# Patient Record
Sex: FEMALE | Race: WHITE | Employment: OTHER | URBAN - METROPOLITAN AREA
[De-identification: names, ages, dates, MRNs, and addresses within clinical notes are randomized per-mention and may not be internally consistent; named-entity substitution may affect disease eponyms.]

---

## 2017-02-07 RX ORDER — LOSARTAN POTASSIUM AND HYDROCHLOROTHIAZIDE 25; 100 MG/1; MG/1
TABLET ORAL
Qty: 90 TAB | Refills: 0 | Status: SHIPPED | OUTPATIENT
Start: 2017-02-07 | End: 2017-05-08 | Stop reason: SDUPTHER

## 2017-02-10 RX ORDER — METOPROLOL SUCCINATE 25 MG/1
TABLET, EXTENDED RELEASE ORAL
Qty: 90 TAB | Refills: 0 | Status: SHIPPED | OUTPATIENT
Start: 2017-02-10 | End: 2017-05-08 | Stop reason: SDUPTHER

## 2017-03-27 ENCOUNTER — HOSPITAL ENCOUNTER (OUTPATIENT)
Dept: LAB | Age: 77
Discharge: HOME | End: 2017-03-27
Payer: MEDICARE

## 2017-03-27 ENCOUNTER — OFFICE VISIT (OUTPATIENT)
Dept: FAMILY MEDICINE CLINIC | Age: 77
End: 2017-03-27

## 2017-03-27 VITALS
DIASTOLIC BLOOD PRESSURE: 88 MMHG | HEIGHT: 62 IN | WEIGHT: 141 LBS | BODY MASS INDEX: 25.95 KG/M2 | OXYGEN SATURATION: 97 % | SYSTOLIC BLOOD PRESSURE: 138 MMHG | RESPIRATION RATE: 18 BRPM | HEART RATE: 82 BPM | TEMPERATURE: 97.8 F

## 2017-03-27 DIAGNOSIS — E03.9 ACQUIRED HYPOTHYROIDISM: ICD-10-CM

## 2017-03-27 DIAGNOSIS — M51.36 DDD (DEGENERATIVE DISC DISEASE), LUMBAR: ICD-10-CM

## 2017-03-27 DIAGNOSIS — I10 ESSENTIAL HYPERTENSION: ICD-10-CM

## 2017-03-27 DIAGNOSIS — Z00.00 ROUTINE GENERAL MEDICAL EXAMINATION AT A HEALTH CARE FACILITY: Primary | ICD-10-CM

## 2017-03-27 DIAGNOSIS — Z71.89 ADVANCED CARE PLANNING/COUNSELING DISCUSSION: ICD-10-CM

## 2017-03-27 PROCEDURE — 80061 LIPID PANEL: CPT

## 2017-03-27 PROCEDURE — 85025 COMPLETE CBC W/AUTO DIFF WBC: CPT

## 2017-03-27 PROCEDURE — 80053 COMPREHEN METABOLIC PANEL: CPT

## 2017-03-27 PROCEDURE — 84443 ASSAY THYROID STIM HORMONE: CPT

## 2017-03-27 RX ORDER — LEVOTHYROXINE SODIUM 125 UG/1
125 TABLET ORAL
Qty: 90 TAB | Refills: 1 | Status: SHIPPED | OUTPATIENT
Start: 2017-03-27 | End: 2017-04-03 | Stop reason: SDUPTHER

## 2017-03-27 RX ORDER — TRAMADOL HYDROCHLORIDE 50 MG/1
TABLET ORAL
Qty: 60 TAB | Refills: 5 | Status: SHIPPED | OUTPATIENT
Start: 2017-03-27 | End: 2018-07-03 | Stop reason: ALTCHOICE

## 2017-03-27 NOTE — PROGRESS NOTES
Patient here for 6 month f/u, fasting labs, med refill. 1. Have you been to the ER, urgent care clinic since your last visit? Hospitalized since your last visit? No    2. Have you seen or consulted any other health care providers outside of the [address] [place] [address] since your last visit? Include any pap smears or colon screening. No       Due for med cpx    Chief Complaint   Patient presents with    Labs     fasting      she is a 77y.o. year old female who presents for evaluation for their Medicare Wellness Visit. Loretha Ramal is completed and assessed=yes  Depression Screen is completed and assessed=yes  Medication list reviewed and adjusted for accuracy=yes  Immunizations reviewed and updated=yes  Health/Preventative Screenings reviewed and updated=yes  ADL Functions reviewed=yes    Patient Active Problem List    Diagnosis    Advanced care planning/counseling discussion     Has a LW,asked to bring in     [address] care planning     Pt has LW      Asthma    Bronchitis    Bronchospasm with bronchitis, acute    DDD (degenerative disc disease), lumbar    Tachycardia    HTN (hypertension)    Hypothyroid    OA (osteoarthritis)    GERD (gastroesophageal reflux disease)    Osteoporosis    Left knee DJD       Reviewed PmHx, RxHx, FmHx, SocHx, AllgHx and updated and dated in the chart.     Review of Systems - negative except as listed above in the HPI    Objective:     Vitals:    03/27/17 0849   BP: 138/88   Pulse: 82   Resp: 18   Temp: 97.8 °F (36.6 °C)   SpO2: 97%   Weight: 141 lb (64 kg)   Height: 5' 2\" (1.575 m)     Physical Examination: General appearance - alert, well appearing, and in no distress  Eyes - pupils equal and reactive, extraocular eye movements intact  Ears - bilateral TM's and external ear canals normal  Nose - normal and patent, no erythema, discharge or polyps  Mouth - mucous membranes moist, pharynx normal without lesions  Neck - supple, no significant adenopathy  Chest - clear to auscultation, no wheezes, rales or rhonchi, symmetric air entry  Heart - normal rate, regular rhythm, normal S1, S2, no murmurs, rubs, clicks or gallops  Abdomen - soft, nontender, nondistended, no masses or organomegaly  Extremities - peripheral pulses normal, no pedal edema, no clubbing or cyanosis    Assessment/ Plan:   Rose Montoya was seen today for labs. Diagnoses and all orders for this visit:    Routine general medical examination at a health care facility  -see below    Essential hypertension  -     LIPID PANEL  -     METABOLIC PANEL, COMPREHENSIVE  -     CBC WITH AUTOMATED DIFF  -     TSH 3RD GENERATION  -at goal    Acquired hypothyroidism  -     levothyroxine (SYNTHROID) 125 mcg tablet; Take 1 Tab by mouth Daily (before breakfast).  TAKE ONE TABLET (137MCG) BY MOUTH ONCE DAILY BEFORE BREAKFAST  -     TSH 3RD GENERATION  -billy dec dose last OV    Advanced care planning/counseling discussion  -has LW--asked pt to bring it in    DDD (degenerative disc disease), lumbar  -     traMADol (ULTRAM) 50 mg tablet; TAKE ONE TABLET BY MOUTH EVERY 8 HOURS AS NEEDED FOR PAIN  -using off and on         -Pain evaluation performed in office  -Cognitive Screen performed in office  -Depression Screen, Fall risks (by up and go test)  and ADL functionality were addressed  -Medication list updated and reviewed for any changes   -A comprehensive review of medical issues and a plan was formulated  -End of life planning was addressed with pt   -Health Screenings for preventions were addressed and a plan was formulated  -Shingles Vaccine was recommended  -Discussed with patient cancer risk factors and appropriate screenings for age  -Patient evaluated for colonoscopy and referred if needed per screeing criteria  -Labs from previous visits were discussed with patient   -Discussed with patient diet and exercise and formulated a plan as needed  -An Advanced care plan was developed with the patient.  -Alcohol screening performed and was negative    -Follow-up Disposition:  Return in about 6 months (around 9/27/2017). I have discussed the diagnosis with the patient and the intended plan as seen in the above orders. The patient understands and agrees with the plan. The patient has received an after-visit summary and questions were answered concerning future plans. Medication Side Effects and Warnings were discussed with patien  Patient Labs were reviewed and or requested  Patient Past Records were reviewed and or requested    There are no Patient Instructions on file for this visit.       Maria D Coronado M.D.

## 2017-03-27 NOTE — MR AVS SNAPSHOT
Visit Information Date & Time Provider Department Dept. Phone Encounter #  
 3/27/2017  8:30 AM Lucas Schmitz MD [address] [facility] [phone] [id number] Follow-up Instructions Return in about 6 months (around 9/27/2017). Upcoming Health Maintenance Date Due DTaP/Tdap/Td series (1 - Tdap) 10/31/1962 MEDICARE YEARLY EXAM 3/16/2017 GLAUCOMA SCREENING Q2Y 3/27/2019 COLONOSCOPY 3/15/2026 Allergies as of 3/27/2017  Review Complete On: 3/27/2017 By: Lucas Schmitz MD  
  
 Severity Noted Reaction Type Reactions Ace Inhibitors  05/10/2010    Hives, Swelling Ambien [Zolpidem]  05/10/2010    Other (comments) HALLUCINATIONS Keflex [Cephalexin]  05/10/2010    Other (comments) Pt reports having hives from head to toe so her PCP included several drugs that she was taking at the time as the cause (Keflex, Levaquin and ACE inhibitors) Levaquin [Levofloxacin]  05/10/2010    Not Reported This Time Current Immunizations  Reviewed on 5/9/2016 Name Date Influenza Vaccine 9/20/2014 Influenza Vaccine Whole 10/10/2011 Pneumococcal Conjugate (PCV-13) 8/24/2015 Pneumococcal Vaccine (Unspecified Type) 8/21/2010 Zoster Vaccine, Live 12/21/2013 Not reviewed this visit You Were Diagnosed With   
  
 Codes Comments Routine general medical examination at a health care facility    -  Primary ICD-10-CM: Z00.00 ICD-9-CM: V70.0 Essential hypertension     ICD-10-CM: I10 
ICD-9-CM: 401.9 Acquired hypothyroidism     ICD-10-CM: E03.9 ICD-9-CM: 244.9 Advanced care planning/counseling discussion     ICD-10-CM: Z71.89 ICD-9-CM: V65.49   
 DDD (degenerative disc disease), lumbar     ICD-10-CM: M51.36 
ICD-9-CM: 722.52 Vitals BP Pulse Temp Resp Height(growth percentile) Weight(growth percentile) 138/88 82 97.8 °F (36.6 °C) 18 5' 2\" (1.575 m) 141 lb (64 kg) SpO2 BMI OB Status Smoking Status 97% 25.79 kg/m2 Hysterectomy Never Smoker Vitals History BMI and BSA Data Body Mass Index Body Surface Area 25.79 kg/m 2 1.67 m 2 Preferred Pharmacy Pharmacy Name [facility] [address] Your Updated Medication List  
  
   
This list is accurate as of: 3/27/17  9:17 AM.  Always use your most recent med list.  
  
  
  
  
 Cholecalciferol (Vitamin D3) 2,000 unit Cap capsule Commonly known as:  VITAMIN D3 Take 4,000 Units by mouth.  
  
 levothyroxine 125 mcg tablet Commonly known as:  SYNTHROID Take 1 Tab by mouth Daily (before breakfast). TAKE ONE TABLET (137MCG) BY MOUTH ONCE DAILY BEFORE BREAKFAST  
  
 losartan-hydroCHLOROthiazide 100-25 mg per tablet Commonly known as:  HYZAAR  
TAKE ONE TABLET BY MOUTH ONCE DAILY  
  
 metoprolol succinate 25 mg XL tablet Commonly known as:  TOPROL-XL  
TAKE ONE TABLET BY MOUTH ONCE DAILY MULTIVITAMIN PO Take 1 Tab by mouth daily. OMEGA 3 FISH OIL PO Take 1 Cap by mouth daily. traMADol 50 mg tablet Commonly known as:  ULTRAM  
TAKE ONE TABLET BY MOUTH EVERY 8 HOURS AS NEEDED FOR PAIN Prescriptions Printed Refills  
 traMADol (ULTRAM) 50 mg tablet 5 Sig: TAKE ONE TABLET BY MOUTH EVERY 8 HOURS AS NEEDED FOR PAIN Class: Print Prescriptions Sent to Pharmacy Refills  
 levothyroxine (SYNTHROID) 125 mcg tablet 1 Sig: Take 1 Tab by mouth Daily (before breakfast). TAKE ONE TABLET (137MCG) BY MOUTH ONCE DAILY BEFORE BREAKFAST Class: Normal  
 Pharmacy: [address] [place] [address] Ph #: [phone] Route: Oral  
  
We Performed the Following CBC WITH AUTOMATED DIFF [57048 CPT(R)] LIPID PANEL [19905 CPT(R)] METABOLIC PANEL, COMPREHENSIVE [37879 CPT(R)] TSH 3RD GENERATION [10925 CPT(R)] Follow-up Instructions Return in about 6 months (around 9/27/2017). Introducing [facility]! Dear Suni Turner: Thank you for requesting a Cohuman account. Our records indicate that you already have an active Cohuman account. You can access your account anytime at [email or web address] Did you know that you can access your hospital and ER discharge instructions at any time in Cohuman? You can also review all of your test results from your hospital stay or ER visit. Additional Information If you have questions, please visit the Frequently Asked Questions section of the Cohuman website at [email or web address] Remember, Cohuman is NOT to be used for urgent needs. For medical emergencies, dial 911. Now available from your iPhone and Android! Please provide this summary of care documentation to your next provider. Your primary care clinician is listed as IGTA ISLAS. If you have any questions after today's visit, please call [phone].

## 2017-03-28 LAB
ALBUMIN SERPL-MCNC: 4.1 G/DL (ref 3.5–4.8)
ALBUMIN/GLOB SERPL: 1.6 {RATIO} (ref 1.2–2.2)
ALP SERPL-CCNC: 84 IU/L (ref 39–117)
ALT SERPL-CCNC: 14 IU/L (ref 0–32)
AST SERPL-CCNC: 25 IU/L (ref 0–40)
BASOPHILS # BLD AUTO: 0.1 X10E3/UL (ref 0–0.2)
BASOPHILS NFR BLD AUTO: 1 %
BILIRUB SERPL-MCNC: 0.9 MG/DL (ref 0–1.2)
BUN SERPL-MCNC: 15 MG/DL (ref 8–27)
BUN/CREAT SERPL: 21 (ref 11–26)
CALCIUM SERPL-MCNC: 9.4 MG/DL (ref 8.7–10.3)
CHLORIDE SERPL-SCNC: 101 MMOL/L (ref 96–106)
CHOLEST SERPL-MCNC: 169 MG/DL (ref 100–199)
CO2 SERPL-SCNC: 27 MMOL/L (ref 18–29)
CREAT SERPL-MCNC: 0.72 MG/DL (ref 0.57–1)
EOSINOPHIL # BLD AUTO: 0.1 X10E3/UL (ref 0–0.4)
EOSINOPHIL NFR BLD AUTO: 2 %
ERYTHROCYTE [DISTWIDTH] IN BLOOD BY AUTOMATED COUNT: 13.4 % (ref 12.3–15.4)
GLOBULIN SER CALC-MCNC: 2.5 G/DL (ref 1.5–4.5)
GLUCOSE SERPL-MCNC: 91 MG/DL (ref 65–99)
HCT VFR BLD AUTO: 43.6 % (ref 34–46.6)
HDLC SERPL-MCNC: 75 MG/DL
HGB BLD-MCNC: 14.5 G/DL (ref 11.1–15.9)
IMM GRANULOCYTES # BLD: 0 X10E3/UL (ref 0–0.1)
IMM GRANULOCYTES NFR BLD: 0 %
INTERPRETATION, 910389: NORMAL
LDLC SERPL CALC-MCNC: 78 MG/DL (ref 0–99)
LYMPHOCYTES # BLD AUTO: 1.3 X10E3/UL (ref 0.7–3.1)
LYMPHOCYTES NFR BLD AUTO: 28 %
MCH RBC QN AUTO: 30.5 PG (ref 26.6–33)
MCHC RBC AUTO-ENTMCNC: 33.3 G/DL (ref 31.5–35.7)
MCV RBC AUTO: 92 FL (ref 79–97)
MONOCYTES # BLD AUTO: 0.3 X10E3/UL (ref 0.1–0.9)
MONOCYTES NFR BLD AUTO: 6 %
NEUTROPHILS # BLD AUTO: 2.9 X10E3/UL (ref 1.4–7)
NEUTROPHILS NFR BLD AUTO: 63 %
PLATELET # BLD AUTO: 242 X10E3/UL (ref 150–379)
POTASSIUM SERPL-SCNC: 4.2 MMOL/L (ref 3.5–5.2)
PROT SERPL-MCNC: 6.6 G/DL (ref 6–8.5)
RBC # BLD AUTO: 4.76 X10E6/UL (ref 3.77–5.28)
SODIUM SERPL-SCNC: 141 MMOL/L (ref 134–144)
TRIGL SERPL-MCNC: 81 MG/DL (ref 0–149)
TSH SERPL DL<=0.005 MIU/L-ACNC: 3.42 UIU/ML (ref 0.45–4.5)
VLDLC SERPL CALC-MCNC: 16 MG/DL (ref 5–40)
WBC # BLD AUTO: 4.6 X10E3/UL (ref 3.4–10.8)

## 2017-04-03 ENCOUNTER — TELEPHONE (OUTPATIENT)
Dept: FAMILY MEDICINE CLINIC | Age: 77
End: 2017-04-03

## 2017-04-03 DIAGNOSIS — E03.9 ACQUIRED HYPOTHYROIDISM: ICD-10-CM

## 2017-04-03 RX ORDER — LEVOTHYROXINE SODIUM 137 UG/1
137 TABLET ORAL
Qty: 90 TAB | Refills: 3 | Status: SHIPPED | OUTPATIENT
Start: 2017-04-03 | End: 2018-06-29 | Stop reason: DRUGHIGH

## 2017-04-03 NOTE — TELEPHONE ENCOUNTER
Incoming fax from [address] Would like to clarify current dosage of Levothyroxine. Dosage states 125mcg, however directions state take 137mcg daily. Phone [phone].

## 2017-05-08 RX ORDER — METOPROLOL SUCCINATE 25 MG/1
TABLET, EXTENDED RELEASE ORAL
Qty: 90 TAB | Refills: 0 | Status: SHIPPED | OUTPATIENT
Start: 2017-05-08 | End: 2017-08-06 | Stop reason: SDUPTHER

## 2017-05-08 RX ORDER — LOSARTAN POTASSIUM AND HYDROCHLOROTHIAZIDE 25; 100 MG/1; MG/1
TABLET ORAL
Qty: 90 TAB | Refills: 0 | Status: SHIPPED | OUTPATIENT
Start: 2017-05-08 | End: 2017-08-06 | Stop reason: SDUPTHER

## 2017-05-22 ENCOUNTER — OFFICE VISIT (OUTPATIENT)
Dept: FAMILY MEDICINE CLINIC | Age: 77
End: 2017-05-22

## 2017-05-22 VITALS
HEART RATE: 110 BPM | TEMPERATURE: 99 F | HEIGHT: 62 IN | SYSTOLIC BLOOD PRESSURE: 156 MMHG | DIASTOLIC BLOOD PRESSURE: 96 MMHG | WEIGHT: 139 LBS | OXYGEN SATURATION: 96 % | BODY MASS INDEX: 25.58 KG/M2 | RESPIRATION RATE: 18 BRPM

## 2017-05-22 DIAGNOSIS — M25.511 ACUTE PAIN OF RIGHT SHOULDER: ICD-10-CM

## 2017-05-22 DIAGNOSIS — J06.9 UPPER RESPIRATORY TRACT INFECTION, UNSPECIFIED TYPE: Primary | ICD-10-CM

## 2017-05-22 DIAGNOSIS — I10 ESSENTIAL HYPERTENSION: ICD-10-CM

## 2017-05-22 RX ORDER — DICLOFENAC SODIUM 75 MG/1
75 TABLET, DELAYED RELEASE ORAL 2 TIMES DAILY
Qty: 60 TAB | Refills: 2 | Status: SHIPPED | OUTPATIENT
Start: 2017-05-22 | End: 2017-06-05

## 2017-05-22 RX ORDER — AZITHROMYCIN 250 MG/1
TABLET, FILM COATED ORAL
Qty: 6 TAB | Refills: 0 | Status: SHIPPED | OUTPATIENT
Start: 2017-05-22 | End: 2017-10-31

## 2017-05-22 RX ORDER — LORATADINE 10 MG/1
10 TABLET ORAL DAILY
Qty: 30 TAB | Refills: 11 | Status: SHIPPED | OUTPATIENT
Start: 2017-05-22 | End: 2018-05-17

## 2017-05-22 NOTE — MR AVS SNAPSHOT
Visit Information Date & Time Provider Department Dept. Phone Encounter #  
 5/22/2017  3:15 PM Toña Reyes MD [address] [phone] [id number] Follow-up Instructions Return if symptoms worsen or fail to improve. Your Appointments 9/27/2017 10:50 AM  
ESTABLISHED PATIENT with Toña Reyes MD  
[facility] Lori Nogueira) Appt Note: 6mo fuv  
 [address] [address]  
[phone]  
  
   
 [address] [address] Upcoming Health Maintenance Date Due DTaP/Tdap/Td series (1 - Tdap) 10[birth year]31/1961 INFLUENZA AGE 9 TO ADULT 8/1/2017 MEDICARE YEARLY EXAM 3/28/2018 GLAUCOMA SCREENING Q2Y 3/27/2019 COLONOSCOPY 3/15/2026 Allergies as of 5/22/2017  Review Complete On: 5/22/2017 By: Toña Reyes MD  
  
 Severity Noted Reaction Type Reactions Ace Inhibitors  05/10/2010    Hives, Swelling Ambien [Zolpidem]  05/10/2010    Other (comments) HALLUCINATIONS Keflex [Cephalexin]  05/10/2010    Other (comments) Pt reports having hives from head to toe so her PCP included several drugs that she was taking at the time as the cause (Keflex, Levaquin and ACE inhibitors) Levaquin [Levofloxacin]  05/10/2010    Not Reported This Time Current Immunizations  Reviewed on 5/9/2016 Name Date Influenza Vaccine 9/20/2014 Influenza Vaccine Whole 10/10/2011 Pneumococcal Conjugate (PCV-13) 8/24/2015 Pneumococcal Vaccine (Unspecified Type) 8/21/2010 Zoster Vaccine, Live 12/21/2013 Not reviewed this visit You Were Diagnosed With   
  
 Codes Comments Upper respiratory tract infection, unspecified type    -  Primary ICD-10-CM: J06.9 ICD-9-CM: 465.9 Acute pain of right shoulder     ICD-10-CM: M25.511 ICD-9-CM: 719.41 Essential hypertension     ICD-10-CM: I10 
ICD-9-CM: 401.9 Vitals BP Pulse Temp Resp Height(growth percentile) Weight(growth percentile) (!) 156/96 (!) 110 99 °F (37.2 °C) 18 5' 2\" (1.575 m) 139 lb (63 kg) SpO2 BMI OB Status Smoking Status 96% 25.42 kg/m2 Hysterectomy Never Smoker Vitals History BMI and BSA Data Body Mass Index Body Surface Area  
 25.42 kg/m 2 1.66 m 2 Preferred Pharmacy Pharmacy Name [facility] [address], [address] Your Updated Medication List  
  
   
This list is accurate as of: 5/22/17  4:26 PM.  Always use your most recent med list.  
  
  
  
  
 azithromycin 250 mg tablet Commonly known as:  Ratna Canes Take two tablets today then one tablet daily Cholecalciferol (Vitamin D3) 2,000 unit Cap capsule Commonly known as:  VITAMIN D3 Take 4,000 Units by mouth. diclofenac EC 75 mg EC tablet Commonly known as:  VOLTAREN Take 1 Tab by mouth two (2) times a day for 14 days. levothyroxine 137 mcg tablet Commonly known as:  SYNTHROID Take 137 mcg by mouth Daily (before breakfast). TAKE ONE TABLET (137MCG) BY MOUTH ONCE DAILY BEFORE BREAKFAST  
  
 loratadine 10 mg tablet Commonly known as:  Otilio Emir Take 1 Tab by mouth daily for 360 days. losartan-hydroCHLOROthiazide 100-25 mg per tablet Commonly known as:  HYZAAR  
TAKE ONE TABLET BY MOUTH ONCE DAILY  
  
 metoprolol succinate 25 mg XL tablet Commonly known as:  TOPROL-XL  
TAKE ONE TABLET BY MOUTH ONCE DAILY MULTIVITAMIN PO Take 1 Tab by mouth daily. OMEGA 3 FISH OIL PO Take 1 Cap by mouth daily. traMADol 50 mg tablet Commonly known as:  ULTRAM  
TAKE ONE TABLET BY MOUTH EVERY 8 HOURS AS NEEDED FOR PAIN Prescriptions Sent to Pharmacy Refills  
 diclofenac EC (VOLTAREN) 75 mg EC tablet 2 Sig: Take 1 Tab by mouth two (2) times a day for 14 days.   
 Class: Normal  
 Pharmacy: [address] [place] [address] Ph #: [phone] Route: Oral  
 azithromycin (ZITHROMAX) 250 mg tablet 0 Sig: Take two tablets today then one tablet daily Class: Normal  
 Pharmacy: [address] [place] [address] Ph #: [phone]  
 loratadine (CLARITIN) 10 mg tablet 11 Sig: Take 1 Tab by mouth daily for 360 days. Class: Normal  
 Pharmacy: [address] [place] [address] Ph #: [phone] Route: Oral  
  
Follow-up Instructions Return if symptoms worsen or fail to improve. Introducing [facility]! Dear Rosario Dugan: Thank you for requesting a VerticalResponse account. Our records indicate that you already have an active VerticalResponse account. You can access your account anytime at [email or web address] Did you know that you can access your hospital and ER discharge instructions at any time in VerticalResponse? You can also review all of your test results from your hospital stay or ER visit. Additional Information If you have questions, please visit the Frequently Asked Questions section of the VerticalResponse website at [email or web address] Remember, VerticalResponse is NOT to be used for urgent needs. For medical emergencies, dial 911. Now available from your iPhone and Android! Please provide this summary of care documentation to your next provider. Your primary care clinician is listed as GITA ISLAS. If you have any questions after today's visit, please call [phone].

## 2017-05-22 NOTE — PROGRESS NOTES
Patient here for cold sx, cough, sinus pressure x 3 days. Right shoulder pain x 2 weeks. 1. Have you been to the ER, urgent care clinic since your last visit? Hospitalized since your last visit? No    2. Have you seen or consulted any other health care providers outside of the [address] [place] [address] since your last visit? Include any pap smears or colon screening. No         Chief Complaint   Patient presents with    Cough     cough, sinus pressure, cold sx x  3days    Shoulder Pain     right shoulder pain x 2 week     she is a 77y.o. year old female who presents for evalution. Reviewed PmHx, RxHx, FmHx, SocHx, AllgHx and updated and dated in the chart. Patient Active Problem List    Diagnosis    Advanced care planning/counseling discussion     Has a LW,asked to bring in     [address] care planning     Pt has LW      Asthma    Bronchitis    Bronchospasm with bronchitis, acute    DDD (degenerative disc disease), lumbar    Tachycardia    HTN (hypertension)    Hypothyroid    OA (osteoarthritis)    GERD (gastroesophageal reflux disease)    Osteoporosis    Left knee DJD       Review of Systems - negative except as listed above in the HPI    Objective:     Vitals:    05/22/17 1614   BP: (!) 156/96   Pulse: (!) 110   Resp: 18   Temp: 99 °F (37.2 °C)   SpO2: 96%   Weight: 139 lb (63 kg)   Height: 5' 2\" (1.575 m)     Physical Examination: General appearance - alert, well appearing, and in no distress  Nose - normal and patent, no erythema, discharge or polyps  Neck - supple, no significant adenopathy  Chest - clear to auscultation, no wheezes, rales or rhonchi, symmetric air entry  Heart - normal rate, regular rhythm, normal S1, S2, no murmurs, rubs, clicks or gallops      Assessment/ Plan:   Kati You was seen today for cough and shoulder pain. Diagnoses and all orders for this visit:    Upper respiratory tract infection, unspecified type  -     azithromycin (ZITHROMAX) 250 mg tablet;  Take two tablets today then one tablet daily  -     loratadine (CLARITIN) 10 mg tablet; Take 1 Tab by mouth daily for 360 days.  -add rx    Acute pain of right shoulder  -     diclofenac EC (VOLTAREN) 75 mg EC tablet; Take 1 Tab by mouth two (2) times a day for 14 days. -neg shoulder exam  -add rx    Essential hypertension  -inc with pain       Follow-up Disposition:  Return if symptoms worsen or fail to improve. I have discussed the diagnosis with the patient and the intended plan as seen in the above orders. The patient understands and agrees with the plan. The patient has received an after-visit summary and questions were answered concerning future plans. Medication Side Effects and Warnings were discussed with patient  Patient Labs were reviewed and or requested:  Patient Past Records were reviewed and or requested    Radha Cerda M.D. There are no Patient Instructions on file for this visit.

## 2017-08-07 RX ORDER — METOPROLOL SUCCINATE 25 MG/1
TABLET, EXTENDED RELEASE ORAL
Qty: 90 TAB | Refills: 0 | Status: SHIPPED | OUTPATIENT
Start: 2017-08-07 | End: 2018-05-08 | Stop reason: SDUPTHER

## 2017-08-07 RX ORDER — LOSARTAN POTASSIUM AND HYDROCHLOROTHIAZIDE 25; 100 MG/1; MG/1
TABLET ORAL
Qty: 90 TAB | Refills: 0 | Status: SHIPPED | OUTPATIENT
Start: 2017-08-07 | End: 2017-10-31

## 2017-08-09 RX ORDER — METOPROLOL SUCCINATE 25 MG/1
TABLET, EXTENDED RELEASE ORAL
Qty: 90 TAB | Refills: 0 | Status: SHIPPED | OUTPATIENT
Start: 2017-08-09 | End: 2018-01-31 | Stop reason: SDUPTHER

## 2017-08-09 RX ORDER — LOSARTAN POTASSIUM AND HYDROCHLOROTHIAZIDE 25; 100 MG/1; MG/1
TABLET ORAL
Qty: 90 TAB | Refills: 0 | Status: SHIPPED | OUTPATIENT
Start: 2017-08-09 | End: 2018-01-31 | Stop reason: SDUPTHER

## 2017-09-19 ENCOUNTER — DOCUMENTATION ONLY (OUTPATIENT)
Dept: FAMILY MEDICINE CLINIC | Age: 77
End: 2017-09-19

## 2017-09-19 NOTE — PROGRESS NOTES
Spoke with pt- states having rotator cuff surgery w Dr. Danny Galloway @ [phone] and needs for us to just verify her meds, LM on Dr. Jim Rosas nurse line to call back to clarify what's needed.

## 2017-09-19 NOTE — PROGRESS NOTES
Pt having rotator cuff surgery on 9/28 @ [facility]- needs a note of medical clearance - spoke with Dr. Malachi Love office they just need to know ok from you for her to have surgery, sh'e having a pre-op done at [facility] 1 week prior to her surgery. # [phone] FAX # for letter [phone].

## 2017-10-31 ENCOUNTER — HOSPITAL ENCOUNTER (OUTPATIENT)
Dept: LAB | Age: 77
Discharge: HOME | End: 2017-10-31
Payer: MEDICARE

## 2017-10-31 ENCOUNTER — OFFICE VISIT (OUTPATIENT)
Dept: FAMILY MEDICINE CLINIC | Age: 77
End: 2017-10-31

## 2017-10-31 VITALS
WEIGHT: 139 LBS | OXYGEN SATURATION: 98 % | HEIGHT: 63 IN | RESPIRATION RATE: 18 BRPM | DIASTOLIC BLOOD PRESSURE: 72 MMHG | HEART RATE: 83 BPM | TEMPERATURE: 97.5 F | BODY MASS INDEX: 24.63 KG/M2 | SYSTOLIC BLOOD PRESSURE: 179 MMHG

## 2017-10-31 DIAGNOSIS — R41.3 MEMORY LOSS: Primary | ICD-10-CM

## 2017-10-31 DIAGNOSIS — Z96.611 HISTORY OF RIGHT SHOULDER REPLACEMENT: ICD-10-CM

## 2017-10-31 DIAGNOSIS — E03.9 ACQUIRED HYPOTHYROIDISM: ICD-10-CM

## 2017-10-31 DIAGNOSIS — I10 ESSENTIAL HYPERTENSION: ICD-10-CM

## 2017-10-31 PROCEDURE — 80061 LIPID PANEL: CPT

## 2017-10-31 PROCEDURE — 85025 COMPLETE CBC W/AUTO DIFF WBC: CPT

## 2017-10-31 PROCEDURE — 84443 ASSAY THYROID STIM HORMONE: CPT

## 2017-10-31 PROCEDURE — 80053 COMPREHEN METABOLIC PANEL: CPT

## 2017-10-31 NOTE — PROGRESS NOTES
Chief Complaint   Patient presents with   [address]     Pt presents to the office f/u, lab    1. Have you been to the ER, urgent care clinic since your last visit? Hospitalized since your last visit? No    2. Have you seen or consulted any other health care providers outside of the [address] [place] [address] since your last visit? Include any pap smears or colon screening. No        Chief Complaint   Patient presents with   [address] Only     She is a 69 y.o. female who presents for evalution. Reviewed PmHx, RxHx, FmHx, SocHx, AllgHx and updated and dated in the chart.     Patient Active Problem List    Diagnosis    History of right shoulder replacement    Advanced care planning/counseling discussion     Has a LW,asked to bring in     [address] care planning     Pt has LW      Asthma    Bronchitis    Bronchospasm with bronchitis, acute    DDD (degenerative disc disease), lumbar    Tachycardia    HTN (hypertension)    Hypothyroid    OA (osteoarthritis)    GERD (gastroesophageal reflux disease)    Osteoporosis    Left knee DJD       Review of Systems - negative except as listed above in the HPI    Objective:     Vitals:    10/31/17 1125   BP: 179/72   Pulse: 83   Resp: 18   Temp: 97.5 °F (36.4 °C)   TempSrc: Oral   SpO2: 98%   Weight: 139 lb (63 kg)   Height: 5' 3\" (1.6 m)     Physical Examination: General appearance - alert, well appearing, and in no distress  Eyes - pupils equal and reactive, extraocular eye movements intact  Ears - bilateral TM's and external ear canals normal  Nose - normal and patent, no erythema, discharge or polyps  Mouth - mucous membranes moist, pharynx normal without lesions  Neck - supple, no significant adenopathy  Chest - clear to auscultation, no wheezes, rales or rhonchi, symmetric air entry  Heart - normal rate, regular rhythm, normal S1, S2, no murmurs, rubs, clicks or gallops  Neurological - alert, oriented, normal speech, no focal findings or movement disorder noted    Assessment/ Plan:   Diagnoses and all orders for this visit:    1. Memory loss  -     REFERRAL TO NEUROLOGY  -     CBC WITH AUTOMATED DIFF  -mild    2. Essential hypertension  -     LIPID PANEL  -     METABOLIC PANEL, COMPREHENSIVE  -take rx    3. Acquired hypothyroidism  -     TSH 3RD GENERATION    4. History of right shoulder replacement  -undergoing PT       Follow-up Disposition:  Return in about 6 months (around 4/30/2018), or if symptoms worsen or fail to improve. I have discussed the diagnosis with the patient and the intended plan as seen in the above orders. The patient understands and agrees with the plan. The patient has received an after-visit summary and questions were answered concerning future plans. Medication Side Effects and Warnings were discussed with patient  Patient Labs were reviewed and or requested:  Patient Past Records were reviewed and or requested    Fern Chi M.D. There are no Patient Instructions on file for this visit.

## 2017-10-31 NOTE — MR AVS SNAPSHOT
Visit Information Date & Time Provider Department Dept. Phone Encounter #  
 10/31/2017 10:50 AM Joo Truong MD [address] [facility] [phone] [id number] Follow-up Instructions Return in about 6 months (around 4/30/2018), or if symptoms worsen or fail to improve. Upcoming Health Maintenance Date Due DTaP/Tdap/Td series (1 - Tdap) 10[birth year]31/1961 INFLUENZA AGE 9 TO ADULT 8/1/2017 MEDICARE YEARLY EXAM 3/28/2018 GLAUCOMA SCREENING Q2Y 3/27/2019 COLONOSCOPY 3/15/2026 Allergies as of 10/31/2017  Review Complete On: 10/31/2017 By: Joo Truong MD  
  
 Severity Noted Reaction Type Reactions Ace Inhibitors  05/10/2010    Hives, Swelling Ambien [Zolpidem]  05/10/2010    Other (comments) HALLUCINATIONS Keflex [Cephalexin]  05/10/2010    Other (comments) Pt reports having hives from head to toe so her PCP included several drugs that she was taking at the time as the cause (Keflex, Levaquin and ACE inhibitors) Levaquin [Levofloxacin]  05/10/2010    Not Reported This Time Current Immunizations  Reviewed on 10/31/2017 Name Date Influenza High Dose Vaccine PF 10/31/2017 Influenza Vaccine 9/20/2014 Influenza Vaccine Whole 10/10/2011 Pneumococcal Conjugate (PCV-13) 8/24/2015 Pneumococcal Vaccine (Unspecified Type) 8/21/2010 Zoster Vaccine, Live 12/21/2013 Reviewed by Joo Truong MD on 10/31/2017 at 11:41 AM  
You Were Diagnosed With   
  
 Codes Comments Memory loss    -  Primary ICD-10-CM: R41.3 ICD-9-CM: 780.93 Essential hypertension     ICD-10-CM: I10 
ICD-9-CM: 401.9 Acquired hypothyroidism     ICD-10-CM: E03.9 ICD-9-CM: 244.9 History of right shoulder replacement     ICD-10-CM: R42.874 ICD-9-CM: V43.61 Vitals BP Pulse Temp Resp Height(growth percentile) Weight(growth percentile) 179/72 83 97.5 °F (36.4 °C) (Oral) 18 5' 3\" (1.6 m) 139 lb (63 kg) SpO2 BMI OB Status Smoking Status 98% 24.62 kg/m2 Hysterectomy Never Smoker BMI and BSA Data Body Mass Index Body Surface Area  
 24.62 kg/m 2 1.67 m 2 Preferred Pharmacy Pharmacy Name [facility] [address], [address] [facility] Your Updated Medication List  
  
   
This list is accurate as of: 10/31/17 11:43 AM.  Always use your most recent med list.  
  
  
  
  
 Cholecalciferol (Vitamin D3) 2,000 unit Cap capsule Commonly known as:  VITAMIN D3 Take 4,000 Units by mouth.  
  
 levothyroxine 137 mcg tablet Commonly known as:  SYNTHROID Take 137 mcg by mouth Daily (before breakfast). TAKE ONE TABLET (137MCG) BY MOUTH ONCE DAILY BEFORE BREAKFAST  
  
 loratadine 10 mg tablet Commonly known as:  Shellye Drape Take 1 Tab by mouth daily for 360 days. losartan-hydroCHLOROthiazide 100-25 mg per tablet Commonly known as:  HYZAAR  
TAKE ONE TABLET BY MOUTH ONCE DAILY * metoprolol succinate 25 mg XL tablet Commonly known as:  TOPROL-XL  
TAKE ONE TABLET BY MOUTH ONCE DAILY * metoprolol succinate 25 mg XL tablet Commonly known as:  TOPROL-XL  
TAKE ONE TABLET BY MOUTH ONCE DAILY MULTIVITAMIN PO Take 1 Tab by mouth daily. OMEGA 3 FISH OIL PO Take 1 Cap by mouth daily. traMADol 50 mg tablet Commonly known as:  ULTRAM  
TAKE ONE TABLET BY MOUTH EVERY 8 HOURS AS NEEDED FOR PAIN  
  
 * Notice: This list has 2 medication(s) that are the same as other medications prescribed for you. Read the directions carefully, and ask your doctor or other care provider to review them with you. We Performed the Following CBC WITH AUTOMATED DIFF [91658 CPT(R)] LIPID PANEL [01169 CPT(R)] METABOLIC PANEL, COMPREHENSIVE [26537 CPT(R)] REFERRAL TO NEUROLOGY [YOO48 Custom] TSH 3RD GENERATION [84998 CPT(R)] Follow-up Instructions Return in about 6 months (around 4/30/2018), or if symptoms worsen or fail to improve. Referral Information Referral ID Referred By Referred To  
  
 [id number] JANEL, [address] Nabila Gan [place] [address] [facility] [address] [facility], [address] [facility] Phone: [phone] Fax: [phone] Visits Status Start Date End Date 1 New Request 10/31/17 10/31/18 If your referral has a status of pending review or denied, additional information will be sent to support the outcome of this decision. Introducing [facility]! Dear Rodney Goel: Thank you for requesting a [facility] account. Our records indicate that you already have an active [facility] account. You can access your account anytime at [email or web address] Did you know that you can access your hospital and ER discharge instructions at any time in [facility]? You can also review all of your test results from your hospital stay or ER visit. Additional Information If you have questions, please visit the Frequently Asked Questions section of the [facility] website at [email or web address] Remember, [facility] is NOT to be used for urgent needs. For medical emergencies, dial 911. Now available from your iPhone and Android! Please provide this summary of care documentation to your next provider. Your primary care clinician is listed as GITA ISLAS. If you have any questions after today's visit, please call [phone].

## 2017-11-01 LAB
ALBUMIN SERPL-MCNC: 4.5 G/DL (ref 3.5–4.8)
ALBUMIN/GLOB SERPL: 1.7 {RATIO} (ref 1.2–2.2)
ALP SERPL-CCNC: 91 IU/L (ref 39–117)
ALT SERPL-CCNC: 17 IU/L (ref 0–32)
AST SERPL-CCNC: 22 IU/L (ref 0–40)
BASOPHILS # BLD AUTO: 0.1 X10E3/UL (ref 0–0.2)
BASOPHILS NFR BLD AUTO: 1 %
BILIRUB SERPL-MCNC: 0.8 MG/DL (ref 0–1.2)
BUN SERPL-MCNC: 12 MG/DL (ref 8–27)
BUN/CREAT SERPL: 16 (ref 12–28)
CALCIUM SERPL-MCNC: 9.8 MG/DL (ref 8.7–10.3)
CHLORIDE SERPL-SCNC: 101 MMOL/L (ref 96–106)
CHOLEST SERPL-MCNC: 215 MG/DL (ref 100–199)
CO2 SERPL-SCNC: 26 MMOL/L (ref 18–29)
CREAT SERPL-MCNC: 0.76 MG/DL (ref 0.57–1)
EOSINOPHIL # BLD AUTO: 0.1 X10E3/UL (ref 0–0.4)
EOSINOPHIL NFR BLD AUTO: 2 %
ERYTHROCYTE [DISTWIDTH] IN BLOOD BY AUTOMATED COUNT: 13.4 % (ref 12.3–15.4)
GFR SERPLBLD CREATININE-BSD FMLA CKD-EPI: 76 ML/MIN/1.73
GFR SERPLBLD CREATININE-BSD FMLA CKD-EPI: 88 ML/MIN/1.73
GLOBULIN SER CALC-MCNC: 2.6 G/DL (ref 1.5–4.5)
GLUCOSE SERPL-MCNC: 97 MG/DL (ref 65–99)
HCT VFR BLD AUTO: 43.4 % (ref 34–46.6)
HDLC SERPL-MCNC: 75 MG/DL
HGB BLD-MCNC: 15.2 G/DL (ref 11.1–15.9)
IMM GRANULOCYTES # BLD: 0 X10E3/UL (ref 0–0.1)
IMM GRANULOCYTES NFR BLD: 0 %
INTERPRETATION, 910389: NORMAL
LDLC SERPL CALC-MCNC: 111 MG/DL (ref 0–99)
LYMPHOCYTES # BLD AUTO: 1.6 X10E3/UL (ref 0.7–3.1)
LYMPHOCYTES NFR BLD AUTO: 27 %
MCH RBC QN AUTO: 30.8 PG (ref 26.6–33)
MCHC RBC AUTO-ENTMCNC: 35 G/DL (ref 31.5–35.7)
MCV RBC AUTO: 88 FL (ref 79–97)
MONOCYTES # BLD AUTO: 0.3 X10E3/UL (ref 0.1–0.9)
MONOCYTES NFR BLD AUTO: 6 %
NEUTROPHILS # BLD AUTO: 3.9 X10E3/UL (ref 1.4–7)
NEUTROPHILS NFR BLD AUTO: 64 %
PLATELET # BLD AUTO: 248 X10E3/UL (ref 150–379)
POTASSIUM SERPL-SCNC: 3.8 MMOL/L (ref 3.5–5.2)
PROT SERPL-MCNC: 7.1 G/DL (ref 6–8.5)
RBC # BLD AUTO: 4.93 X10E6/UL (ref 3.77–5.28)
SODIUM SERPL-SCNC: 142 MMOL/L (ref 134–144)
TRIGL SERPL-MCNC: 146 MG/DL (ref 0–149)
TSH SERPL DL<=0.005 MIU/L-ACNC: 6.17 UIU/ML (ref 0.45–4.5)
VLDLC SERPL CALC-MCNC: 29 MG/DL (ref 5–40)
WBC # BLD AUTO: 6 X10E3/UL (ref 3.4–10.8)

## 2018-01-15 ENCOUNTER — OFFICE VISIT (OUTPATIENT)
Dept: NEUROLOGY | Age: 78
End: 2018-01-15

## 2018-01-15 DIAGNOSIS — R41.3 SHORT-TERM MEMORY LOSS: ICD-10-CM

## 2018-01-15 DIAGNOSIS — F43.22 ADJUSTMENT DISORDER WITH ANXIETY: ICD-10-CM

## 2018-01-15 DIAGNOSIS — G31.84 MILD COGNITIVE IMPAIRMENT: Primary | ICD-10-CM

## 2018-01-15 NOTE — PROGRESS NOTES
[address] [facility],[address]  [address]   [place] [address] [facility] [address] [facility] [address]   [phone] Office   [phone] Fax      Neuropsychology    Initial Diagnostic Interview Note      Referral:  Demarcus Ho MD    Fang Quesada is a 69 y.o. right handed   female who was unaccompanied to the initial clinical interview on 1/15/18. Please refer to her medical records for details pertaining to her history. Briefly, the patient reported that she completed high school without history of repeating a grade or major learning problems and did well in school. She lives with her partner (they've been together 17 years). She is retired from [employer]. Over the past year, she has noticed a progressive decline in short term memory along with word finding difficulties and difficulties recalling names. She has a gradual decline over the past year. She forgets the content of conversations. Has to look at calendar to figure out what day it is and what she has to do that day. This has been getting worse over the past year, and it is gradual, and doesn't happen every day, and she is more and more concerned about it. No known family history of dementia. She has no background stroke, meningitis/encephalitis, SHITAL Fever, Lupus, Lyme, TBI, sz.  SHe takes her own medications and lays them out so she can see them. She does her finances without issue. She has no problems with driving. Partner - he had a stroke 1 1/2 years ago and has some memory issues. Appetite is too good. She does not sleep very well and is awake many times at night. The patient' has no prior history of anxiety, depression or other major psychiatric issues requiring formal treatment. She is, however, anxious about her memory. No counseling or psychiatrist.  No balance issues recently, but had a bad fall about a year ago and broke ribs.   No changes in sense of taste or smell. No other issues to report at this time. No previous neuropsych.        Neuropsychological Mental Status Exam (NMSE):  Historian: Good  Praxis: No UE apraxia  R/L Orientation: Intact to self and to other  Dress: within normal limits   Weight: within normal limits   Appearance/Hygiene: within normal limits   Gait: within normal limits   Assistive Devices: Glasses  Mood: within normal limits   Affect: within normal limits   Comprehension: within normal limits   Thought Process: within normal limits   Expressive Language: within normal limits   Receptive Language: within normal limits   Motor:  No cognitive or motor perseveration  ETOH: Rarely  Tobacco: Denied  Illicit: Denied  SI/HI: Denied  Psychosis: Denied  Insight: Within normal limits  Judgment: Within normal limits  Other Psych:      Past Medical History:   Diagnosis Date    Acute meniscal tear of knee     Bulging disc     DJD (degenerative joint disease)     l knee    GERD (gastroesophageal reflux disease)     HTN     Hypothyroid     OA (osteoarthritis)     Osteoporosis     Spinal stenosis        Past Surgical History:   Procedure Laterality Date    BREAST SURGERY PROCEDURE UNLISTED      1968 implants    COLONOSCOPY       HX HYSTERECTOMY  1979    HX ORTHOPAEDIC      left knee 2008    IR DILATE ESOPH STRICT      2004    NEUROLOGICAL PROCEDURE UNLISTED  2013    STRESS TEST - GXT  2005    neg       Allergies   Allergen Reactions    Ace Inhibitors Hives and Swelling    Ambien [Zolpidem] Other (comments)     HALLUCINATIONS    Keflex [Cephalexin] Other (comments)     Pt reports having hives from head to toe so her PCP included several drugs that she was taking at the time as the cause (Keflex, Levaquin and ACE inhibitors)    Levaquin [Levofloxacin] Not Reported This Time       Family History   Problem Relation Age of Onset    Cancer Father     Heart Attack Brother     Stroke Brother     Coronary Artery Disease Brother        Social History   Substance Use Topics    Smoking status: Never Smoker    Smokeless tobacco: Never Used    Alcohol use Yes      Comment: occas       Current Outpatient Prescriptions   Medication Sig Dispense Refill    metoprolol succinate (TOPROL-XL) 25 mg XL tablet TAKE ONE TABLET BY MOUTH ONCE DAILY 90 Tab 0    losartan-hydroCHLOROthiazide (HYZAAR) 100-25 mg per tablet TAKE ONE TABLET BY MOUTH ONCE DAILY 90 Tab 0    metoprolol succinate (TOPROL-XL) 25 mg XL tablet TAKE ONE TABLET BY MOUTH ONCE DAILY 90 Tab 0    loratadine (CLARITIN) 10 mg tablet Take 1 Tab by mouth daily for 360 days. 30 Tab 11    levothyroxine (SYNTHROID) 137 mcg tablet Take 137 mcg by mouth Daily (before breakfast). TAKE ONE TABLET (137MCG) BY MOUTH ONCE DAILY BEFORE BREAKFAST 90 Tab 3    traMADol (ULTRAM) 50 mg tablet TAKE ONE TABLET BY MOUTH EVERY 8 HOURS AS NEEDED FOR PAIN 60 Tab 5    Cholecalciferol, Vitamin D3, 2,000 unit cap Take 4,000 Units by mouth.  OMEGA-3 FATTY ACIDS/FISH OIL (OMEGA 3 FISH OIL PO) Take 1 Cap by mouth daily.  MULTIVITAMINS (MULTIVITAMIN PO) Take 1 Tab by mouth daily. Plan:  Obtain authorization for testing from insurance company. Report to follow once testing, scoring, and interpretation completed. ? Organic based neurocognitive issues versus mood disorder or combination of same. ? Problems organic, functional, or both? This note will not be viewable in [address] 69year old with mild but ongoing and gradual decline in cognition along with adjustment related anxiety related to noticing her memory loss and is concerned about early signs of MCI versus dementia versus normal aging, attentiona, anxiety about aging, or combination of same?

## 2018-01-16 ENCOUNTER — OFFICE VISIT (OUTPATIENT)
Dept: NEUROLOGY | Age: 78
End: 2018-01-16

## 2018-01-16 DIAGNOSIS — F33.0 DEPRESSION, MAJOR, RECURRENT, MILD (HCC): ICD-10-CM

## 2018-01-16 DIAGNOSIS — F02.80 EARLY ONSET ALZHEIMER'S DEMENTIA WITHOUT BEHAVIORAL DISTURBANCE (HCC): Primary | ICD-10-CM

## 2018-01-16 DIAGNOSIS — G30.0 EARLY ONSET ALZHEIMER'S DEMENTIA WITHOUT BEHAVIORAL DISTURBANCE (HCC): Primary | ICD-10-CM

## 2018-01-16 DIAGNOSIS — F43.22 ADJUSTMENT DISORDER WITH ANXIETY: ICD-10-CM

## 2018-01-17 NOTE — PROGRESS NOTES
[address] [facility],[address]  [address]   [place] [address] [facility] [address]   [phone] Office   [phone] Fax      Neuropsychological Evaluation Report      Referral:  Néstor Stringer MD    Mary Delgado is a 69 y.o. right handed   female who was unaccompanied to the initial clinical interview on 1/15/18. Please refer to her medical records for details pertaining to her history. Briefly, the patient reported that she completed high school without history of repeating a grade or major learning problems and did well in school. She lives with her partner (they've been together 17 years). She is retired from [employer]. Over the past year, she has noticed a progressive decline in short term memory along with word finding difficulties and difficulties recalling names. She has a gradual decline over the past year. She forgets the content of conversations. Has to look at calendar to figure out what day it is and what she has to do that day. This has been getting worse over the past year, and it is gradual, and doesn't happen every day, and she is more and more concerned about it. No known family history of dementia. She has no background stroke, meningitis/encephalitis, SHITAL Fever, Lupus, Lyme, TBI, sz.  SHe takes her own medications and lays them out so she can see them. She does her finances without issue. She has no problems with driving. Partner - he had a stroke 1 1/2 years ago and has some memory issues. Appetite is too good. She does not sleep very well and is awake many times at night. The patient' has no prior history of anxiety, depression or other major psychiatric issues requiring formal treatment. She is, however, anxious about her memory. No counseling or psychiatrist.  No balance issues recently, but had a bad fall about a year ago and broke ribs.   No changes in sense of taste or smell. No other issues to report at this time. No previous neuropsych.        Neuropsychological Mental Status Exam (NMSE):  Historian: Good  Praxis: No UE apraxia  R/L Orientation: Intact to self and to other  Dress: within normal limits   Weight: within normal limits   Appearance/Hygiene: within normal limits   Gait: within normal limits   Assistive Devices: Glasses  Mood: within normal limits   Affect: within normal limits   Comprehension: within normal limits   Thought Process: within normal limits   Expressive Language: within normal limits   Receptive Language: within normal limits   Motor:  No cognitive or motor perseveration  ETOH: Rarely  Tobacco: Denied  Illicit: Denied  SI/HI: Denied  Psychosis: Denied  Insight: Within normal limits  Judgment: Within normal limits  Other Psych:      Past Medical History:   Diagnosis Date    Acute meniscal tear of knee     Bulging disc     DJD (degenerative joint disease)     l knee    GERD (gastroesophageal reflux disease)     HTN     Hypothyroid     OA (osteoarthritis)     Osteoporosis     Spinal stenosis        Past Surgical History:   Procedure Laterality Date    BREAST SURGERY PROCEDURE UNLISTED      1968 implants    COLONOSCOPY       HX HYSTERECTOMY  1979    HX ORTHOPAEDIC      left knee 2008    IR DILATE ESOPH STRICT      2004    NEUROLOGICAL PROCEDURE UNLISTED  2013    STRESS TEST - GXT  2005    neg       Allergies   Allergen Reactions    Ace Inhibitors Hives and Swelling    Ambien [Zolpidem] Other (comments)     HALLUCINATIONS    Keflex [Cephalexin] Other (comments)     Pt reports having hives from head to toe so her PCP included several drugs that she was taking at the time as the cause (Keflex, Levaquin and ACE inhibitors)    Levaquin [Levofloxacin] Not Reported This Time       Family History   Problem Relation Age of Onset    Cancer Father     Heart Attack Brother     Stroke Brother     Coronary Artery Disease Brother Social History   Substance Use Topics    Smoking status: Never Smoker    Smokeless tobacco: Never Used    Alcohol use Yes      Comment: occas       Current Outpatient Prescriptions   Medication Sig Dispense Refill    metoprolol succinate (TOPROL-XL) 25 mg XL tablet TAKE ONE TABLET BY MOUTH ONCE DAILY 90 Tab 0    losartan-hydroCHLOROthiazide (HYZAAR) 100-25 mg per tablet TAKE ONE TABLET BY MOUTH ONCE DAILY 90 Tab 0    metoprolol succinate (TOPROL-XL) 25 mg XL tablet TAKE ONE TABLET BY MOUTH ONCE DAILY 90 Tab 0    loratadine (CLARITIN) 10 mg tablet Take 1 Tab by mouth daily for 360 days. 30 Tab 11    levothyroxine (SYNTHROID) 137 mcg tablet Take 137 mcg by mouth Daily (before breakfast). TAKE ONE TABLET (137MCG) BY MOUTH ONCE DAILY BEFORE BREAKFAST 90 Tab 3    traMADol (ULTRAM) 50 mg tablet TAKE ONE TABLET BY MOUTH EVERY 8 HOURS AS NEEDED FOR PAIN 60 Tab 5    Cholecalciferol, Vitamin D3, 2,000 unit cap Take 4,000 Units by mouth.  OMEGA-3 FATTY ACIDS/FISH OIL (OMEGA 3 FISH OIL PO) Take 1 Cap by mouth daily.  MULTIVITAMINS (MULTIVITAMIN PO) Take 1 Tab by mouth daily. Plan:  Obtain authorization for testing from insurance company. Report to follow once testing, scoring, and interpretation completed. ? Organic based neurocognitive issues versus mood disorder or combination of same. ? Problems organic, functional, or both? This note will not be viewable in [address] 69year old with mild but ongoing and gradual decline in cognition along with adjustment related anxiety related to noticing her memory loss and is concerned about early signs of MCI versus dementia versus normal aging, attentiona, anxiety about aging, or combination of same?      Neuropsychological Test Results  Patient Testing 1/16/18 Report Completed 1/17/18  A Psychometrist Assisted w/ portions of this evaluation while under my direct  supervision    The following evaluation procedures/tests were administered: Neuropsychologist Performed, Interpreted, & Reported:  Neuropsychological Mental Status Exam, Revised Memory & Behavior Checklist,  Mini Mental Status Exam, Clock Drawing Test, Lorraine-Melzack Pain Questionnaire, Test Of Premorbid Functioning, History Taking  & Clinical Interview With The Patient, Review Of Available Records. Psychometrist Administered under Neuropsychologist Supervision & Neuropsychologist Interpreted & Neuropsychologist Reported:  Verbal Fluency Tests, Gregor & Gregor - Revised, Trailmaking Test Parts A & B, Wechsler Adult Intelligence Scale - IV, Heraclio Continuous Performance Test - III, [place] [employer] - 3, Grooved Pegboard, Macias Depression Inventory - II, Macias Anxiety Inventory, Personality Assessment Inventory. Test Findings:  Test Findings:  Note:  The patients raw data have been compared with currently available norms which include demographic corrections for age, gender, and/or education. Sometimes, the patients scores are compared to demographically similar individuals as close to the patients age, education level, etc., as possible. \"Average\" is viewed as being +/- 1 standard deviation (SD) from the stated mean for a particular test score. \"Low average\" is viewed as being between 1 and 2 SD below the mean, and above average is viewed as being 1 and 2 SD above the mean. Scores falling in the borderline range (between 1-1/2 and 2 SD below the mean) are viewed with particular attention as to whether they are normal or abnormal neurocognitive test scores. Other methods of inference in analyzing the test data are also utilized, including the pattern and range of scores in the profile, bilateral motor functions, and the presence, if any, of pathognomonic signs. Behaviorally, the patient was friendly and cooperative and appeared motivated to perform well during this examination.    Within this context, the results of this evaluation are viewed as a valid reflection of the patients actual neurocognitive and emotional status. The patient's score of 26/30 on the Mini-Mental Status Exam was borderline. In this regard, she was not oriented to date. Recall for three words after a brief delay was 0/3 correct. Clock drawing was within the impaired range as the hands were set incorrectly. Her structured word list fluency, as assessed by the FAS Test, was within the moderately impaired range with a T score of 29. Category fluency was within the moderately impaired range with a T score of 29. Confrontation naming ability, as assessed by the Gregor & Gregor - Revised, was within the moderately impaired range at 25/60 correct (T = 26). This pattern of performance is indicative of a patient who is at increased risk for day-to-day problems with verbal fluency or confrontation naming. The patient was administered the Heraclio Continuous Performance Test - III and review of the subscales within this instrument revealed moderate concerns for inattentiveness without impulsivity. This pattern of performance is indicative of a patient who is at increased risk for day-to-day problems with sustained visual attention/concentration. The patient is not showing problems with working memory capacity (23rd %ile) or processing speed (55th %ile) on the WAIS-IV. Her Verbal Comprehension Index score of 80 was within the low average range. Her Perceptual Reasoning Index score of 96 was average . These scores do not reflect a major decline in functioning based on an assessment of premorbid functioning. The patient was administered the [place] Verbal Learning Test  - 3 and generated an impaired range (and positive) learning curve over five repeated auditory word list learning trials. An interference trial was impaired. Free and cued, short and long delayed recall were all impaired. Recognition recall was impaired.   Forced choice recall was normal, suggesting that she put forth good effort on this test.  This pattern of performance is not indicative of a patient who is at increased risk for day-to-day problems with auditory learning and/or memory. Simple timed visual motor sequencing (Trailmaking Test Part A) was within the above average range with a T score of 60. Her performance on a similar, but more complex task of timed visual motor sequencing (Trailmaking Test Part B) was within the mildly to moderately impaired range with a T score of 34. She made only three sequencing errors on this latter completed test.  This pattern of performance is not indicative of a patient who is at increased risk for day-to-day problems with executive functioning. Fine motor dexterity was within the normal range bilaterally. This does not raise concern for a particularly lateralized brain dysfunction. The patient rated her current level of pain as \"0/5 -  No Pain\" on the Lorraine-Melzack Pain Questionnaire. Her Macias Depression Inventory -II score of 0 was within the minimally depressed range. Her Macias Anxiety Inventory score of 1 reflected minimal anxiety. The patient's responses on the Personality Assessment Inventory were deemed valid for interpretation, though a high levels of defensiveness was present. Despite this, there are some physical signs of depression as well as mild adjustment related anxiety/stress concerns. The personality profile is otherwise normal.      Impressions & Recommendations: This is an abnormal range Neuropsychological Evaluation with respect to neurocognitive functioning. In this regard, she is showing impairments with mental status, confrontation naming, verbal fluency, visual attention, auditory learning, and auditory memory.   At the same time, her performance across all other neurocognitive domains assessed, including processing speed, working memory, perceptual reasoning, verbal comprehension, bilateral fine motor dexterity, and executive functioning remain normal.  These are important strengths which will serve to mask underlying neurocognitive deficits at times. Emotionally, there is concern for mild depression and situational stress/anxiety. In my opinion, this appears to be a case of mild dementia exacerbated by mild depression and stress. I suggest consideration for medication for memory and attention and counseling to assist with mild mood issues. This issue with respect to dementia has been caught early on, and I hope she recognizes this as positive. She should be encouraged to remain as mentally, socially, and physically active as possible. I find her competent at this time, but she should assign a POA if this has not been done so already. She likely requires supervision for those domains pertaining to memory. This includes medication management supervision and supervision of financial dealings. Monitor driving safety issues over time. Baseline now established. Follow up prn. Clinical correlation is, of course, indicated. I will discuss these findings with the patient when she follows up with me in the near future. A follow up Neuropsychological Evaluation is indicated on a prn basis, especially if there are any cognitive and/or emotional changes. DIAGNOSES:  Dementia - Mild     Major Depression - Mild     Adjustment Disorder w/ Anxiety/Stress - Mild     The above information is based upon information currently available to me. If there is any additional information of which I am currently unaware, I would be more than happy to review it upon having it made available to me. Thank you for the opportunity to see this interesting individual.     Sincerely,       Matilde Colbert. Marlen Hernandez, Jose Manuel    CC:  Rosalind Myles MD    2 units -[id number]-  1.75 hours Record review. Review of history provided by patient. Review of collaborative information. Testing by Clinician.   Review of raw data. Scoring. Report writing of individual tests administered by Clinician. Integration of individual tests administered by psychometrist (that were previously reported and billed under psychometry code below) with testing by clinician and review of records/history/collaborative information. Case Conceptualization, Report writing. Coordination Of Care. 4 units  -52828 - 3.75 hours Psychometrist test prep, administration, and scoring under clinician's direct supervision. Clinical interpretation of individual tests administered by psychometrist .  Clinician report of individual tests administered by psychometrist.    \"Unit\" is defined by CPT/National Guidelines (31 - 60 minutes). Integral services including scoring of raw data, data interpretation, case conceptualization, report writing etcetera were initiated after the patient finished testing/raw data collected and was completed on the date the report was signed.

## 2018-01-31 RX ORDER — LOSARTAN POTASSIUM AND HYDROCHLOROTHIAZIDE 25; 100 MG/1; MG/1
TABLET ORAL
Qty: 90 TAB | Refills: 0 | Status: SHIPPED | OUTPATIENT
Start: 2018-01-31 | End: 2018-05-03 | Stop reason: SDUPTHER

## 2018-01-31 RX ORDER — METOPROLOL SUCCINATE 25 MG/1
TABLET, EXTENDED RELEASE ORAL
Qty: 90 TAB | Refills: 0 | Status: SHIPPED | OUTPATIENT
Start: 2018-01-31 | End: 2018-05-08 | Stop reason: SDUPTHER

## 2018-03-13 ENCOUNTER — OFFICE VISIT (OUTPATIENT)
Dept: NEUROLOGY | Age: 78
End: 2018-03-13

## 2018-03-13 DIAGNOSIS — G30.0 EARLY ONSET ALZHEIMER'S DEMENTIA WITHOUT BEHAVIORAL DISTURBANCE (HCC): Primary | ICD-10-CM

## 2018-03-13 DIAGNOSIS — F02.80 EARLY ONSET ALZHEIMER'S DEMENTIA WITHOUT BEHAVIORAL DISTURBANCE (HCC): Primary | ICD-10-CM

## 2018-03-13 DIAGNOSIS — F33.0 DEPRESSION, MAJOR, RECURRENT, MILD (HCC): ICD-10-CM

## 2018-03-13 NOTE — PROGRESS NOTES
Office feedback session with the patient today. I reviewed the results of the recent Neuropsychological Evaluation, including discussing individual tests as well as patient's areas of neurocognitive strength versus weakness. Education was provided regarding my diagnostic impressions, and we discussed treatment plan/options. I also answered numerous questions related to the clinical findings, including discussing various methods to improve cognition and mood. Counseling provided regarding mood and cognition. Talked about mild dementia and enhanced by mood. Needs to see neurology, psychiatry, pcp for treatment. Discussed baseline levesl and day-to-day supervision needs. CBT and supportive psychotherapy techniques were utilized. The patient will follow up with the referring provider, and reported being very pleased with the services provided. [facility] with [facility] prn. 20 minutes with patient, record review, coordination of care. Records provided. We discussed: This is an abnormal range Neuropsychological Evaluation with respect to neurocognitive functioning. In this regard, she is showing impairments with mental status, confrontation naming, verbal fluency, visual attention, auditory learning, and auditory memory. At the same time, her performance across all other neurocognitive domains assessed, including processing speed, working memory, perceptual reasoning, verbal comprehension, bilateral fine motor dexterity, and executive functioning remain normal.  These are important strengths which will serve to mask underlying neurocognitive deficits at times. Emotionally, there is concern for mild depression and situational stress/anxiety.                             In my opinion, this appears to be a case of mild dementia exacerbated by mild depression and stress. I suggest consideration for medication for memory and attention and counseling to assist with mild mood issues.   This issue with respect to dementia has been caught early on, and I hope she recognizes this as positive. She should be encouraged to remain as mentally, socially, and physically active as possible. I find her competent at this time, but she should assign a POA if this has not been done so already. She likely requires supervision for those domains pertaining to memory. This includes medication management supervision and supervision of financial dealings. Monitor driving safety issues over time. Baseline now established. Follow up prn. Clinical correlation is, of course, indicated.                              I will discuss these findings with the patient when she follows up with me in the near future.   A follow up Neuropsychological Evaluation is indicated on a prn basis, especially if there are any cognitive and/or emotional changes.       DIAGNOSES:                                 Dementia - Mild                                                                    Major Depression - Mild                                                                    Adjustment Disorder w/ Anxiety/Stress - Mild

## 2018-03-20 ENCOUNTER — OFFICE VISIT (OUTPATIENT)
Dept: NEUROLOGY | Age: 78
End: 2018-03-20

## 2018-03-20 ENCOUNTER — TELEPHONE (OUTPATIENT)
Dept: NEUROLOGY | Age: 78
End: 2018-03-20

## 2018-03-20 VITALS
HEIGHT: 63 IN | DIASTOLIC BLOOD PRESSURE: 82 MMHG | TEMPERATURE: 99.1 F | BODY MASS INDEX: 27.14 KG/M2 | OXYGEN SATURATION: 98 % | HEART RATE: 86 BPM | SYSTOLIC BLOOD PRESSURE: 144 MMHG | WEIGHT: 153.2 LBS | RESPIRATION RATE: 16 BRPM

## 2018-03-20 DIAGNOSIS — F03.90 DEMENTIA WITHOUT BEHAVIORAL DISTURBANCE, UNSPECIFIED DEMENTIA TYPE: Primary | ICD-10-CM

## 2018-03-20 RX ORDER — DONEPEZIL HYDROCHLORIDE 5 MG/1
5 TABLET, FILM COATED ORAL DAILY
Qty: 30 TAB | Refills: 0 | Status: SHIPPED | OUTPATIENT
Start: 2018-03-20 | End: 2018-06-29 | Stop reason: ALTCHOICE

## 2018-03-20 RX ORDER — LEVOTHYROXINE SODIUM 125 UG/1
125 TABLET ORAL
COMMUNITY
Start: 2018-03-09 | End: 2018-05-09

## 2018-03-20 RX ORDER — DONEPEZIL HYDROCHLORIDE 10 MG/1
10 TABLET, FILM COATED ORAL DAILY
Qty: 90 TAB | Refills: 3 | Status: SHIPPED | OUTPATIENT
Start: 2018-03-20 | End: 2018-10-17 | Stop reason: SDUPTHER

## 2018-03-20 NOTE — PATIENT INSTRUCTIONS
Patient history reviewed and patient examined. Will supplement the memory workup with imaging of the head EEG some lab work and start on the memory supportive drug Aricept. Would like to see her back in 2 months to see how she is doing at that point and hopefully all testing will be completed.   Stay is reasonably and safely busy as possible about with regard to mental activities and physical.

## 2018-03-20 NOTE — PROGRESS NOTES
Mild to moderate dementia. Neurology Consult      Subjective:      Elder Gloria is a 69 y.o. female who comes in with her daughter and was referred to neuropsych by primary care for memory concerns. The history as I understand it included about 1 years worth of short-term memory difficulties word finding etc.  Apparently driving was okay and had been self-sufficient. Lives with her partner of about 17 years. 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  Neuropsych testing already obtained include an assessment of mild dementiad enhance by mild degrees of depression and stress. Suggested a memory medicine possibly to attention as well counseling and stay busy. Was found to be competent and needed a power of . Suggested some supervision along the finances and medication and. Monitor driving. Has already talked with the neuropsychologist between patient and daughter and they have this understanding baseline. Went over the concept of dementia and the fact that the medicine we use is supportive but does not cure her restrain disease. Aricept will be started and then transition to a maintenance dose after 30 days and then I will see her back in 2 months. Understands it is very important for her to assume some self-help measures such as staying physically and mentally engaged as much as possible. Socialization is always good and music helps and arts and crafts to help etc. physical activity such as walking and simple yard work are an investment in her future as well. Will add to the workup beyond lab work already obtained that included a CMP CBC and thyroid with a B12 and folate EEG and brain MRI. Current Outpatient Prescriptions   Medication Sig Dispense Refill    levothyroxine (SYNTHROID) 125 mcg tablet Take 125 mcg by mouth Daily (before breakfast).  donepezil (ARICEPT) 5 mg tablet Take 1 Tab by mouth daily.  Indications: MILD TO MODERATE ALZHEIMER'S TYPE DEMENTIA 30 Tab 0    donepezil (ARICEPT) 10 mg tablet Take 1 Tab by mouth daily. Indications: MILD TO MODERATE ALZHEIMER'S TYPE DEMENTIA 90 Tab 3    metoprolol succinate (TOPROL-XL) 25 mg XL tablet TAKE ONE TABLET BY MOUTH ONCE DAILY 90 Tab 0    losartan-hydroCHLOROthiazide (HYZAAR) 100-25 mg per tablet TAKE ONE TABLET BY MOUTH ONCE DAILY 90 Tab 0    metoprolol succinate (TOPROL-XL) 25 mg XL tablet TAKE ONE TABLET BY MOUTH ONCE DAILY 90 Tab 0    loratadine (CLARITIN) 10 mg tablet Take 1 Tab by mouth daily for 360 days. 30 Tab 11    traMADol (ULTRAM) 50 mg tablet TAKE ONE TABLET BY MOUTH EVERY 8 HOURS AS NEEDED FOR PAIN 60 Tab 5    Cholecalciferol, Vitamin D3, 2,000 unit cap Take 4,000 Units by mouth.  OMEGA-3 FATTY ACIDS/FISH OIL (OMEGA 3 FISH OIL PO) Take 1 Cap by mouth daily.  MULTIVITAMINS (MULTIVITAMIN PO) Take 1 Tab by mouth daily.  levothyroxine (SYNTHROID) 137 mcg tablet Take 137 mcg by mouth Daily (before breakfast).  TAKE ONE TABLET (137MCG) BY MOUTH ONCE DAILY BEFORE BREAKFAST (Patient not taking: Reported on 3/20/2018) 90 Tab 3      Allergies   Allergen Reactions    Ace Inhibitors Hives and Swelling    Ambien [Zolpidem] Other (comments)     HALLUCINATIONS    Keflex [Cephalexin] Other (comments)     Pt reports having hives from head to toe so her PCP included several drugs that she was taking at the time as the cause (Keflex, Levaquin and ACE inhibitors)    Levaquin [Levofloxacin] Not Reported This Time     Past Medical History:   Diagnosis Date    Acute meniscal tear of knee     Bulging disc     DJD (degenerative joint disease)     l knee    GERD (gastroesophageal reflux disease)     HTN     Hypothyroid     OA (osteoarthritis)     Osteoporosis     Spinal stenosis       Past Surgical History:   Procedure Laterality Date    BREAST SURGERY PROCEDURE UNLISTED      1968 implants    COLONOSCOPY       HX HYSTERECTOMY  1979    HX ORTHOPAEDIC      left knee 2008    IR DILATE ESOPH STRICT      2004    NEUROLOGICAL PROCEDURE UNLISTED  2013    STRESS TEST - GXT  2005    neg      Social History     Social History    Marital status:      Spouse name: N/A    Number of children: N/A    Years of education: N/A     Occupational History    Not on file. Social History Main Topics    Smoking status: Never Smoker    Smokeless tobacco: Never Used    Alcohol use Yes      Comment: occas    Drug use: No    Sexual activity: Not on file     Other Topics Concern    Not on file     Social History Narrative      Family History   Problem Relation Age of Onset    Cancer Father     Heart Attack Brother     Stroke Brother     Coronary Artery Disease Brother       Visit Vitals    /82 (BP 1 Location: Left arm, BP Patient Position: Sitting)    Pulse 86    Temp 99.1 °F (37.3 °C) (Oral)    Resp 16    Ht 5' 3\" (1.6 m)    Wt 69.5 kg (153 lb 3.2 oz)    SpO2 98%    BMI 27.14 kg/m2        Review of Systems:   A comprehensive review of systems was negative except for that written in the HPI. Neuro Exam:     Appearance: The patient is well developed, well nourished, provides a partial coherent history and is in no acute distress. Mental Status: Oriented to day date and month and called the year 2008. Mood and affect appropriate. Cranial Nerves:   Intact visual fields. Fundi are benign. JUAN J, EOM's full, no nystagmus, no ptosis. Facial sensation is normal. Corneal reflexes are intact. Facial movement is symmetric. Hearing is normal bilaterally. Palate is midline with normal sternocleidomastoid and trapezius muscles are normal. Tongue is midline. Motor:  5/5 strength in upper and lower proximal and distal muscles. Normal bulk and tone. No fasciculations. Reflexes:   Deep tendon reflexes 2+/4 and symmetrical.   Sensory:   Normal to touch, pinprick and vibration. Gait:  Normal gait. Tremor: Partial No tremor noted. Cerebellar:  No cerebellar signs present. Neurovascular:  Normal heart sounds and regular rhythm, peripheral pulses intact, and no carotid bruits. Assessment:   Mild to moderate dementia. Will start on the support drug Aricept and went over the concept of support. Will transition to the maintenance drug after 30 days and went over side effects. Very importantly understands that she needs to be motivated to stay as physically and mentally engaged and safely so as possible to complement the medication support. Will sure of the difference between testing done and testing to be done with an MRI of the brain EEG lab work etc.      Plan:   Revisit 2 months.   Signed by :  Lupillo Braxton MD

## 2018-03-20 NOTE — MR AVS SNAPSHOT
[facility] 
 
 
 [address] [place] [address] [address] [phone] Patient: Ainsley Tapia MRN:  [id number] Visit Information Date & Time Provider Department Dept. Phone Encounter #  
 3/20/2018 10:00 AM Jeff Ceballos MD [facility] Neurology [facility] [phone] [id number] Follow-up Instructions Return in about 2 months (around 2018). Your Appointments 3/28/2018 10:00 AM  
PROCEDURE with EEG SCHEDULE  [facility] Appt Note: dementia cristiano  
 [address] [address] [phone]  
  
   
 Brannon Raqueliaside  
  
    
 2018 11:10 AM  
ESTABLISHED PATIENT with Everette Schirmer, MD  
[address] [place] [address] [facility]: 6mo [facility]uv  
 [address] [address]  
[phone]  
  
   
 [address] [address]  
  
    
 2018 10:20 AM  
Follow Up with Jeff Ceballos MD  
[address] Appt Note: follow up [address] [place] [address] [address] [phone]  
  
   
 [address] [address] [address] Upcoming Health Maintenance Date Due DTaP/Tdap/Td series (1 - Tdap) 10/31/1961 MEDICARE YEARLY EXAM 3/28/2018 GLAUCOMA SCREENING Q2Y 3/27/2019 COLONOSCOPY 3/15/2026 Allergies as of 3/20/2018  Review Complete On: 3/20/2018 By: Jeff Ceballos MD  
  
 Severity Noted Reaction Type Reactions Ace Inhibitors  05/10/2010    Hives, Swelling Ambien [Zolpidem]  05/10/2010    Other (comments) HALLUCINATIONS Keflex [Cephalexin]  05/10/2010    Other (comments)  Pt reports having hives from head to toe so her PCP included several drugs that she was taking at the time as the cause (Keflex, Levaquin and ACE inhibitors) Levaquin [Levofloxacin]  05/10/2010    Not Reported This Time Current Immunizations  Reviewed on 10/31/2017 Name Date Influenza High Dose Vaccine PF 10/31/2017 Influenza Vaccine 9/20/2014 Influenza Vaccine Whole 10/10/2011 Pneumococcal Conjugate (PCV-13) 8/24/2015 Pneumococcal Vaccine (Unspecified Type) 8/21/2010 Zoster Vaccine, Live 12/21/2013 Not reviewed this visit You Were Diagnosed With   
  
 Codes Comments Dementia without behavioral disturbance, unspecified dementia type    -  Primary ICD-10-CM: F03.90 ICD-9-CM: 294.20 Vitals BP Pulse Temp Resp Height(growth percentile) Weight(growth percentile) 144/82 (BP 1 Location: Left arm, BP Patient Position: Sitting) 86 99.1 °F (37.3 °C) (Oral) 16 5' 3\" (1.6 m) 153 lb 3.2 oz (69.5 kg) SpO2 BMI OB Status Smoking Status 98% 27.14 kg/m2 Hysterectomy Never Smoker Vitals History BMI and BSA Data Body Mass Index Body Surface Area  
 27.14 kg/m 2 1.76 m 2 Preferred Pharmacy Pharmacy Name Phone [address], [address] [place] [address] [phone] Your Updated Medication List  
  
   
This list is accurate as of 3/20/18 10:51 AM.  Always use your most recent med list.  
  
  
  
  
 Cholecalciferol (Vitamin D3) 2,000 unit Cap capsule Commonly known as:  VITAMIN D3 Take 4,000 Units by mouth. * donepezil 5 mg tablet Commonly known as:  ARICEPT Take 1 Tab by mouth daily. Indications: MILD TO MODERATE ALZHEIMER'S TYPE DEMENTIA * donepezil 10 mg tablet Commonly known as:  ARICEPT Take 1 Tab by mouth daily. Indications: MILD TO MODERATE ALZHEIMER'S TYPE DEMENTIA * levothyroxine 137 mcg tablet Commonly known as:  SYNTHROID Take 137 mcg by mouth Daily (before breakfast). TAKE ONE TABLET (137MCG) BY MOUTH ONCE DAILY BEFORE BREAKFAST * levothyroxine 125 mcg tablet Commonly known as:  SYNTHROID Take 125 mcg by mouth Daily (before breakfast). loratadine 10 mg tablet Commonly known as:  Harvis Cardinal Take 1 Tab by mouth daily for 360 days. losartan-hydroCHLOROthiazide 100-25 mg per tablet Commonly known as:  HYZAAR  
TAKE ONE TABLET BY MOUTH ONCE DAILY * metoprolol succinate 25 mg XL tablet Commonly known as:  TOPROL-XL  
TAKE ONE TABLET BY MOUTH ONCE DAILY * metoprolol succinate 25 mg XL tablet Commonly known as:  TOPROL-XL  
TAKE ONE TABLET BY MOUTH ONCE DAILY MULTIVITAMIN PO Take 1 Tab by mouth daily. OMEGA 3 FISH OIL PO Take 1 Cap by mouth daily. traMADol 50 mg tablet Commonly known as:  ULTRAM  
TAKE ONE TABLET BY MOUTH EVERY 8 HOURS AS NEEDED FOR PAIN  
  
 * Notice: This list has 6 medication(s) that are the same as other medications prescribed for you. Read the directions carefully, and ask your doctor or other care provider to review them with you. Prescriptions Printed Refills  
 donepezil (ARICEPT) 10 mg tablet 3 Sig: Take 1 Tab by mouth daily. Indications: MILD TO MODERATE ALZHEIMER'S TYPE DEMENTIA Class: Print Route: Oral  
  
Prescriptions Sent to Pharmacy Refills  
 donepezil (ARICEPT) 5 mg tablet 0 Sig: Take 1 Tab by mouth daily. Indications: MILD TO MODERATE ALZHEIMER'S TYPE DEMENTIA Class: Normal  
 Pharmacy: [address] [place] [address]  Ph #: [phone] Route: Oral  
  
We Performed the Following VITAMIN B12 & FOLATE [21997 CPT(R)] Follow-up Instructions Return in about 2 months (around 5/20/2018). To-Do List   
 03/21/2018 Neurology:  EEG AMB NEURO   
  
 03/27/2018 Imaging:  MRI BRAIN WO CONT Patient Instructions Patient history reviewed and patient examined.   Will supplement the memory workup with imaging of the head EEG some lab work and start on the memory supportive drug Aricept. Would like to see her back in 2 months to see how she is doing at that point and hopefully all testing will be completed. Stay is reasonably and safely busy as possible about with regard to mental activities and physical. 
 
 
  
Introducing [facility]! Dear Devon Sigala: Thank you for requesting a [employer] account. Our records indicate that you already have an active [employer] account. You can access your account anytime at [email or web address] Did you know that you can access your hospital and ER discharge instructions at any time in [employer]? You can also review all of your test results from your hospital stay or ER visit. Additional Information If you have questions, please visit the Frequently Asked Questions section of the [employer] website at [email or web address] Remember, [employer] is NOT to be used for urgent needs. For medical emergencies, dial 911. Now available from your iPhone and Android! Please provide this summary of care documentation to your next provider. Your primary care clinician is listed as GITA ISLAS. If you have any questions after today's visit, please call [phone].

## 2018-03-21 LAB
FOLATE SERPL-MCNC: >20 NG/ML
VIT B12 SERPL-MCNC: 667 PG/ML (ref 232–1245)

## 2018-03-28 ENCOUNTER — OFFICE VISIT (OUTPATIENT)
Dept: NEUROLOGY | Age: 78
End: 2018-03-28

## 2018-03-28 DIAGNOSIS — F03.90 DEMENTIA WITHOUT BEHAVIORAL DISTURBANCE, UNSPECIFIED DEMENTIA TYPE: ICD-10-CM

## 2018-03-28 NOTE — PROGRESS NOTES
This was an elective routine EEG for dementia concerns. Routine scalp leads were applied according to the 10-20 International system. EKG monitor as well. The consistent background rhythm was 8 plus hertz which appropriately modulated with eye opening and closure. No disclosures of focal slowing or spontaneous electrocerebral discharges seen. EKG grossly sinus rhythm. Hyperventilation not performed. Photic stimulation produced a uniform photic drive at different harmonics. No technician note as to any untoward patient movement behavior mannerisms or vocalizations. Impression: This is a normal awake routine EEG as stipulated. Clinical correlation is advised.   MIREYA RESENDEZ.

## 2018-03-30 ENCOUNTER — HOSPITAL ENCOUNTER (OUTPATIENT)
Dept: MRI IMAGING | Age: 78
Discharge: HOME | End: 2018-03-30
Attending: SPECIALIST
Payer: MEDICARE

## 2018-03-30 DIAGNOSIS — F03.90 DEMENTIA WITHOUT BEHAVIORAL DISTURBANCE, UNSPECIFIED DEMENTIA TYPE: ICD-10-CM

## 2018-03-30 PROCEDURE — 70551 MRI BRAIN STEM W/O DYE: CPT

## 2018-04-13 ENCOUNTER — TELEPHONE (OUTPATIENT)
Dept: FAMILY MEDICINE CLINIC | Age: 78
End: 2018-04-13

## 2018-04-16 RX ORDER — DONEPEZIL HYDROCHLORIDE 10 MG/1
10 TABLET, FILM COATED ORAL DAILY
Qty: 90 TAB | Refills: 1 | Status: SHIPPED | OUTPATIENT
Start: 2018-04-16 | End: 2018-06-29 | Stop reason: SDUPTHER

## 2018-05-03 RX ORDER — LOSARTAN POTASSIUM AND HYDROCHLOROTHIAZIDE 25; 100 MG/1; MG/1
TABLET ORAL
Qty: 90 TAB | Refills: 0 | Status: SHIPPED | OUTPATIENT
Start: 2018-05-03 | End: 2018-05-08 | Stop reason: SDUPTHER

## 2018-05-08 ENCOUNTER — HOSPITAL ENCOUNTER (OUTPATIENT)
Dept: LAB | Age: 78
Discharge: HOME | End: 2018-05-08
Payer: MEDICARE

## 2018-05-08 ENCOUNTER — OFFICE VISIT (OUTPATIENT)
Dept: FAMILY MEDICINE CLINIC | Age: 78
End: 2018-05-08

## 2018-05-08 VITALS
DIASTOLIC BLOOD PRESSURE: 77 MMHG | HEART RATE: 70 BPM | HEIGHT: 63 IN | TEMPERATURE: 98.3 F | RESPIRATION RATE: 16 BRPM | BODY MASS INDEX: 25.89 KG/M2 | SYSTOLIC BLOOD PRESSURE: 127 MMHG | OXYGEN SATURATION: 96 % | WEIGHT: 146.1 LBS

## 2018-05-08 DIAGNOSIS — Z00.00 ROUTINE GENERAL MEDICAL EXAMINATION AT A HEALTH CARE FACILITY: Primary | ICD-10-CM

## 2018-05-08 DIAGNOSIS — Z71.89 ADVANCE CARE PLANNING: ICD-10-CM

## 2018-05-08 DIAGNOSIS — I10 ESSENTIAL HYPERTENSION: ICD-10-CM

## 2018-05-08 DIAGNOSIS — E03.9 ACQUIRED HYPOTHYROIDISM: ICD-10-CM

## 2018-05-08 PROCEDURE — 85025 COMPLETE CBC W/AUTO DIFF WBC: CPT

## 2018-05-08 PROCEDURE — 84443 ASSAY THYROID STIM HORMONE: CPT

## 2018-05-08 PROCEDURE — 80053 COMPREHEN METABOLIC PANEL: CPT

## 2018-05-08 RX ORDER — METOPROLOL SUCCINATE 25 MG/1
TABLET, EXTENDED RELEASE ORAL
Qty: 90 TAB | Refills: 3 | Status: SHIPPED | OUTPATIENT
Start: 2018-05-08 | End: 2018-11-12 | Stop reason: SDUPTHER

## 2018-05-08 RX ORDER — LOSARTAN POTASSIUM AND HYDROCHLOROTHIAZIDE 25; 100 MG/1; MG/1
TABLET ORAL
Qty: 90 TAB | Refills: 3 | Status: SHIPPED | OUTPATIENT
Start: 2018-05-08 | End: 2018-07-03

## 2018-05-08 NOTE — PROGRESS NOTES
Chief Complaint   Patient presents with    Hypertension    Labs     fasting today    Medication Refill     1. Have you been to the ER, urgent care clinic since your last visit? Hospitalized since your last visit? No    2. Have you seen or consulted any other health care providers outside of the [address] [place] [address] since your last visit? Include any pap smears or colon screening. Yes Where: Neurology:Dr Porter        Medicare Wellness Exam:    Chief Complaint   Patient presents with    Hypertension    Labs     fasting today    Medication Refill     she is a 69y.o. year old female who presents for evaluation for their Medicare Wellness Visit. Wenda Malathi is completed and assessed=yes  Depression Screen is completed and assessed=yes  Medication list reviewed and adjusted for accuracy=yes  Immunizations reviewed and updated=yes  Health/Preventative Screenings reviewed and updated=yes  ADL Functions reviewed=yes    Patient Active Problem List    Diagnosis    History of right shoulder replacement    Advanced care planning/counseling discussion     Has a LW,asked to bring in     [address] care planning     Pt has LW      Asthma    Bronchitis    Bronchospasm with bronchitis, acute    DDD (degenerative disc disease), lumbar    Tachycardia    HTN (hypertension)    Hypothyroid    OA (osteoarthritis)    GERD (gastroesophageal reflux disease)    Osteoporosis    Left knee DJD       Reviewed PmHx, RxHx, FmHx, SocHx, AllgHx and updated and dated in the chart.     Review of Systems - negative except as listed above in the HPI    Objective:     Vitals:    05/08/18 1101   BP: 127/77   Pulse: 70   Resp: 16   Temp: 98.3 °F (36.8 °C)   TempSrc: Oral   SpO2: 96%   Weight: 146 lb 1.6 oz (66.3 kg)   Height: 5' 3\" (1.6 m)     Physical Examination: General appearance - alert, well appearing, and in no distress  Nose - normal and patent, no erythema, discharge or polyps  Neck - supple, no significant adenopathy  Chest - clear to auscultation, no wheezes, rales or rhonchi, symmetric air entry  Heart - normal rate, regular rhythm, normal S1, S2, no murmurs, rubs, clicks or gallops  Abdomen - soft, nontender, nondistended, no masses or organomegaly  Extremities - peripheral pulses normal, no pedal edema, no clubbing or cyanosis    Assessment/ Plan:   Diagnoses and all orders for this visit:    1. Routine general medical examination at a health care facility  -see below    2. Essential hypertension  -     losartan-hydroCHLOROthiazide (HYZAAR) 100-25 mg per tablet; TAKE ONE TABLET BY MOUTH ONCE DAILY  -     metoprolol succinate (TOPROL-XL) 25 mg XL tablet; TAKE ONE TABLET BY MOUTH ONCE DAILY  -at goal    3. Acquired hypothyroidism  -check labs  -pt does not have [facility] and did not change thyroid rx since it was off  -is taking rx    4. Advance care planning  I discussed with patient living will and gave a legal form for patient to fill out and bring back to the office.         -Pain evaluation performed in office  -Cognitive Screen performed in office-stable mild dementia  -Depression Screen, Fall risks (by up and go test)  and ADL functionality were addressed  -Medication list updated and reviewed for any changes   -A comprehensive review of medical issues and a plan was formulated  -End of life planning was addressed with pt   -Health Screenings for preventions were addressed and a plan was formulated  -Shingles Vaccine was recommended  -Discussed with patient cancer risk factors and appropriate screenings for age  -Patient evaluated for colonoscopy and referred if needed per screeing criteria  -Labs from previous visits were discussed with patient   -Discussed with patient diet and exercise and formulated a plan as needed  -An Advanced care plan was developed with the patient.  -Alcohol screening performed and was negative    -Follow-up Disposition:  Return if symptoms worsen or fail to improve.     I have discussed the diagnosis with the patient and the intended plan as seen in the above orders. The patient understands and agrees with the plan. The patient has received an after-visit summary and questions were answered concerning future plans. Medication Side Effects and Warnings were discussed with patien  Patient Labs were reviewed and or requested  Patient Past Records were reviewed and or requested    There are no Patient Instructions on file for this visit.       Mariaon Donohue M.D.

## 2018-05-08 NOTE — LETTER
5/10/2018 8:58 AM 
 
Ms. Arrington Monday [address] [place] [address] [phone] Dear Nury Monday: 
 
Please find your most recent results below. Resulted Orders TSH 3RD GENERATION Result Value Ref Range TSH 9.520 (H) 0.450 - 4.500 uIU/mL Narrative Performed at:  [address]  [id number] : Jaya Starr MD, Phone:  [phone] CBC WITH AUTOMATED DIFF Result Value Ref Range WBC 3.8 3.4 - 10.8 x10E3/uL  
 RBC 4.58 3.77 - 5.28 x10E6/uL HGB 13.9 11.1 - 15.9 g/dL HCT 40.9 34.0 - 46.6 % MCV 89 79 - 97 fL  
 MCH 30.3 26.6 - 33.0 pg  
 MCHC 34.0 31.5 - 35.7 g/dL  
 RDW 13.1 12.3 - 15.4 % PLATELET 715 682 - 752 x10E3/uL NEUTROPHILS 55 Not Estab. % Lymphocytes 30 Not Estab. % MONOCYTES 9 Not Estab. % EOSINOPHILS 4 Not Estab. % BASOPHILS 2 Not Estab. %  
 ABS. NEUTROPHILS 2.1 1.4 - 7.0 x10E3/uL Abs Lymphocytes 1.2 0.7 - 3.1 x10E3/uL  
 ABS. MONOCYTES 0.4 0.1 - 0.9 x10E3/uL  
 ABS. EOSINOPHILS 0.1 0.0 - 0.4 x10E3/uL  
 ABS. BASOPHILS 0.1 0.0 - 0.2 x10E3/uL IMMATURE GRANULOCYTES 0 Not Estab. %  
 ABS. IMM. GRANS. 0.0 0.0 - 0.1 x10E3/uL Narrative Performed at:  [address]  [id number] : Jaya Starr MD, Phone:  [phone] METABOLIC PANEL, COMPREHENSIVE Result Value Ref Range Glucose 86 65 - 99 mg/dL BUN 13 8 - 27 mg/dL Creatinine 0.75 0.57 - 1.00 mg/dL GFR est non-AA 78 >59 mL/min/1.73 GFR est AA 90 >59 mL/min/1.73  
 BUN/Creatinine ratio 17 12 - 28 Sodium 144 134 - 144 mmol/L Potassium 3.8 3.5 - 5.2 mmol/L Chloride 101 96 - 106 mmol/L  
 CO2 25 18 - 29 mmol/L Calcium 10.0 8.7 - 10.3 mg/dL Protein, total 6.6 6.0 - 8.5 g/dL Albumin 4.1 3.5 - 4.8 g/dL GLOBULIN, TOTAL 2.5 1.5 - 4.5 g/dL A-G Ratio 1.6 1.2 - 2.2 Bilirubin, total 0.8 0.0 - 1.2 mg/dL Alk.  phosphatase 82 39 - 117 IU/L  
 AST (SGOT) 30 0 - 40 IU/L  
 ALT (SGPT) 20 0 - 32 IU/L Narrative Performed at:  [address]  [id number] : Silvana Jefferson MD, Phone:  [phone] RECOMMENDATIONS: 
  Notes Recorded by Carol Toro LPN on 0/86/2625 at 5:30 AM 
Patient called and verified. Reviewed lab results and new medication sent to [facility]. Reviewed dosage change and new appointment for follow up scheduled for 8/13/2018 9:00 am. Patient verbalizes understanding. 
------ Notes Recorded by Carol Toro LPN on 3/1/0007 at 9:93 PM 
lvm to return call to review lab results 
------ Notes Recorded by Carlos Hardin MD on 5/9/2018 at 9:48 AM 
Mail labs to pt and call her--thyroid rx is too low--new rx called in and check in 3months Opal Gomes Please call me if you have any questions: [phone] Sincerely, Carlos Hardin MD

## 2018-05-08 NOTE — MR AVS SNAPSHOT
[address] [address] 
[phone] Patient: Vonnie Dunbar MRN:  [id number] Visit Information Date & Time Provider Department Dept. Phone Encounter #  
 2018 10:50 AM Pipe Arboleda MD [address] [facility] [phone] [id number] Follow-up Instructions Return if symptoms worsen or fail to improve. Your Appointments 2018 10:20 AM  
Follow Up with Mabel Chambers MD  
[facility]) Appt Note: follow up [address] [place] [address] [facility] [address] [phone]  
  
   
 [address] [address] [address] Upcoming Health Maintenance Date Due DTaP/Tdap/Td series (1 - Tdap) 10[birth year]31/1961 MEDICARE YEARLY EXAM 3/28/2018 Influenza Age 5 to Adult 2018 GLAUCOMA SCREENING Q2Y 3/27/2019 COLONOSCOPY 3/15/2026 Allergies as of 2018  Review Complete On: 2018 By: Pipe Arboleda MD  
  
 Severity Noted Reaction Type Reactions Ace Inhibitors  05/10/2010    Hives, Swelling Ambien [Zolpidem]  05/10/2010    Other (comments) HALLUCINATIONS Keflex [Cephalexin]  05/10/2010    Other (comments) Pt reports having hives from head to toe so her PCP included several drugs that she was taking at the time as the cause (Keflex, Levaquin and ACE inhibitors) Levaquin [Levofloxacin]  05/10/2010    Not Reported This Time Current Immunizations  Reviewed on 10/31/2017 Name Date Influenza High Dose Vaccine PF 10/31/2017 Influenza Vaccine 2014 Influenza Vaccine Whole 10/10/2011 Pneumococcal Conjugate (PCV-13) 2015 Pneumococcal Vaccine (Unspecified Type) 2010 Zoster Vaccine, Live 2013 Not reviewed this visit You Were Diagnosed With   
  
 Codes Comments Routine general medical examination at a health care facility    -  Primary ICD-10-CM: Z00.00 ICD-9-CM: V70.0 Essential hypertension     ICD-10-CM: I10 
ICD-9-CM: 401.9 Acquired hypothyroidism     ICD-10-CM: E03.9 ICD-9-CM: 244.9 Advance care planning     ICD-10-CM: Z71.89 ICD-9-CM: V65.49 Vitals BP Pulse Temp Resp Height(growth percentile) Weight(growth percentile) 127/77 70 98.3 °F (36.8 °C) (Oral) 16 5' 3\" (1.6 m) 146 lb 1.6 oz (66.3 kg) SpO2 BMI OB Status Smoking Status 96% 25.88 kg/m2 Hysterectomy Never Smoker BMI and BSA Data Body Mass Index Body Surface Area  
 25.88 kg/m 2 1.72 m 2 Preferred Pharmacy Pharmacy Name Phone [address], [address] [place] [address] [phone] Your Updated Medication List  
  
   
This list is accurate as of 5/8/18 11:26 AM.  Always use your most recent med list.  
  
  
  
  
 Cholecalciferol (Vitamin D3) 2,000 unit Cap capsule Commonly known as:  VITAMIN D3 Take 4,000 Units by mouth. * donepezil 5 mg tablet Commonly known as:  ARICEPT Take 1 Tab by mouth daily. Indications: MILD TO MODERATE ALZHEIMER'S TYPE DEMENTIA * donepezil 10 mg tablet Commonly known as:  ARICEPT Take 1 Tab by mouth daily. Indications: MILD TO MODERATE ALZHEIMER'S TYPE DEMENTIA * donepezil 10 mg tablet Commonly known as:  ARICEPT Take 1 Tab by mouth daily. * levothyroxine 137 mcg tablet Commonly known as:  SYNTHROID Take 137 mcg by mouth Daily (before breakfast). TAKE ONE TABLET (137MCG) BY MOUTH ONCE DAILY BEFORE BREAKFAST * levothyroxine 125 mcg tablet Commonly known as:  SYNTHROID Take 125 mcg by mouth Daily (before breakfast). loratadine 10 mg tablet Commonly known as:  Marybeth Fleischer Take 1 Tab by mouth daily for 360 days. losartan-hydroCHLOROthiazide 100-25 mg per tablet Commonly known as:  HYZAAR  
TAKE ONE TABLET BY MOUTH ONCE DAILY metoprolol succinate 25 mg XL tablet Commonly known as:  TOPROL-XL  
TAKE ONE TABLET BY MOUTH ONCE DAILY MULTIVITAMIN PO Take 1 Tab by mouth daily. OMEGA 3 FISH OIL PO Take 1 Cap by mouth daily. traMADol 50 mg tablet Commonly known as:  ULTRAM  
TAKE ONE TABLET BY MOUTH EVERY 8 HOURS AS NEEDED FOR PAIN  
  
 * Notice: This list has 5 medication(s) that are the same as other medications prescribed for you. Read the directions carefully, and ask your doctor or other care provider to review them with you. Prescriptions Sent to Pharmacy Refills  
 losartan-hydroCHLOROthiazide (HYZAAR) 100-25 mg per tablet 3 Sig: TAKE ONE TABLET BY MOUTH ONCE DAILY Class: Normal  
 Pharmacy: [id number] , [address] [facility] Ph #: [phone]  
 metoprolol succinate (TOPROL-XL) 25 mg XL tablet 3 Sig: TAKE ONE TABLET BY MOUTH ONCE DAILY Class: Normal  
 Pharmacy: [address] [place] [address] Ph #: [phone] Follow-up Instructions Return if symptoms worsen or fail to improve. Introducing [facility]! Dear Linda Cross: Thank you for requesting a [employer] account. Our records indicate that you already have an active [employer] account. You can access your account anytime at [email or web address] Did you know that you can access your hospital and ER discharge instructions at any time in [employer] You can also review all of your test results from your hospital stay or ER visit. Additional Information If you have questions, please visit the Frequently Asked Questions section of the [employer] website at [email or web address] Remember, [employer] is NOT to be used for urgent needs. For medical emergencies, dial 911. Now available from your iPhone and Android! Please provide this summary of care documentation to your next provider. Your primary care clinician is listed as GITA ISLAS. If you have any questions after today's visit, please call [phone].

## 2018-05-09 LAB
ALBUMIN SERPL-MCNC: 4.1 G/DL (ref 3.5–4.8)
ALBUMIN/GLOB SERPL: 1.6 {RATIO} (ref 1.2–2.2)
ALP SERPL-CCNC: 82 IU/L (ref 39–117)
ALT SERPL-CCNC: 20 IU/L (ref 0–32)
AST SERPL-CCNC: 30 IU/L (ref 0–40)
BASOPHILS # BLD AUTO: 0.1 X10E3/UL (ref 0–0.2)
BASOPHILS NFR BLD AUTO: 2 %
BILIRUB SERPL-MCNC: 0.8 MG/DL (ref 0–1.2)
BUN SERPL-MCNC: 13 MG/DL (ref 8–27)
BUN/CREAT SERPL: 17 (ref 12–28)
CALCIUM SERPL-MCNC: 10 MG/DL (ref 8.7–10.3)
CHLORIDE SERPL-SCNC: 101 MMOL/L (ref 96–106)
CO2 SERPL-SCNC: 25 MMOL/L (ref 18–29)
CREAT SERPL-MCNC: 0.75 MG/DL (ref 0.57–1)
EOSINOPHIL # BLD AUTO: 0.1 X10E3/UL (ref 0–0.4)
EOSINOPHIL NFR BLD AUTO: 4 %
ERYTHROCYTE [DISTWIDTH] IN BLOOD BY AUTOMATED COUNT: 13.1 % (ref 12.3–15.4)
GFR SERPLBLD CREATININE-BSD FMLA CKD-EPI: 78 ML/MIN/1.73
GFR SERPLBLD CREATININE-BSD FMLA CKD-EPI: 90 ML/MIN/1.73
GLOBULIN SER CALC-MCNC: 2.5 G/DL (ref 1.5–4.5)
GLUCOSE SERPL-MCNC: 86 MG/DL (ref 65–99)
HCT VFR BLD AUTO: 40.9 % (ref 34–46.6)
HGB BLD-MCNC: 13.9 G/DL (ref 11.1–15.9)
IMM GRANULOCYTES # BLD: 0 X10E3/UL (ref 0–0.1)
IMM GRANULOCYTES NFR BLD: 0 %
LYMPHOCYTES # BLD AUTO: 1.2 X10E3/UL (ref 0.7–3.1)
LYMPHOCYTES NFR BLD AUTO: 30 %
MCH RBC QN AUTO: 30.3 PG (ref 26.6–33)
MCHC RBC AUTO-ENTMCNC: 34 G/DL (ref 31.5–35.7)
MCV RBC AUTO: 89 FL (ref 79–97)
MONOCYTES # BLD AUTO: 0.4 X10E3/UL (ref 0.1–0.9)
MONOCYTES NFR BLD AUTO: 9 %
NEUTROPHILS # BLD AUTO: 2.1 X10E3/UL (ref 1.4–7)
NEUTROPHILS NFR BLD AUTO: 55 %
PLATELET # BLD AUTO: 242 X10E3/UL (ref 150–379)
POTASSIUM SERPL-SCNC: 3.8 MMOL/L (ref 3.5–5.2)
PROT SERPL-MCNC: 6.6 G/DL (ref 6–8.5)
RBC # BLD AUTO: 4.58 X10E6/UL (ref 3.77–5.28)
SODIUM SERPL-SCNC: 144 MMOL/L (ref 134–144)
TSH SERPL DL<=0.005 MIU/L-ACNC: 9.52 UIU/ML (ref 0.45–4.5)
WBC # BLD AUTO: 3.8 X10E3/UL (ref 3.4–10.8)

## 2018-05-09 RX ORDER — LEVOTHYROXINE SODIUM 150 UG/1
150 TABLET ORAL
Qty: 90 TAB | Refills: 1 | Status: SHIPPED | OUTPATIENT
Start: 2018-05-09 | End: 2018-06-29 | Stop reason: DRUGHIGH

## 2018-05-09 NOTE — PROGRESS NOTES
Mail labs to pt and call her--thyroid rx is too low--new rx called in and check in 3months    [facility]

## 2018-05-10 NOTE — PROGRESS NOTES
Patient called and verified. Reviewed lab results and new medication sent to [facility]. Reviewed dosage change and new appointment for follow up scheduled for 8/13/2018 9:00 am. Patient verbalizes understanding.

## 2018-06-11 RX ORDER — LEVOTHYROXINE SODIUM 125 UG/1
TABLET ORAL
Qty: 90 TAB | Refills: 4 | Status: SHIPPED | OUTPATIENT
Start: 2018-06-11 | End: 2018-08-13 | Stop reason: SDUPTHER

## 2018-06-29 ENCOUNTER — OFFICE VISIT (OUTPATIENT)
Dept: NEUROLOGY | Age: 78
End: 2018-06-29

## 2018-06-29 VITALS
RESPIRATION RATE: 18 BRPM | HEART RATE: 106 BPM | OXYGEN SATURATION: 98 % | HEIGHT: 63 IN | SYSTOLIC BLOOD PRESSURE: 112 MMHG | WEIGHT: 136 LBS | BODY MASS INDEX: 24.1 KG/M2 | DIASTOLIC BLOOD PRESSURE: 66 MMHG

## 2018-06-29 DIAGNOSIS — F03.90 DEMENTIA WITHOUT BEHAVIORAL DISTURBANCE, UNSPECIFIED DEMENTIA TYPE: Primary | ICD-10-CM

## 2018-06-29 NOTE — PATIENT INSTRUCTIONS
[facility]   Statement to Patients  April 1, 2014      In an effort to ensure the large volume of patient prescription refills is processed in the most efficient and expeditious manner, we are asking our patients to assist us by calling your Pharmacy for all prescription refills, this will include also your  Mail Order Pharmacy. The pharmacy will contact our office electronically to continue the refill process. Please do not wait until the last minute to call your pharmacy. We need at least 48 hours (2days) to fill prescriptions. We also encourage you to call your pharmacy before going to  your prescription to make sure it is ready. With regard to controlled substance prescription refill requests (narcotic refills) that need to be picked up at our office, we ask your cooperation by providing us with at least 72 hours (3days) notice that you will need a refill. We will not refill narcotic prescription refill requests after 4:00pm on any weekday, Monday through Thursday, or after 2:00pm on Fridays, or on the weekends. We encourage everyone to explore another way of getting your prescription refill request processed using [employer], our patient web portal through our electronic medical record system. [employer] is an efficient and effective way to communicate your medication request directly to the office and  downloadable as an dann on your smart phone . [employer] also features a review functionality that allows you to view your medication list as well as leave messages for your physician. Are you ready to get connected? If so please review the attatched instructions or speak to any of our staff to get you set up right away! Thank you so much for your cooperation. Should you have any questions please contact our Practice Administrator.     The Physicians and Staff,  [facility]                Alzheimer's Disease: Care Instructions  Your Care Instructions    Alzheimer's disease is a type of dementia. It causes memory loss and affects judgment, language, and behavior. You may have trouble making decisions or may get lost in places that you used to know well. Alzheimer's disease is different than mild memory loss that occurs with aging. It is not clear what causes Alzheimer's disease, but it is the most common form of dementia in older adults. Finding out that you have this disease is a shock. You may be afraid and worried about how the condition will change your life. Although there is no cure at this time, medicine in some cases may slow memory loss for a while. Other medicines may be able to help you sleep or cope with depression and behavior changes. Alzheimer's disease is different for everyone. It may take many years to develop. In some cases, people can function well for a long time. In the early stage of the disease, you can do things at home to make life easier and safer. You also can keep doing your hobbies and other activities. Many people find comfort in planning now for their future needs. Follow-up care is a key part of your treatment and safety. Be sure to make and go to all appointments, and call your doctor if you are having problems. It's also a good idea to know your test results and keep a list of the medicines you take. How can you care for yourself at home? Taking care of yourself  · If your doctor gives you medicines, take them exactly as prescribed. Call your doctor if you think you are having a problem with your medicine. You will get more details on the medicines your doctor prescribes. · Eat a balanced diet. Get plenty of whole grains, fruits, and vegetables every day. If you are not hungry at mealtimes, eat snacks at midmorning and in the afternoon. Try drinks such as Boost, Ensure, or Sustacal if you are having trouble keeping your weight up. · Stay active.  Exercise such as walking may slow the decline of your mental abilities. Try to stay active mentally too. Read and work crossword puzzles if you enjoy these activities. · If you have trouble sleeping, do not nap during the day. Get regular exercise (but not within several hours of bedtime). Drink a glass of warm milk or caffeine-free herbal tea before going to bed. · Ask your doctor about support groups and other resources in your area. They can help people who have Alzheimer's disease and their families. · Be patient. You may find that a task takes you longer than it used to. · If you have not already done so, make a list of advance directives. Advance directives are instructions to your doctor and family members about what kind of care you want if you become unable to speak or express yourself. Talk to a  about making a will, if you do not already have one. Keeping schedules  · Develop a routine. You will feel less frustrated or confused if you have a clear, simple plan of what to do every day. ¨ Make lists of your medicines and when to take them. ¨ Write down appointments and other tasks in a calendar. ¨ Put sticky notes around the house to help you remember events and other things you have to do. ¨ Schedule activities and tasks for times of the day when you are best able to handle them. Staying safe  · Tell someone when you are going out and where you are going. Let the person know when you will be back. Before you go out alone, write down where you are going, how to get there, and how to get back home. Do this even if you have gone there many times before. Take someone along with you when possible. · Make your home safe. Tack down rugs, put no-slip tape in the tub, use handrails, and put safety switches on stoves and appliances. · Have a family member or other caregiver tell you whether you are driving badly. Deciding to stop driving is very hard for many people. Driving helps you feel independent.  Your state 's license bureau can do a driving test if there is any question. Plan for other means of getting around when you are no longer able to drive. · Use strong lighting, especially at night. Put night-lights in bedrooms, hallways, and bathrooms. · Lower the hot water temperature setting to 120°F or lower to avoid burns. When should you call for help? Call 911 anytime you think you may need emergency care. For example, call if:  ? · You are lost and do not know whom to call. ? · You are injured and do not know whom to call. ?Call your doctor now or seek immediate medical care if:  ? · Your symptoms suddenly get much worse. ? Watch closely for changes in your health, and be sure to contact your doctor if:  ? · You want more information about how you can take care of yourself. Where can you learn more? Go to http://[employer]GroupMe[employer].info/. Enter Y179 in the search box to learn more about \"Alzheimer's Disease: Care Instructions. \"  Current as of: May 12, 2017  Content Version: 11.4  © 6360-3349 [email or web address]. Care instructions adapted under license by [employer] (which disclaims liability or warranty for this information). If you have questions about a medical condition or this instruction, always ask your healthcare professional. [address] any warranty or liability for your use of this information. Patient history reviewed and patient examined. Will recommend continuation of Aricept and it may be worth while to get updated blood pressure and heart rate assessment with primary care in lieu of recent urgent care encounter. Stay as reasonably safely busy as possible. Suggest a revisit in 4 months.

## 2018-06-29 NOTE — PROGRESS NOTES
Neurology Consult      Subjective:      Ras Weiner is a 69 y.o. female who comes in on this visit for test results since previous encounter. EEG was normal B12 and folate normal and a brain MRI with mild white matter changes only. Had an Aricept start up over a 2 month titration. And apparently tolerated it well. Went over again the potential side effects and there was a reference to an encounter at the urgent care center. Apparently deemed to be dehydrated and some reference to her white blood count perhaps suggesting a virus? Also mention to me that she is previously been assessed as having a fast heart rate but I see no other pathologic significance in the past medical history section. If there are continued concerns about weakness or positional dizziness would recommend follow-up with primary care. Currently do not associate this with the drug Aricept. Will make a suggestion to 4 month follow-up. Says she stays busy and would recommend continued socialization so she avoids isolation phenomena. Currently lives alone but says her neighborhood is very safe etc. revisit 4 months. Current Outpatient Prescriptions   Medication Sig Dispense Refill    levothyroxine (SYNTHROID) 125 mcg tablet TAKE ONE TABLET BY MOUTH ONCE DAILY BEFORE BREAKFAST. 90 Tab 4    losartan-hydroCHLOROthiazide (HYZAAR) 100-25 mg per tablet TAKE ONE TABLET BY MOUTH ONCE DAILY 90 Tab 3    metoprolol succinate (TOPROL-XL) 25 mg XL tablet TAKE ONE TABLET BY MOUTH ONCE DAILY 90 Tab 3    donepezil (ARICEPT) 10 mg tablet Take 1 Tab by mouth daily. Indications: MILD TO MODERATE ALZHEIMER'S TYPE DEMENTIA 90 Tab 3    Cholecalciferol, Vitamin D3, 2,000 unit cap Take 4,000 Units by mouth.  OMEGA-3 FATTY ACIDS/FISH OIL (OMEGA 3 FISH OIL PO) Take 1 Cap by mouth daily.  MULTIVITAMINS (MULTIVITAMIN PO) Take 1 Tab by mouth daily.       traMADol (ULTRAM) 50 mg tablet TAKE ONE TABLET BY MOUTH EVERY 8 HOURS AS NEEDED FOR PAIN 60 Tab 5      Allergies   Allergen Reactions    Ace Inhibitors Hives and Swelling    Ambien [Zolpidem] Other (comments)     HALLUCINATIONS    Keflex [Cephalexin] Other (comments)     Pt reports having hives from head to toe so her PCP included several drugs that she was taking at the time as the cause (Keflex, Levaquin and ACE inhibitors)    Levaquin [Levofloxacin] Not Reported This Time     Past Medical History:   Diagnosis Date    Acute meniscal tear of knee     Bulging disc     DJD (degenerative joint disease)     l knee    GERD (gastroesophageal reflux disease)     HTN     Hypothyroid     OA (osteoarthritis)     Osteoporosis     Spinal stenosis       Past Surgical History:   Procedure Laterality Date    BREAST SURGERY PROCEDURE UNLISTED      1968 implants    COLONOSCOPY       HX HYSTERECTOMY  1979    HX ORTHOPAEDIC      left knee 2008    IR DILATE ESOPH STRICT      2004    NEUROLOGICAL PROCEDURE UNLISTED  2013    STRESS TEST - GXT  2005    neg      Social History     Social History    Marital status:      Spouse name: N/A    Number of children: N/A    Years of education: N/A     Occupational History    Not on file. Social History Main Topics    Smoking status: Never Smoker    Smokeless tobacco: Never Used    Alcohol use Yes      Comment: occas    Drug use: No    Sexual activity: Not on file     Other Topics Concern    Not on file     Social History Narrative      Family History   Problem Relation Age of Onset    Cancer Father     Heart Attack Brother     Stroke Brother     Coronary Artery Disease Brother       Visit Vitals    /66    Pulse (!) 106    Resp 18    Ht 5' 3\" (1.6 m)    Wt 61.7 kg (136 lb)    SpO2 98%    BMI 24.09 kg/m2        Review of Systems:   A comprehensive review of systems was negative except for that written in the HPI. Neuro Exam:     Appearance:   The patient is well developed, well nourished, provides a coherent history and is in no acute distress. Mental Status: Oriented to time, place and person. Mood and affect appropriate. Cranial Nerves:   Intact visual fields. Fundi are benign. JUAN J, EOM's full, no nystagmus, no ptosis. Facial sensation is normal. Corneal reflexes are intact. Facial movement is symmetric. Hearing is normal bilaterally. Palate is midline with normal sternocleidomastoid and trapezius muscles are normal. Tongue is midline. Motor:  5/5 strength in upper and lower proximal and distal muscles. Normal bulk and tone. No fasciculations. Reflexes:   Deep tendon reflexes 2+/4 and symmetrical.   Sensory:   Normal to touch, pinprick and vibration. Gait:  Normal gait. Tremor:   No tremor noted. Cerebellar:  No cerebellar signs present. Neurovascular:  Normal heart sounds and regular rhythm, peripheral pulses intact, and no carotid bruits. Assessment:   Dementia mild to moderate. Will suggest she continue the Aricept and it sounds like she tolerates it well. Stays reasonably and safely busy as possible both mentally and physically. Socialization is always good. Was at recent urgent care and was told blood pressure was low and may want to catch up with primary care in regards to blood pressure check as she is on blood pressure medicine. Plan:   Revisit 4 months.   Signed by :  Gracy Roy MD

## 2018-06-29 NOTE — MR AVS SNAPSHOT
[address] [facility] [address] [facility] [id number] [phone] Patient: Kumar Virk MRN:  :[id number] Visit Information Date & Time Provider Department Dept. Phone Encounter #  
 2018  1:20 PM Jose Armando Ruffin MD [facility] [phone] [id number] Follow-up Instructions Return in about 4 months (around 10/29/2018). Your Appointments 2018  9:00 AM  
Any with Bethel Birmingham MD  
5900 [facility] 365[address] [facility]) Appt Note: recheck thyroid levels. dose changed [address] [address] [address] [address] [phone]  
  
   
 [address] [address] [address] Upcoming Health Maintenance Date Due DTaP/Tdap/Td series (1 - Tdap) 10/31/1962 Influenza Age 5 to Adult 2018 GLAUCOMA SCREENING Q2Y 3/27/2019 MEDICARE YEARLY EXAM 2019 COLONOSCOPY 3/15/2026 Allergies as of 2018  Review Complete On: 2018 By: Jose Armando Ruffin MD  
  
 Severity Noted Reaction Type Reactions Ace Inhibitors  05/10/2010    Hives, Swelling Ambien [Zolpidem]  05/10/2010    Other (comments) HALLUCINATIONS Keflex [Cephalexin]  05/10/2010    Other (comments) Pt reports having hives from head to toe so her PCP included several drugs that she was taking at the time as the cause (Keflex, Levaquin and ACE inhibitors) Levaquin [Levofloxacin]  05/10/2010    Not Reported This Time Current Immunizations  Reviewed on 10/31/2017 Name Date Influenza High Dose Vaccine PF 10/31/2017 Influenza Vaccine 2014 Influenza Vaccine Whole 10/10/2011 Pneumococcal Conjugate (PCV-13) 2015 Pneumococcal Vaccine (Unspecified Type) 2010 Zoster Vaccine, Live 2013 Not reviewed this visit You Were Diagnosed With   
  
 Codes Comments Dementia without behavioral disturbance, unspecified dementia type    -  Primary ICD-10-CM: F03.90 ICD-9-CM: 294.20 Vitals BP Pulse Resp Height(growth percentile) Weight(growth percentile) SpO2  
 112/66 (!) 106 18 5' 3\" (1.6 m) 136 lb (61.7 kg) 98% BMI OB Status Smoking Status 24.09 kg/m2 Hysterectomy Never Smoker Vitals History BMI and BSA Data Body Mass Index Body Surface Area 24.09 kg/m 2 1.66 m 2 Preferred Pharmacy Pharmacy Name Phone 500 [address], [address] [place] [address] [phone] Your Updated Medication List  
  
   
This list is accurate as of 6/29/18  1:45 PM.  Always use your most recent med list.  
  
  
  
  
 Cholecalciferol (Vitamin D3) 2,000 unit Cap capsule Commonly known as:  VITAMIN D3 Take 4,000 Units by mouth. donepezil 10 mg tablet Commonly known as:  ARICEPT Take 1 Tab by mouth daily. Indications: MILD TO MODERATE ALZHEIMER'S TYPE DEMENTIA  
  
 levothyroxine 125 mcg tablet Commonly known as:  SYNTHROID  
TAKE ONE TABLET BY MOUTH ONCE DAILY BEFORE BREAKFAST. losartan-hydroCHLOROthiazide 100-25 mg per tablet Commonly known as:  HYZAAR  
TAKE ONE TABLET BY MOUTH ONCE DAILY  
  
 metoprolol succinate 25 mg XL tablet Commonly known as:  TOPROL-XL  
TAKE ONE TABLET BY MOUTH ONCE DAILY MULTIVITAMIN PO Take 1 Tab by mouth daily. OMEGA 3 FISH OIL PO Take 1 Cap by mouth daily. traMADol 50 mg tablet Commonly known as:  ULTRAM  
TAKE ONE TABLET BY MOUTH EVERY 8 HOURS AS NEEDED FOR PAIN Follow-up Instructions Return in about 4 months (around 10/29/2018). Patient Instructions PRESCRIPTION REFILL POLICY [facility] Statement to Patients April 1, 2014 In an effort to ensure the large volume of patient prescription refills is processed in the most efficient and expeditious manner, we are asking our patients to assist us by calling your Pharmacy for all prescription refills, this will include also your  Mail Order Pharmacy. The pharmacy will contact our office electronically to continue the refill process. Please do not wait until the last minute to call your pharmacy. We need at least 48 hours (2days) to fill prescriptions. We also encourage you to call your pharmacy before going to  your prescription to make sure it is ready. With regard to controlled substance prescription refill requests (narcotic refills) that need to be picked up at our office, we ask your cooperation by providing us with at least 72 hours (3days) notice that you will need a refill. We will not refill narcotic prescription refill requests after 4:00pm on any weekday, Monday through Thursday, or after 2:00pm on Fridays, or on the weekends. We encourage everyone to explore another way of getting your prescription refill request processed using [employer], our patient web portal through our electronic medical record system. [employer] is an efficient and effective way to communicate your medication request directly to the office and  downloadable as an dann on your smart phone . [employer] also features a review functionality that allows you to view your medication list as well as leave messages for your physician. Are you ready to get connected? If so please review the attatched instructions or speak to any of our staff to get you set up right away! Thank you so much for your cooperation. Should you have any questions please contact our Practice Administrator. The Physicians and Staff,  [facility] Alzheimer's Disease: Care Instructions Your Care Instructions Alzheimer's disease is a type of dementia. It causes memory loss and affects judgment, language, and behavior. You may have trouble making decisions or may get lost in places that you used to know well. Alzheimer's disease is different than mild memory loss that occurs with aging. It is not clear what causes Alzheimer's disease, but it is the most common form of dementia in older adults. Finding out that you have this disease is a shock. You may be afraid and worried about how the condition will change your life. Although there is no cure at this time, medicine in some cases may slow memory loss for a while. Other medicines may be able to help you sleep or cope with depression and behavior changes. Alzheimer's disease is different for everyone. It may take many years to develop. In some cases, people can function well for a long time. In the early stage of the disease, you can do things at home to make life easier and safer. You also can keep doing your hobbies and other activities. Many people find comfort in planning now for their future needs. Follow-up care is a key part of your treatment and safety. Be sure to make and go to all appointments, and call your doctor if you are having problems. It's also a good idea to know your test results and keep a list of the medicines you take. How can you care for yourself at home? Taking care of yourself · If your doctor gives you medicines, take them exactly as prescribed. Call your doctor if you think you are having a problem with your medicine. You will get more details on the medicines your doctor prescribes. · Eat a balanced diet. Get plenty of whole grains, fruits, and vegetables every day. If you are not hungry at mealtimes, eat snacks at midmorning and in the afternoon. Try drinks such as Boost, Ensure, or Sustacal if you are having trouble keeping your weight up. · Stay active. Exercise such as walking may slow the decline of your mental abilities. Try to stay active mentally too. Read and work crossword puzzles if you enjoy these activities. · If you have trouble sleeping, do not nap during the day.  Get regular exercise (but not within several hours of bedtime). Drink a glass of warm milk or caffeine-free herbal tea before going to bed. · Ask your doctor about support groups and other resources in your area. They can help people who have Alzheimer's disease and their families. · Be patient. You may find that a task takes you longer than it used to. · If you have not already done so, make a list of advance directives. Advance directives are instructions to your doctor and family members about what kind of care you want if you become unable to speak or express yourself. Talk to a  about making a will, if you do not already have one. Keeping schedules · Develop a routine. You will feel less frustrated or confused if you have a clear, simple plan of what to do every day. ¨ Make lists of your medicines and when to take them. ¨ Write down appointments and other tasks in a calendar. ¨ Put sticky notes around the house to help you remember events and other things you have to do. ¨ Schedule activities and tasks for times of the day when you are best able to handle them. Staying safe · Tell someone when you are going out and where you are going. Let the person know when you will be back. Before you go out alone, write down where you are going, how to get there, and how to get back home. Do this even if you have gone there many times before. Take someone along with you when possible. · Make your home safe. Tack down rugs, put no-slip tape in the tub, use handrails, and put safety switches on stoves and appliances. · Have a family member or other caregiver tell you whether you are driving badly. Deciding to stop driving is very hard for many people. Driving helps you feel independent. Your state 's license bureau can do a driving test if there is any question. Plan for other means of getting around when you are no longer able to drive. · Use strong lighting, especially at night.  Put night-lights in bedrooms, hallways, and bathrooms. · Lower the hot water temperature setting to 120°F or lower to avoid burns. When should you call for help? Call 911 anytime you think you may need emergency care. For example, call if: 
? · You are lost and do not know whom to call. ? · You are injured and do not know whom to call. ?Call your doctor now or seek immediate medical care if: 
? · Your symptoms suddenly get much worse. ? Watch closely for changes in your health, and be sure to contact your doctor if: 
? · You want more information about how you can take care of yourself. Where can you learn more? Go to http://abraham-anthony.info/. Enter Y179 in the search box to learn more about \"Alzheimer's Disease: Care Instructions. \" Current as of: May 12, 2017 Content Version: 11.4 © 8908-7177 Communication Intelligence. Care instructions adapted under license by mobifriends (which disclaims liability or warranty for this information). If you have questions about a medical condition or this instruction, always ask your healthcare professional. [address] any warranty or liability for your use of this information. Patient history reviewed and patient examined. Will recommend continuation of Aricept and it may be worth while to get updated blood pressure and heart rate assessment with primary care in lieu of recent urgent care encounter. Stay as reasonably safely busy as possible. Suggest a revisit in 4 months. Introducing [facility] & HEALTH SERVICES! Dear Avinash Neighbours: Thank you for requesting a [employer] account. Our records indicate that you already have an active [employer] account. You can access your account anytime at [email or web address] Did you know that you can access your hospital and ER discharge instructions at any time in [employer]? You can also review all of your test results from your hospital stay or ER visit. Additional Information If you have questions, please visit the Frequently Asked Questions section of the [employer] website at [email or web address] Remember, [employer] is NOT to be used for urgent needs. For medical emergencies, dial 911. Now available from your iPhone and Android! Please provide this summary of care documentation to your next provider. Your primary care clinician is listed as GITA ISLAS. If you have any questions after today's visit, please call [phone].

## 2018-07-03 ENCOUNTER — HOSPITAL ENCOUNTER (OUTPATIENT)
Dept: LAB | Age: 78
Discharge: HOME | End: 2018-07-03
Payer: MEDICARE

## 2018-07-03 ENCOUNTER — OFFICE VISIT (OUTPATIENT)
Dept: FAMILY MEDICINE CLINIC | Age: 78
End: 2018-07-03

## 2018-07-03 VITALS
TEMPERATURE: 98.2 F | HEART RATE: 87 BPM | DIASTOLIC BLOOD PRESSURE: 64 MMHG | OXYGEN SATURATION: 95 % | BODY MASS INDEX: 23.96 KG/M2 | RESPIRATION RATE: 16 BRPM | SYSTOLIC BLOOD PRESSURE: 107 MMHG | WEIGHT: 135.2 LBS | HEIGHT: 63 IN

## 2018-07-03 DIAGNOSIS — E03.9 ACQUIRED HYPOTHYROIDISM: ICD-10-CM

## 2018-07-03 DIAGNOSIS — N39.0 URINARY TRACT INFECTION WITHOUT HEMATURIA, SITE UNSPECIFIED: ICD-10-CM

## 2018-07-03 DIAGNOSIS — I10 HYPERTENSION, UNSPECIFIED TYPE: Primary | ICD-10-CM

## 2018-07-03 DIAGNOSIS — I95.9 HYPOTENSION, UNSPECIFIED HYPOTENSION TYPE: ICD-10-CM

## 2018-07-03 PROCEDURE — 80053 COMPREHEN METABOLIC PANEL: CPT

## 2018-07-03 PROCEDURE — 84443 ASSAY THYROID STIM HORMONE: CPT

## 2018-07-03 PROCEDURE — 85025 COMPLETE CBC W/AUTO DIFF WBC: CPT

## 2018-07-03 RX ORDER — CIPROFLOXACIN 500 MG/1
TABLET ORAL
COMMUNITY
Start: 2018-06-28 | End: 2018-09-26 | Stop reason: ALTCHOICE

## 2018-07-03 RX ORDER — LOSARTAN POTASSIUM AND HYDROCHLOROTHIAZIDE 12.5; 5 MG/1; MG/1
1 TABLET ORAL DAILY
Qty: 90 TAB | Refills: 1 | Status: SHIPPED | OUTPATIENT
Start: 2018-07-03 | End: 2018-11-12 | Stop reason: SDUPTHER

## 2018-07-03 NOTE — PROGRESS NOTES
Chief Complaint   Patient presents with    Hypotension     [facility] 6/28-6/29 BP: 86/50- IV given    Nausea     Tested + UTI : Cipro given    Thyroid Problem     1. Have you been to the ER, urgent care clinic since your last visit? Hospitalized since your last visit? Yes Where: [facility] 6/28-6/29 BP: 86/50- IV given    2. Have you seen or consulted any other health care providers outside of the [facility] since your last visit? Include any pap smears or colon screening. No        Chief Complaint   Patient presents with    Hypotension     [facility] 6/28-6/29 BP: 86/50- IV given    Nausea     Tested + UTI : Cipro given    Thyroid Problem     She is a 69 y.o. female who presents for evalution. Reviewed PmHx, RxHx, FmHx, SocHx, AllgHx and updated and dated in the chart.     Patient Active Problem List    Diagnosis    History of right shoulder replacement    Advanced care planning/counseling discussion     Has a LW,asked to bring in     [address] care planning     Pt has LW      Asthma    Bronchitis    Bronchospasm with bronchitis, acute    DDD (degenerative disc disease), lumbar    Tachycardia    HTN (hypertension)    Hypothyroid    OA (osteoarthritis)    GERD (gastroesophageal reflux disease)    Osteoporosis    Left knee DJD       Review of Systems - negative except as listed above in the HPI    Objective:     Vitals:    07/03/18 1154   BP: 107/64   Pulse: 87   Resp: 16   Temp: 98.2 °F (36.8 °C)   TempSrc: Oral   SpO2: 95%   Weight: 135 lb 3.2 oz (61.3 kg)   Height: 5' 3\" (1.6 m)     Physical Examination: General appearance - alert, well appearing, and in no distress  Neck - supple, no significant adenopathy  Chest - clear to auscultation, no wheezes, rales or rhonchi, symmetric air entry  Heart - normal rate, regular rhythm, normal S1, S2, no murmurs, rubs, clicks or gallops  Abdomen - soft, nontender, nondistended, no masses or organomegaly      Assessment/ Plan: Diagnoses and all orders for this visit:    1. Hypertension, unspecified type  -     losartan-hydroCHLOROthiazide (HYZAAR) 50-12.5 mg per tablet; Take 1 Tab by mouth daily. Indications: hypertension  -     METABOLIC PANEL, COMPREHENSIVE  -dec dose due to low BP    2. Hypotension, unspecified hypotension type  -     METABOLIC PANEL, COMPREHENSIVE  -as above    3. Acquired hypothyroidism  -     TSH 3RD GENERATION  -? If pt taking too much rx now, put back on .125 and check in 6 weeks    4. Urinary tract infection without hematuria, site unspecified  -     CBC WITH AUTOMATED DIFF  -see labs from [facility] scanned  -mild in Cr and CBC     Follow-up Disposition:  Return in about 6 weeks (around 8/14/2018), or if symptoms worsen or fail to improve. I have discussed the diagnosis with the patient and the intended plan as seen in the above orders. The patient understands and agrees with the plan. The patient has received an after-visit summary and questions were answered concerning future plans. Medication Side Effects and Warnings were discussed with patient  Patient Labs were reviewed and or requested:  Patient Past Records were reviewed and or requested    Omar Delvalle M.D. There are no Patient Instructions on file for this visit.

## 2018-07-03 NOTE — MR AVS SNAPSHOT
[id number] [place] [address] 
[phone] Patient: Jordyn Goldberg MRN:  :[id number] Visit Information Date & Time Provider Department Dept. Phone Encounter #  
 7/3/2018 11:20 AM Kamron Siddiqi MD [address] [place] [address] [phone] [id number] Follow-up Instructions Return in about 6 weeks (around 2018), or if symptoms worsen or fail to improve. Your Appointments 2018  9:00 AM  
Any with Kamron Siddiqi MD  
5900 [facility]) Appt Note: recheck thyroid levels. dose changed [address] [address] [facility] [address] [phone]  
  
   
 [address] [facility] [address]  
  
    
 10/29/2018  3:40 PM  
Follow Up with Laila Lamb MD  
[facility]) Appt Note: 4 month follow up [place] [address] [facility] [address] [facility] [address] [phone]  
  
   
 [place] [address] [id number] [address] Upcoming Health Maintenance Date Due DTaP/Tdap/Td series (1 - Tdap) 10/31/1962 Influenza Age 5 to Adult 2018 GLAUCOMA SCREENING Q2Y 3/27/2019 MEDICARE YEARLY EXAM 2019 COLONOSCOPY 3/15/2026 Allergies as of 7/3/2018  Review Complete On: 7/3/2018 By: Kamron Siddiqi MD  
  
 Severity Noted Reaction Type Reactions Ace Inhibitors  05/10/2010    Hives, Swelling Ambien [Zolpidem]  05/10/2010    Other (comments) HALLUCINATIONS Keflex [Cephalexin]  05/10/2010    Other (comments) Pt reports having hives from head to toe so her PCP included several drugs that she was taking at the time as the cause (Keflex, Levaquin and ACE inhibitors) Levaquin [Levofloxacin]  05/10/2010    Not Reported This Time Current Immunizations  Reviewed on 10/31/2017 Name Date Influenza High Dose Vaccine PF 10/31/2017 Influenza Vaccine 2014 Influenza Vaccine Whole 10/10/2011 Pneumococcal Conjugate (PCV-13) 8/24/2015 Pneumococcal Vaccine (Unspecified Type) 8/21/2010 Zoster Vaccine, Live 12/21/2013 Not reviewed this visit You Were Diagnosed With   
  
 Codes Comments Hypertension, unspecified type    -  Primary ICD-10-CM: I10 
ICD-9-CM: 401.9 Hypotension, unspecified hypotension type     ICD-10-CM: I95.9 ICD-9-CM: 458.9 Acquired hypothyroidism     ICD-10-CM: E03.9 ICD-9-CM: 244.9 Urinary tract infection without hematuria, site unspecified     ICD-10-CM: N39.0 ICD-9-CM: 599.0 Vitals BP Pulse Temp Resp Height(growth percentile) Weight(growth percentile) 107/64 87 98.2 °F (36.8 °C) (Oral) 16 5' 3\" (1.6 m) 135 lb 3.2 oz (61.3 kg) SpO2 BMI OB Status Smoking Status 95% 23.95 kg/m2 Hysterectomy Never Smoker Vitals History BMI and BSA Data Body Mass Index Body Surface Area  
 23.95 kg/m 2 1.65 m 2 Preferred Pharmacy Pharmacy Name Phone 500 [address], [address] [place] [address] [phone] Your Updated Medication List  
  
   
This list is accurate as of 7/3/18 12:23 PM.  Always use your most recent med list.  
  
  
  
  
 Cholecalciferol (Vitamin D3) 2,000 unit Cap capsule Commonly known as:  VITAMIN D3 Take 4,000 Units by mouth. ciprofloxacin HCl 500 mg tablet Commonly known as:  CIPRO  
  
 donepezil 10 mg tablet Commonly known as:  ARICEPT Take 1 Tab by mouth daily. Indications: MILD TO MODERATE ALZHEIMER'S TYPE DEMENTIA  
  
 levothyroxine 125 mcg tablet Commonly known as:  SYNTHROID  
TAKE ONE TABLET BY MOUTH ONCE DAILY BEFORE BREAKFAST. losartan-hydroCHLOROthiazide 50-12.5 mg per tablet Commonly known as:  HYZAAR Take 1 Tab by mouth daily. Indications: hypertension  
  
 metoprolol succinate 25 mg XL tablet Commonly known as:  TOPROL-XL  
TAKE ONE TABLET BY MOUTH ONCE DAILY  MULTIVITAMIN PO  
 Take 1 Tab by mouth daily. OMEGA 3 FISH OIL PO Take 1 Cap by mouth daily. Prescriptions Sent to Pharmacy Refills  
 losartan-hydroCHLOROthiazide (HYZAAR) 50-12.5 mg per tablet 1 Sig: Take 1 Tab by mouth daily. Indications: hypertension Class: Normal  
 Pharmacy: [address] [place] [address] Dr Choudhury #: [phone] Route: Oral  
  
We Performed the Following CBC WITH AUTOMATED DIFF [26113 CPT(R)] METABOLIC PANEL, COMPREHENSIVE [90701 CPT(R)] TSH 3RD GENERATION [06757 CPT(R)] Follow-up Instructions Return in about 6 weeks (around 8/14/2018), or if symptoms worsen or fail to improve. Introducing [facility] & [facility] SERVICES! Dear Onelia Garrison: Thank you for requesting a [employer] account. Our records indicate that you already have an active [employer] account. You can access your account anytime at [email or web address] Did you know that you can access your hospital and ER discharge instructions at any time in [employer]? You can also review all of your test results from your hospital stay or ER visit. Additional Information If you have questions, please visit the Frequently Asked Questions section of the [employer] website at [email or web address] Remember, [employer] is NOT to be used for urgent needs. For medical emergencies, dial 911. Now available from your iPhone and Android! Please provide this summary of care documentation to your next provider. Your primary care clinician is listed as GITA ISLAS. If you have any questions after today's visit, please call [phone].

## 2018-07-04 LAB
ALBUMIN SERPL-MCNC: 4 G/DL (ref 3.5–4.8)
ALBUMIN/GLOB SERPL: 1.5 {RATIO} (ref 1.2–2.2)
ALP SERPL-CCNC: 94 IU/L (ref 39–117)
ALT SERPL-CCNC: 32 IU/L (ref 0–32)
AST SERPL-CCNC: 32 IU/L (ref 0–40)
BASOPHILS # BLD AUTO: 0.1 X10E3/UL (ref 0–0.2)
BASOPHILS NFR BLD AUTO: 1 %
BILIRUB SERPL-MCNC: 0.6 MG/DL (ref 0–1.2)
BUN SERPL-MCNC: 15 MG/DL (ref 8–27)
BUN/CREAT SERPL: 22 (ref 12–28)
CALCIUM SERPL-MCNC: 9.4 MG/DL (ref 8.7–10.3)
CHLORIDE SERPL-SCNC: 99 MMOL/L (ref 96–106)
CO2 SERPL-SCNC: 26 MMOL/L (ref 20–29)
CREAT SERPL-MCNC: 0.69 MG/DL (ref 0.57–1)
EOSINOPHIL # BLD AUTO: 0.1 X10E3/UL (ref 0–0.4)
EOSINOPHIL NFR BLD AUTO: 2 %
ERYTHROCYTE [DISTWIDTH] IN BLOOD BY AUTOMATED COUNT: 12.8 % (ref 12.3–15.4)
GLOBULIN SER CALC-MCNC: 2.6 G/DL (ref 1.5–4.5)
GLUCOSE SERPL-MCNC: 91 MG/DL (ref 65–99)
HCT VFR BLD AUTO: 40.6 % (ref 34–46.6)
HGB BLD-MCNC: 13.4 G/DL (ref 11.1–15.9)
IMM GRANULOCYTES # BLD: 0 X10E3/UL (ref 0–0.1)
IMM GRANULOCYTES NFR BLD: 0 %
LYMPHOCYTES # BLD AUTO: 3.4 X10E3/UL (ref 0.7–3.1)
LYMPHOCYTES NFR BLD AUTO: 50 %
MCH RBC QN AUTO: 29.1 PG (ref 26.6–33)
MCHC RBC AUTO-ENTMCNC: 33 G/DL (ref 31.5–35.7)
MCV RBC AUTO: 88 FL (ref 79–97)
MONOCYTES # BLD AUTO: 0.4 X10E3/UL (ref 0.1–0.9)
MONOCYTES NFR BLD AUTO: 6 %
NEUTROPHILS # BLD AUTO: 2.8 X10E3/UL (ref 1.4–7)
NEUTROPHILS NFR BLD AUTO: 41 %
PLATELET # BLD AUTO: 262 X10E3/UL (ref 150–379)
POTASSIUM SERPL-SCNC: 4.4 MMOL/L (ref 3.5–5.2)
PROT SERPL-MCNC: 6.6 G/DL (ref 6–8.5)
RBC # BLD AUTO: 4.6 X10E6/UL (ref 3.77–5.28)
SODIUM SERPL-SCNC: 140 MMOL/L (ref 134–144)
TSH SERPL DL<=0.005 MIU/L-ACNC: 0.01 UIU/ML (ref 0.45–4.5)
WBC # BLD AUTO: 6.8 X10E3/UL (ref 3.4–10.8)

## 2018-08-13 ENCOUNTER — OFFICE VISIT (OUTPATIENT)
Dept: FAMILY MEDICINE CLINIC | Age: 78
End: 2018-08-13

## 2018-08-13 ENCOUNTER — HOSPITAL ENCOUNTER (OUTPATIENT)
Dept: LAB | Age: 78
Discharge: HOME | End: 2018-08-13
Payer: MEDICARE

## 2018-08-13 VITALS
WEIGHT: 141 LBS | DIASTOLIC BLOOD PRESSURE: 80 MMHG | SYSTOLIC BLOOD PRESSURE: 127 MMHG | RESPIRATION RATE: 20 BRPM | HEIGHT: 63 IN | BODY MASS INDEX: 24.98 KG/M2 | TEMPERATURE: 97.6 F | OXYGEN SATURATION: 96 % | HEART RATE: 64 BPM

## 2018-08-13 DIAGNOSIS — I10 ESSENTIAL HYPERTENSION: Primary | ICD-10-CM

## 2018-08-13 DIAGNOSIS — E03.9 ACQUIRED HYPOTHYROIDISM: ICD-10-CM

## 2018-08-13 DIAGNOSIS — F03.90 DEMENTIA WITHOUT BEHAVIORAL DISTURBANCE, UNSPECIFIED DEMENTIA TYPE: ICD-10-CM

## 2018-08-13 PROCEDURE — 84443 ASSAY THYROID STIM HORMONE: CPT

## 2018-08-13 RX ORDER — LOSARTAN POTASSIUM AND HYDROCHLOROTHIAZIDE 25; 100 MG/1; MG/1
1 TABLET ORAL
COMMUNITY
End: 2018-08-13

## 2018-08-13 RX ORDER — LEVOTHYROXINE SODIUM 125 UG/1
TABLET ORAL
Qty: 90 TAB | Refills: 4 | Status: SHIPPED | OUTPATIENT
Start: 2018-08-13 | End: 2018-08-14 | Stop reason: SDUPTHER

## 2018-08-13 NOTE — MR AVS SNAPSHOT
[address] [address] 
[phone] Patient: Floyd Aguirre MRN:  :[id number] Visit Information Date & Time Provider Department Dept. Phone Encounter #  
 2018  9:00 AM Leif Mcgraw MD [address] [facility] [phone] [id number] Follow-up Instructions Return if symptoms worsen or fail to improve. Your Appointments 10/29/2018  3:40 PM  
Follow Up with Stan Góemz MD  
[facility]) Appt Note: 4 month follow up [place] [address] [facility] 99 [address] [phone]  
  
   
 [place] [address] [address] [address] Upcoming Health Maintenance Date Due DTaP/Tdap/Td series (1 - Tdap) 10/31/1962 Influenza Age 5 to Adult 2018 GLAUCOMA SCREENING Q2Y 3/27/2019 MEDICARE YEARLY EXAM 2019 COLONOSCOPY 3/15/2026 Allergies as of 2018  Review Complete On: 2018 By: Leif Mcgraw MD  
  
 Severity Noted Reaction Type Reactions Ace Inhibitors  05/10/2010    Hives, Swelling Ambien [Zolpidem]  05/10/2010    Other (comments) HALLUCINATIONS Keflex [Cephalexin]  05/10/2010    Other (comments) Pt reports having hives from head to toe so her PCP included several drugs that she was taking at the time as the cause (Keflex, Levaquin and ACE inhibitors) Levaquin [Levofloxacin]  05/10/2010    Not Reported This Time Current Immunizations  Reviewed on 10/31/2017 Name Date Influenza High Dose Vaccine PF 10/31/2017 Influenza Vaccine 2014 Influenza Vaccine Whole 10/10/2011 Pneumococcal Conjugate (PCV-13) 2015 Pneumococcal Vaccine (Unspecified Type) 2010 Zoster Vaccine, Live 2013 Not reviewed this visit You Were Diagnosed With   
  
 Codes Comments  Essential hypertension    -  Primary ICD-10-CM: I10 
 ICD-9-CM: 401.9 Acquired hypothyroidism     ICD-10-CM: E03.9 ICD-9-CM: 244.9 Dementia without behavioral disturbance, unspecified dementia type     ICD-10-CM: F03.90 ICD-9-CM: 294.20 Vitals BP Pulse Temp Resp Height(growth percentile) Weight(growth percentile) 127/80 64 97.6 °F (36.4 °C) 20 5' 3\" (1.6 m) 141 lb (64 kg) SpO2 BMI OB Status Smoking Status 96% 24.98 kg/m2 Hysterectomy Never Smoker Vitals History BMI and BSA Data Body Mass Index Body Surface Area 24.98 kg/m 2 1.69 m 2 Preferred Pharmacy Pharmacy Name Phone 500 [address], [facility] [phone] Your Updated Medication List  
  
   
This list is accurate as of 8/13/18  9:40 AM.  Always use your most recent med list.  
  
  
  
  
 Cholecalciferol (Vitamin D3) 2,000 unit Cap capsule Commonly known as:  VITAMIN D3 Take 4,000 Units by mouth. ciprofloxacin HCl 500 mg tablet Commonly known as:  CIPRO  
  
 donepezil 10 mg tablet Commonly known as:  ARICEPT Take 1 Tab by mouth daily. Indications: MILD TO MODERATE ALZHEIMER'S TYPE DEMENTIA  
  
 levothyroxine 125 mcg tablet Commonly known as:  SYNTHROID  
TAKE ONE TABLET BY MOUTH ONCE DAILY BEFORE BREAKFAST. losartan-hydroCHLOROthiazide 50-12.5 mg per tablet Commonly known as:  HYZAAR Take 1 Tab by mouth daily. Indications: hypertension  
  
 metoprolol succinate 25 mg XL tablet Commonly known as:  TOPROL-XL  
TAKE ONE TABLET BY MOUTH ONCE DAILY MULTIVITAMIN PO Take 1 Tab by mouth daily. OMEGA 3 FISH OIL PO Take 1 Cap by mouth daily. We Performed the Following TSH 3RD GENERATION [45600 CPT(R)] Follow-up Instructions Return if symptoms worsen or fail to improve. Introducing [facility] & HEALTH SERVICES! Dear Klaudia Becerra: Thank you for requesting a [employer] account.   Our records indicate that you already have an active [employer] account. You can access your account anytime at [email or web address] Did you know that you can access your hospital and ER discharge instructions at any time in [employer]? You can also review all of your test results from your hospital stay or ER visit. Additional Information If you have questions, please visit the Frequently Asked Questions section of the [employer] website at [email or web address] Remember, [employer] is NOT to be used for urgent needs. For medical emergencies, dial 911. Now available from your iPhone and Android! Please provide this summary of care documentation to your next provider. Your primary care clinician is listed as GITA ISLAS. If you have any questions after today's visit, please call [phone].

## 2018-08-13 NOTE — PROGRESS NOTES
Patient here for thyroid labs. Dose was reduced last visit due to lab results but patient states she has not been taking it. ( she thought she was taking it but it was not on her list provided today and she does not remember )  She will check when she gets home and call me with the dosage. 1. Have you been to the ER, urgent care clinic since your last visit? Hospitalized since your last visit? No    2. Have you seen or consulted any other health care providers outside of the [facility] since your last visit? Include any pap smears or colon screening. No       Chief Complaint   Patient presents with    Labs     recheck thyroid levels. ( patient may not have been taking thyroid medication , unsure)      She is a 69 y.o. female who presents for evalution. Reviewed PmHx, RxHx, FmHx, SocHx, AllgHx and updated and dated in the chart.     Patient Active Problem List    Diagnosis    Dementia without behavioral disturbance    History of right shoulder replacement    Advanced care planning/counseling discussion     Has a LW,asked to bring in     [address] care planning     Pt has LW      Asthma    Bronchitis    Bronchospasm with bronchitis, acute    DDD (degenerative disc disease), lumbar    Tachycardia    HTN (hypertension)    Hypothyroid    OA (osteoarthritis)    GERD (gastroesophageal reflux disease)    Osteoporosis    Left knee DJD       Review of Systems - negative except as listed above in the HPI    Objective:     Vitals:    08/13/18 0911   BP: 127/80   Pulse: 64   Resp: 20   Temp: 97.6 °F (36.4 °C)   SpO2: 96%   Weight: 141 lb (64 kg)   Height: 5' 3\" (1.6 m)     Physical Examination: General appearance - alert, well appearing, and in no distress  Chest - clear to auscultation, no wheezes, rales or rhonchi, symmetric air entry  Heart - normal rate, regular rhythm, normal S1, S2, no murmurs, rubs, clicks or gallops    Assessment/ Plan:   Diagnoses and all orders for this visit: 1. Essential hypertension  -at goal  -pt to check BP rx at home    2. Acquired hypothyroidism  -     TSH 3RD GENERATION  -confusion on what dose she is taking, pt to call back    3. Dementia without behavioral disturbance, unspecified dementia type       Follow-up Disposition:  Return if symptoms worsen or fail to improve. I have discussed the diagnosis with the patient and the intended plan as seen in the above orders. The patient understands and agrees with the plan. The patient has received an after-visit summary and questions were answered concerning future plans. Medication Side Effects and Warnings were discussed with patient  Patient Labs were reviewed and or requested:  Patient Past Records were reviewed and or requested    Daisy Schaeffer M.D. There are no Patient Instructions on file for this visit.

## 2018-08-14 LAB — TSH SERPL DL<=0.005 MIU/L-ACNC: 191.4 UIU/ML (ref 0.45–4.5)

## 2018-08-14 RX ORDER — LEVOTHYROXINE SODIUM 125 UG/1
TABLET ORAL
Qty: 90 TAB | Refills: 4 | Status: SHIPPED | OUTPATIENT
Start: 2018-08-14 | End: 2018-09-27 | Stop reason: SDUPTHER

## 2018-08-14 NOTE — PROGRESS NOTES
LVM to start thyroid medication that was sent to pharmacy yesterday and recheck in 6 weeks to see if medication is working.

## 2018-09-26 ENCOUNTER — HOSPITAL ENCOUNTER (OUTPATIENT)
Dept: LAB | Age: 78
Discharge: HOME | End: 2018-09-26
Payer: MEDICARE

## 2018-09-26 ENCOUNTER — OFFICE VISIT (OUTPATIENT)
Dept: FAMILY MEDICINE CLINIC | Age: 78
End: 2018-09-26

## 2018-09-26 VITALS
HEIGHT: 63 IN | OXYGEN SATURATION: 95 % | WEIGHT: 140 LBS | RESPIRATION RATE: 20 BRPM | HEART RATE: 70 BPM | BODY MASS INDEX: 24.8 KG/M2 | SYSTOLIC BLOOD PRESSURE: 140 MMHG | TEMPERATURE: 98.4 F | DIASTOLIC BLOOD PRESSURE: 85 MMHG

## 2018-09-26 DIAGNOSIS — I10 ESSENTIAL HYPERTENSION: ICD-10-CM

## 2018-09-26 DIAGNOSIS — E03.9 ACQUIRED HYPOTHYROIDISM: Primary | ICD-10-CM

## 2018-09-26 PROCEDURE — 84443 ASSAY THYROID STIM HORMONE: CPT

## 2018-09-26 NOTE — PROGRESS NOTES
Patient here for 6 week f/u for thyroid labs. Medication adjustment last visit. 1. Have you been to the ER, urgent care clinic since your last visit? Hospitalized since your last visit? No 
 
2. Have you seen or consulted any other health care providers outside of the [address] [place] [address] since your last visit? Include any pap smears or colon screening. No  
 
 
 
Chief Complaint Patient presents with  Labs  
  started thyroid meds last visit 6 week f/u She is a 69 y.o. female who presents for evalution. Reviewed PmHx, RxHx, FmHx, SocHx, AllgHx and updated and dated in the chart. Patient Active Problem List  
 Diagnosis  Dementia without behavioral disturbance  History of right shoulder replacement  Advanced care planning/counseling discussion Has a LW,asked to bring in  Advance care planning Pt has LW 
  
 Asthma  Bronchitis  Bronchospasm with bronchitis, acute  DDD (degenerative disc disease), lumbar  Tachycardia  
 HTN (hypertension)  Hypothyroid  OA (osteoarthritis)  GERD (gastroesophageal reflux disease)  Osteoporosis  Left knee DJD Review of Systems - negative except as listed above in the HPI Objective:  
 
Vitals:  
 09/26/18 1636 BP: 140/85 Pulse: 70 Resp: 20 Temp: 98.4 °F (36.9 °C) SpO2: 95% Weight: 140 lb (63.5 kg) Height: 5' 3\" (1.6 m) Physical Examination: General appearance - alert, well appearing, and in no distress Neck - supple, no significant adenopathy Chest - clear to auscultation, no wheezes, rales or rhonchi, symmetric air entry Heart - normal rate, regular rhythm, normal S1, S2, no murmurs, rubs, clicks or gallops Assessment/ Plan:  
Diagnoses and all orders for this visit: 
 
1. Acquired hypothyroidism 
-     TSH 3RD GENERATION 2. Essential hypertension -at goal for age Follow-up Disposition: 
Return in about 6 weeks (around 11/7/2018). I have discussed the diagnosis with the patient and the intended plan as seen in the above orders. The patient understands and agrees with the plan. The patient has received an after-visit summary and questions were answered concerning future plans. Medication Side Effects and Warnings were discussed with patient Patient Labs were reviewed and or requested: 
Patient Past Records were reviewed and or requested Marisabel Sandy M.D. There are no Patient Instructions on file for this visit.

## 2018-09-26 NOTE — MR AVS SNAPSHOT
[address] [address] 
[phone] Patient: Jason Gallegos MRN:  :[id number] Visit Information Date & Time Provider Department Dept. Phone Encounter #  
 2018  9:00 AM Loida Duran MD [address] [facility] [phone] [id number] Follow-up Instructions Return in about 6 months (around 3/26/2019). Your Appointments 10/29/2018  3:40 PM  
Follow Up with Shanda Pickering MD  
[facility]) Appt Note: 4 month follow up [place] [address] [facility] 99 [address] [phone]  
  
   
 [place] [address] [address] [address] Upcoming Health Maintenance Date Due DTaP/Tdap/Td series (1 - Tdap) 10/31/1962 Shingrix Vaccine Age 50> (1 of 2) 10/31/1991 Influenza Age 5 to Adult 2019* GLAUCOMA SCREENING Q2Y 3/27/2019 MEDICARE YEARLY EXAM 2019 COLONOSCOPY 3/15/2026 *Topic was postponed. The date shown is not the original due date. Allergies as of 2018  Review Complete On: 2018 By: Loida Duran MD  
  
 Severity Noted Reaction Type Reactions Ace Inhibitors  05/10/2010    Hives, Swelling Ambien [Zolpidem]  05/10/2010    Other (comments) HALLUCINATIONS Keflex [Cephalexin]  05/10/2010    Other (comments) Pt reports having hives from head to toe so her PCP included several drugs that she was taking at the time as the cause (Keflex, Levaquin and ACE inhibitors) Levaquin [Levofloxacin]  05/10/2010    Not Reported This Time Current Immunizations  Reviewed on 10/31/2017 Name Date Influenza High Dose Vaccine PF 10/31/2017 Influenza Vaccine 2018, 2014 Influenza Vaccine Whole 10/10/2011 Pneumococcal Conjugate (PCV-13) 2015 Pneumococcal Vaccine (Unspecified Type) 2010 Zoster Vaccine, Live 2013 Not reviewed this visit You Were Diagnosed With   
  
 Codes Comments Acquired hypothyroidism    -  Primary ICD-10-CM: E03.9 ICD-9-CM: 244.9 Essential hypertension     ICD-10-CM: I10 
ICD-9-CM: 401.9 Vitals BP Pulse Temp Resp Height(growth percentile) Weight(growth percentile) 140/85 70 98.4 °F (36.9 °C) 20 5' 3\" (1.6 m) 140 lb (63.5 kg) SpO2 BMI OB Status Smoking Status 95% 24.8 kg/m2 Hysterectomy Never Smoker Vitals History BMI and BSA Data Body Mass Index Body Surface Area  
 24.8 kg/m 2 1.68 m 2 Preferred Pharmacy Pharmacy Name Phone 500 [address], [address] [facility] [phone] Your Updated Medication List  
  
   
This list is accurate as of 9/26/18  9:37 AM.  Always use your most recent med list.  
  
  
  
  
 Cholecalciferol (Vitamin D3) 2,000 unit Cap capsule Commonly known as:  VITAMIN D3 Take 4,000 Units by mouth. donepezil 10 mg tablet Commonly known as:  ARICEPT Take 1 Tab by mouth daily. Indications: MILD TO MODERATE ALZHEIMER'S TYPE DEMENTIA  
  
 levothyroxine 125 mcg tablet Commonly known as:  SYNTHROID  
TAKE ONE TABLET BY MOUTH ONCE DAILY BEFORE BREAKFAST. losartan-hydroCHLOROthiazide 50-12.5 mg per tablet Commonly known as:  HYZAAR Take 1 Tab by mouth daily. Indications: hypertension  
  
 metoprolol succinate 25 mg XL tablet Commonly known as:  TOPROL-XL  
TAKE ONE TABLET BY MOUTH ONCE DAILY MULTIVITAMIN PO Take 1 Tab by mouth daily. OMEGA 3 FISH OIL PO Take 1 Cap by mouth daily. We Performed the Following TSH 3RD GENERATION [69101 CPT(R)] Follow-up Instructions Return in about 6 months (around 3/26/2019). Introducing [facility] & HEALTH SERVICES! Dear Rosemarie Jeffrey: Thank you for requesting a [employer] account. Our records indicate that you already have an active [employer] account.   You can access your account anytime at [email or web address] Did you know that you can access your hospital and ER discharge instructions at any time in [employer]? You can also review all of your test results from your hospital stay or ER visit. Additional Information If you have questions, please visit the Frequently Asked Questions section of the [employer] website at [email or web address] Remember, [employer] is NOT to be used for urgent needs. For medical emergencies, dial 911. Now available from your iPhone and Android! Please provide this summary of care documentation to your next provider. Your primary care clinician is listed as GITA ISLAS. If you have any questions after today's visit, please call [phone].

## 2018-09-27 LAB — TSH SERPL DL<=0.005 MIU/L-ACNC: 9.84 UIU/ML (ref 0.45–4.5)

## 2018-09-27 RX ORDER — LEVOTHYROXINE SODIUM 150 UG/1
TABLET ORAL
Qty: 30 TAB | Refills: 1 | Status: SHIPPED | OUTPATIENT
Start: 2018-09-27 | End: 2018-11-12 | Stop reason: SDUPTHER

## 2018-10-17 ENCOUNTER — TELEPHONE (OUTPATIENT)
Dept: NEUROLOGY | Age: 78
End: 2018-10-17

## 2018-10-17 RX ORDER — DONEPEZIL HYDROCHLORIDE 10 MG/1
10 TABLET, FILM COATED ORAL DAILY
Qty: 15 TAB | Refills: 0 | Status: SHIPPED | OUTPATIENT
Start: 2018-10-17 | End: 2018-10-30 | Stop reason: SDUPTHER

## 2018-10-17 NOTE — TELEPHONE ENCOUNTER
Patient called asking if we could fill a short term RX for the donepezil. The patient normally gets it in 90 day increments, however she has an appt 10/29 and didn't want to have a full 90 day Rx ordered, just enough to get her to her next appt. She is unsure whether she needs to keep taking this after her next visit so she'd rather not have the full 90 days if it isn't necessary.  [phone]

## 2018-10-29 ENCOUNTER — OFFICE VISIT (OUTPATIENT)
Dept: NEUROLOGY | Age: 78
End: 2018-10-29

## 2018-10-29 VITALS
WEIGHT: 140 LBS | HEART RATE: 70 BPM | SYSTOLIC BLOOD PRESSURE: 146 MMHG | BODY MASS INDEX: 24.8 KG/M2 | OXYGEN SATURATION: 96 % | DIASTOLIC BLOOD PRESSURE: 82 MMHG

## 2018-10-29 DIAGNOSIS — F03.90 DEMENTIA WITHOUT BEHAVIORAL DISTURBANCE, UNSPECIFIED DEMENTIA TYPE: Primary | ICD-10-CM

## 2018-10-29 NOTE — PROGRESS NOTES
Mild to moderate dementia. Neurology Consult      Subjective:      Tyree Sheehan is a 69 y.o. female is on Aricept and tolerates it well. Who comes in today with mild to moderate dementia. I get the idea she likes [employer] but her daughters enrichment and her primary care doctor is in [place]. Finds ways to stay busy with her house and [place]. Did not mention any setbacks recently and driving is not been an issue. Has good things around and I think the socialization process is definitely in place. Seem to be very alert and focused today and we talked about the possibility in the near future of repeating the neuropsych testing but with her recounting her original test experience, I doubt that can happen anytime soon. We will see her back in 6 months. Had no complaints today. Current Outpatient Medications   Medication Sig Dispense Refill    donepezil (ARICEPT) 10 mg tablet Take 1 Tab by mouth daily. 15 Tab 0    levothyroxine (SYNTHROID) 150 mcg tablet TAKE ONE TABLET BY MOUTH ONCE DAILY BEFORE BREAKFAST. 30 Tab 1    losartan-hydroCHLOROthiazide (HYZAAR) 50-12.5 mg per tablet Take 1 Tab by mouth daily. Indications: hypertension (Patient taking differently: Take 1 Tab by mouth daily (with breakfast). Indications: hypertension) 90 Tab 1    metoprolol succinate (TOPROL-XL) 25 mg XL tablet TAKE ONE TABLET BY MOUTH ONCE DAILY 90 Tab 3    Cholecalciferol, Vitamin D3, 2,000 unit cap Take 4,000 Units by mouth.  OMEGA-3 FATTY ACIDS/FISH OIL (OMEGA 3 FISH OIL PO) Take 1 Cap by mouth daily.  MULTIVITAMINS (MULTIVITAMIN PO) Take 1 Tab by mouth daily.         Allergies   Allergen Reactions    Ace Inhibitors Hives and Swelling    Ambien [Zolpidem] Other (comments)     HALLUCINATIONS    Keflex [Cephalexin] Other (comments)     Pt reports having hives from head to toe so her PCP included several drugs that she was taking at the time as the cause (Keflex, Levaquin and ACE inhibitors)    Levaquin [Levofloxacin] Not Reported This Time     Past Medical History:   Diagnosis Date    Acute meniscal tear of knee     Bulging disc     DJD (degenerative joint disease)     l knee    GERD (gastroesophageal reflux disease)     HTN     Hypothyroid     OA (osteoarthritis)     Osteoporosis     Spinal stenosis       Past Surgical History:   Procedure Laterality Date    BREAST SURGERY PROCEDURE UNLISTED      1968 implants    COLONOSCOPY       HX HYSTERECTOMY  1979    HX ORTHOPAEDIC      left knee 2008    IR DILATE ESOPH STRICT      2004    NEUROLOGICAL PROCEDURE UNLISTED  2013    STRESS TEST - GXT  2005    neg      Social History     Socioeconomic History    Marital status:      Spouse name: Not on file    Number of children: Not on file    Years of education: Not on file    Highest education level: Not on file   Social Needs    Financial resource strain: Not on file    Food insecurity - worry: Not on file    Food insecurity - inability: Not on file   [employer] needs - medical: Not on file   [email or web address] needs - non-medical: Not on file   Occupational History    Not on file   Tobacco Use    Smoking status: Never Smoker    Smokeless tobacco: Never Used   Substance and Sexual Activity    Alcohol use: Yes     Comment: occas    Drug use: No    Sexual activity: Not on file   Other Topics Concern    Not on file   Social History Narrative    Not on file      Family History   Problem Relation Age of Onset    Cancer Father     Heart Attack Brother     Stroke Brother     Coronary Artery Disease Brother       Visit Vitals  /82   Pulse 70   Wt 63.5 kg (140 lb)   SpO2 96%   BMI 24.80 kg/m²        Review of Systems:   A comprehensive review of systems was negative except for that written in the HPI. Neuro Exam:     Appearance: The patient is well developed, well nourished, provides a coherent history and is in no acute distress.    Mental Status: Oriented to time, place and person. Mood and affect appropriate. Cranial Nerves:   Intact visual fields. Fundi are benign. JUAN J, EOM's full, no nystagmus, no ptosis. Facial sensation is normal. Corneal reflexes are intact. Facial movement is symmetric. Hearing is normal bilaterally. Palate is midline with normal sternocleidomastoid and trapezius muscles are normal. Tongue is midline. Motor:  5/5 strength in upper and lower proximal and distal muscles. Normal bulk and tone. No fasciculations. Reflexes:   Deep tendon reflexes 2+/4 and symmetrical.   Sensory:   Normal to touch, pinprick and vibration. Gait:  Normal gait. Tremor:   No tremor noted. Cerebellar:  No cerebellar signs present. Neurovascular:  Normal heart sounds and regular rhythm, peripheral pulses intact, and no carotid bruits. Assessment:   Mild to moderate dementia. Stay as reasonably and safely busy as possible. Conversation and socialization is always good. Minimize stress get good sleep and exercise should be in the picture. Currently her daughter in [place] is around and she does not mention any new medical or surgical problems. Will suggest revisit in 1 year and knows where to find me in the meantime. Plan:   Revisit 1 year.   Signed by :  Nickie Delgado MD

## 2018-10-29 NOTE — PATIENT INSTRUCTIONS
[facility]   Statement to Patients  April 1, 2014      In an effort to ensure the large volume of patient prescription refills is processed in the most efficient and expeditious manner, we are asking our patients to assist us by calling your Pharmacy for all prescription refills, this will include also your  Mail Order Pharmacy. The pharmacy will contact our office electronically to continue the refill process. Please do not wait until the last minute to call your pharmacy. We need at least 48 hours (2days) to fill prescriptions. We also encourage you to call your pharmacy before going to  your prescription to make sure it is ready. With regard to controlled substance prescription refill requests (narcotic refills) that need to be picked up at our office, we ask your cooperation by providing us with at least 72 hours (3days) notice that you will need a refill. We will not refill narcotic prescription refill requests after 4:00pm on any weekday, Monday through Thursday, or after 2:00pm on Fridays, or on the weekends. We encourage everyone to explore another way of getting your prescription refill request processed using [employer], our patient web portal through our electronic medical record system. [employer] is an efficient and effective way to communicate your medication request directly to the office and  downloadable as an dann on your smart phone . [employer] also features a review functionality that allows you to view your medication list as well as leave messages for your physician. Are you ready to get connected? If so please review the attatched instructions or speak to any of our staff to get you set up right away! Thank you so much for your cooperation. Should you have any questions please contact our Practice Administrator.     The Physicians and Staff,  [facility]     Patient will continue the Aricept and stay as reasonably and safely busy as possible. Socialization is always good and knows where to find me if there are any questions or concerns in the meantime.

## 2018-10-30 ENCOUNTER — TELEPHONE (OUTPATIENT)
Dept: NEUROLOGY | Age: 78
End: 2018-10-30

## 2018-10-30 RX ORDER — DONEPEZIL HYDROCHLORIDE 10 MG/1
10 TABLET, FILM COATED ORAL DAILY
Qty: 90 TAB | Refills: 3 | Status: SHIPPED | OUTPATIENT
Start: 2018-10-30 | End: 2019-10-29 | Stop reason: SDUPTHER

## 2018-10-30 NOTE — TELEPHONE ENCOUNTER
----- Message from Mariangel Aponte sent at 10/30/2018 12:57 PM EDT -----  Regarding: Dr. Jennifer Fierro  Pt requested a refill for \"Donepezil HCL 10 mg\" (has 3 pills left), and uses the [facility] on file. Pt best contact [phone].

## 2018-11-07 ENCOUNTER — OFFICE VISIT (OUTPATIENT)
Dept: FAMILY MEDICINE CLINIC | Age: 78
End: 2018-11-07

## 2018-11-07 ENCOUNTER — HOSPITAL ENCOUNTER (OUTPATIENT)
Dept: LAB | Age: 78
Discharge: HOME | End: 2018-11-07
Payer: MEDICARE

## 2018-11-07 VITALS
HEART RATE: 71 BPM | WEIGHT: 139.4 LBS | OXYGEN SATURATION: 96 % | TEMPERATURE: 98.2 F | RESPIRATION RATE: 15 BRPM | DIASTOLIC BLOOD PRESSURE: 83 MMHG | HEIGHT: 63 IN | SYSTOLIC BLOOD PRESSURE: 149 MMHG | BODY MASS INDEX: 24.7 KG/M2

## 2018-11-07 DIAGNOSIS — I10 ESSENTIAL HYPERTENSION: ICD-10-CM

## 2018-11-07 DIAGNOSIS — E03.9 ACQUIRED HYPOTHYROIDISM: Primary | ICD-10-CM

## 2018-11-07 PROCEDURE — 84443 ASSAY THYROID STIM HORMONE: CPT

## 2018-11-07 NOTE — PROGRESS NOTES
Chief Complaint   Patient presents with    Hypertension    Labs     Fasting today     1. Have you been to the ER, urgent care clinic since your last visit? Hospitalized since your last visit? No    2. Have you seen or consulted any other health care providers outside of the [facility] since your last visit? Include any pap smears or colon screening. No      Chief Complaint   Patient presents with    Hypertension    Labs     Fasting today     She is a 69 y.o. female who presents for evalution. Reviewed PmHx, RxHx, FmHx, SocHx, AllgHx and updated and dated in the chart. Patient Active Problem List    Diagnosis    Dementia without behavioral disturbance    History of right shoulder replacement    Advanced care planning/counseling discussion     Has a LW,asked to bring in     [id number] care planning     Pt has LW      Asthma    Bronchitis    Bronchospasm with bronchitis, acute    DDD (degenerative disc disease), lumbar    Tachycardia    HTN (hypertension)    Hypothyroid    OA (osteoarthritis)    GERD (gastroesophageal reflux disease)    Osteoporosis    Left knee DJD       Review of Systems - negative except as listed above in the HPI    Objective:     Vitals:    11/07/18 0855   BP: 149/83   Pulse: 71   Resp: 15   Temp: 98.2 °F (36.8 °C)   TempSrc: Oral   SpO2: 96%   Weight: 139 lb 6.4 oz (63.2 kg)   Height: 5' 3\" (1.6 m)     Physical Examination: General appearance - alert, well appearing, and in no distress  Chest - clear to auscultation, no wheezes, rales or rhonchi, symmetric air entry  Heart - normal rate, regular rhythm, normal S1, S2, no murmurs, rubs, clicks or gallops  Abdomen - soft, nontender, nondistended, no masses or organomegaly    Assessment/ Plan:   Diagnoses and all orders for this visit:    1. Acquired hypothyroidism  -     TSH 3RD GENERATION  -taking rx    2.  Essential hypertension  -sl inc  -check at home       Follow-up Disposition:  Return in about 3 months (around 2/7/2019) for tsh check. I have discussed the diagnosis with the patient and the intended plan as seen in the above orders. The patient understands and agrees with the plan. The patient has received an after-visit summary and questions were answered concerning future plans. Medication Side Effects and Warnings were discussed with patient  Patient Labs were reviewed and or requested:  Patient Past Records were reviewed and or requested    Yves Boyce M.D. There are no Patient Instructions on file for this visit.

## 2018-11-08 DIAGNOSIS — I10 ESSENTIAL HYPERTENSION: ICD-10-CM

## 2018-11-08 DIAGNOSIS — I10 HYPERTENSION, UNSPECIFIED TYPE: ICD-10-CM

## 2018-11-08 LAB — TSH SERPL DL<=0.005 MIU/L-ACNC: 14.65 UIU/ML (ref 0.45–4.5)

## 2018-11-08 NOTE — PROGRESS NOTES
HAMIDA with fortino that I wanted to review lab results with patient and could he tell her to call office back with dose of thyroid medication and then we can review lab results and recommendations.

## 2018-11-08 NOTE — PROGRESS NOTES
TSH is off, call pt to find out exact dose for pt so we can change and check in 6 weeks.     Priscilla Thorpe

## 2018-11-12 RX ORDER — METOPROLOL SUCCINATE 25 MG/1
TABLET, EXTENDED RELEASE ORAL
Qty: 90 TAB | Refills: 3 | Status: SHIPPED | OUTPATIENT
Start: 2018-11-12 | End: 2019-10-28 | Stop reason: SDUPTHER

## 2018-11-12 RX ORDER — LEVOTHYROXINE SODIUM 150 UG/1
TABLET ORAL
Qty: 30 TAB | Refills: 1 | Status: SHIPPED | OUTPATIENT
Start: 2018-11-12 | End: 2019-01-07 | Stop reason: SDUPTHER

## 2018-11-12 RX ORDER — LOSARTAN POTASSIUM AND HYDROCHLOROTHIAZIDE 12.5; 5 MG/1; MG/1
0.5 TABLET ORAL DAILY
Qty: 45 TAB | Refills: 1 | Status: SHIPPED | OUTPATIENT
Start: 2018-11-12 | End: 2019-05-07 | Stop reason: SDUPTHER

## 2018-11-12 NOTE — TELEPHONE ENCOUNTER
Called and spoke to patient. Elizabeth Cochran)  Pt states she is still taking  Levothyroxine 125 mcg. Patient unaware of current order (9/27) Levothyroxine 150 mcg not started. Refills for Losartan (states now taking 1/2 tab) and Metoprolol pended to Dr Gilma Judge. Please advise her of Thyroid dose needed.  ([phone])

## 2018-11-12 NOTE — TELEPHONE ENCOUNTER
Called and spoke to patient. Honey Lewis)  Pt states she is still taking  Levothyroxine 125 mcg. Patient unaware of current order (9/27) Levothyroxine 150 mcg not started. Refills for Losartan (states now taking 1/2 tab) and Metoprolol pended to Dr Scott Kwan. Please advise her of Thyroid dose needed.  ([phone])

## 2019-01-07 RX ORDER — LEVOTHYROXINE SODIUM 150 UG/1
TABLET ORAL
Qty: 30 TAB | Refills: 1 | Status: SHIPPED | OUTPATIENT
Start: 2019-01-07 | End: 2019-06-05 | Stop reason: SDUPTHER

## 2019-02-11 ENCOUNTER — HOSPITAL ENCOUNTER (OUTPATIENT)
Dept: LAB | Age: 79
Discharge: HOME | End: 2019-02-11
Payer: MEDICARE

## 2019-02-11 ENCOUNTER — OFFICE VISIT (OUTPATIENT)
Dept: FAMILY MEDICINE CLINIC | Age: 79
End: 2019-02-11

## 2019-02-11 VITALS
HEART RATE: 69 BPM | BODY MASS INDEX: 25.52 KG/M2 | SYSTOLIC BLOOD PRESSURE: 156 MMHG | RESPIRATION RATE: 16 BRPM | WEIGHT: 144 LBS | DIASTOLIC BLOOD PRESSURE: 84 MMHG | OXYGEN SATURATION: 99 % | TEMPERATURE: 98.4 F | HEIGHT: 63 IN

## 2019-02-11 DIAGNOSIS — I10 ESSENTIAL HYPERTENSION: ICD-10-CM

## 2019-02-11 DIAGNOSIS — E03.9 ACQUIRED HYPOTHYROIDISM: Primary | ICD-10-CM

## 2019-02-11 PROCEDURE — 84443 ASSAY THYROID STIM HORMONE: CPT

## 2019-02-11 NOTE — PROGRESS NOTES
Chief Complaint   Patient presents with    Hypertension    Dementia     1. Have you been to the ER, urgent care clinic since your last visit? Hospitalized since your last visit? No    2. Have you seen or consulted any other health care providers outside of the [address] [place] [address] since your last visit? Include any pap smears or colon screening. No      Chief Complaint   Patient presents with    Hypertension    Dementia     She is a 69 y.o. female who presents for evalution. Reviewed PmHx, RxHx, FmHx, SocHx, AllgHx and updated and dated in the chart. Patient Active Problem List    Diagnosis    Dementia without behavioral disturbance    History of right shoulder replacement    Advanced care planning/counseling discussion     Has a LW,asked to bring in     [address] care planning     Pt has LW      Asthma    Bronchitis    Bronchospasm with bronchitis, acute    DDD (degenerative disc disease), lumbar    Tachycardia    HTN (hypertension)    Hypothyroid    OA (osteoarthritis)    GERD (gastroesophageal reflux disease)    Osteoporosis    Left knee DJD       Review of Systems - negative except as listed above in the HPI    Objective:     Vitals:    02/11/19 0938   BP: 156/84   Pulse: 69   Resp: 16   Temp: 98.4 °F (36.9 °C)   TempSrc: Oral   SpO2: 99%   Weight: 144 lb (65.3 kg)   Height: 5' 3\" (1.6 m)     Physical Examination: General appearance - alert, well appearing, and in no distress  Neck - supple, no significant adenopathy  Chest - clear to auscultation, no wheezes, rales or rhonchi, symmetric air entry  Heart - normal rate, regular rhythm, normal S1, S2, no murmurs, rubs, clicks or gallops    Assessment/ Plan:   Diagnoses and all orders for this visit:    1. Acquired hypothyroidism  -     TSH 3RD GENERATION  -taking . 150 daily    2. Essential hypertension  -sl inc   -better at home       Follow-up Disposition:  Return in about 3 months (around 5/11/2019).     I have discussed the diagnosis with the patient and the intended plan as seen in the above orders. The patient understands and agrees with the plan. The patient has received an after-visit summary and questions were answered concerning future plans. Medication Side Effects and Warnings were discussed with patient  Patient Labs were reviewed and or requested:  Patient Past Records were reviewed and or requested    Janneth Zimmer M.D. There are no Patient Instructions on file for this visit.

## 2019-02-12 LAB — TSH SERPL DL<=0.005 MIU/L-ACNC: 0.79 UIU/ML (ref 0.45–4.5)

## 2019-05-07 DIAGNOSIS — I10 HYPERTENSION, UNSPECIFIED TYPE: ICD-10-CM

## 2019-05-07 RX ORDER — LOSARTAN POTASSIUM AND HYDROCHLOROTHIAZIDE 12.5; 5 MG/1; MG/1
TABLET ORAL
Qty: 45 TAB | Refills: 1 | Status: SHIPPED | OUTPATIENT
Start: 2019-05-07 | End: 2019-05-13 | Stop reason: SDUPTHER

## 2019-05-13 ENCOUNTER — TELEPHONE (OUTPATIENT)
Dept: FAMILY MEDICINE CLINIC | Age: 79
End: 2019-05-13

## 2019-05-13 ENCOUNTER — HOSPITAL ENCOUNTER (OUTPATIENT)
Dept: LAB | Age: 79
Discharge: HOME | End: 2019-05-13
Payer: MEDICARE

## 2019-05-13 ENCOUNTER — OFFICE VISIT (OUTPATIENT)
Dept: FAMILY MEDICINE CLINIC | Age: 79
End: 2019-05-13

## 2019-05-13 VITALS
OXYGEN SATURATION: 97 % | RESPIRATION RATE: 16 BRPM | HEIGHT: 63 IN | WEIGHT: 146 LBS | DIASTOLIC BLOOD PRESSURE: 82 MMHG | TEMPERATURE: 98.1 F | SYSTOLIC BLOOD PRESSURE: 160 MMHG | BODY MASS INDEX: 25.87 KG/M2 | HEART RATE: 71 BPM

## 2019-05-13 DIAGNOSIS — Z00.00 ROUTINE GENERAL MEDICAL EXAMINATION AT A HEALTH CARE FACILITY: Primary | ICD-10-CM

## 2019-05-13 DIAGNOSIS — E03.9 ACQUIRED HYPOTHYROIDISM: ICD-10-CM

## 2019-05-13 DIAGNOSIS — I10 HYPERTENSION, UNSPECIFIED TYPE: ICD-10-CM

## 2019-05-13 DIAGNOSIS — F03.90 DEMENTIA WITHOUT BEHAVIORAL DISTURBANCE, UNSPECIFIED DEMENTIA TYPE: ICD-10-CM

## 2019-05-13 PROCEDURE — 84443 ASSAY THYROID STIM HORMONE: CPT

## 2019-05-13 RX ORDER — LOSARTAN POTASSIUM AND HYDROCHLOROTHIAZIDE 12.5; 5 MG/1; MG/1
TABLET ORAL
Qty: 45 TAB | Refills: 3 | Status: SHIPPED | OUTPATIENT
Start: 2019-05-13 | End: 2019-05-13

## 2019-05-13 RX ORDER — TELMISARTAN AND HYDROCHLORTHIAZIDE 40; 12.5 MG/1; MG/1
1 TABLET ORAL DAILY
Qty: 90 TAB | Refills: 1 | Status: SHIPPED | OUTPATIENT
Start: 2019-05-13 | End: 2019-10-21 | Stop reason: SDUPTHER

## 2019-05-13 NOTE — TELEPHONE ENCOUNTER
Patient at pharmacy, losartan -hctz has been unavailable per pharmacy. Could patient get anything else or can she just not take anything until next appointment.   She will work on diet, stress etc.    Please call her at [phone]

## 2019-05-13 NOTE — PROGRESS NOTES
Chief Complaint Patient presents with  Hypertension Ran out of Losartan HCTZ  Thyroid Problem  Labs Fasting today  Medication Refill 1. Have you been to the ER, urgent care clinic since your last visit? Hospitalized since your last visit? No 
 
2. Have you seen or consulted any other health care providers outside of the [address] [place] [address] since your last visit? Include any pap smears or colon screening. No 
 
Medicare Wellness Exam: Chief Complaint Patient presents with  Hypertension Ran out of Losartan HCTZ  Thyroid Problem  Labs Fasting today  Medication Refill  
 
she is a 69y.o. year old female who presents for evaluation for their Medicare Wellness Visit. Fall Screen is completed and assessed=yes Depression Screen is completed and assessed=yes Medication list reviewed and adjusted for accuracy=yes Immunizations reviewed and updated=yes Health/Preventative Screenings reviewed and updated=yes ADL Functions reviewed=yes Patient Active Problem List  
 Diagnosis  Dementia without behavioral disturbance  History of right shoulder replacement  Advanced care planning/counseling discussion Has a LW,asked to bring in  Advance care planning Pt has LW 
  
 Asthma  Bronchitis  Bronchospasm with bronchitis, acute  DDD (degenerative disc disease), lumbar  Tachycardia  
 HTN (hypertension)  Hypothyroid  OA (osteoarthritis)  GERD (gastroesophageal reflux disease)  Osteoporosis  Left knee DJD Reviewed PmHx, RxHx, FmHx, SocHx, AllgHx and updated and dated in the chart. Review of Systems - negative except as listed above in the HPI Objective:  
 
Vitals:  
 05/13/19 0846 BP: 160/82 Pulse: 71 Resp: 16 Temp: 98.1 °F (36.7 °C) TempSrc: Oral  
SpO2: 97% Weight: 146 lb (66.2 kg) Height: 5' 3\" (1.6 m) Physical Examination: General appearance - alert, well appearing, and in no distress Eyes - pupils equal and reactive, extraocular eye movements intact Ears - bilateral TM's and external ear canals normal 
Nose - normal and patent, no erythema, discharge or polyps Neck - supple, no significant adenopathy Chest - clear to auscultation, no wheezes, rales or rhonchi, symmetric air entry Heart - normal rate, regular rhythm, normal S1, S2, no murmurs, rubs, clicks or gallops Abdomen - soft, nontender, nondistended, no masses or organomegaly Extremities - peripheral pulses normal, no pedal edema, no clubbing or cyanosis Assessment/ Plan:  
Diagnoses and all orders for this visit: 1. Routine general medical examination at a health care facility 
-see below 
-unsure on LW 
 
2. Hypertension, unspecified type 
-     losartan-hydroCHLOROthiazide (HYZAAR) 50-12.5 mg per tablet; TAKE 1/2 (ONE-HALF) TABLET BY MOUTH ONCE DAILY 
-off rx 
-inc bp 3. Acquired hypothyroidism 
-     TSH 3RD GENERATION 4. Dementia without behavioral disturbance, unspecified dementia type 
-moderate and stable 
 
  
 
-Pain evaluation performed in office 
-Cognitive Screen performed in office 
-Depression Screen, Fall risks (by up and go test)  and ADL functionality were addressed 
-Medication list updated and reviewed for any changes  
-A comprehensive review of medical issues and a plan was formulated 
-End of life planning was addressed with pt  
-Health Screenings for preventions were addressed and a plan was formulated 
-Shingles Vaccine was recommended 
-Discussed with patient cancer risk factors and appropriate screenings for age 
-Patient evaluated for colonoscopy and referred if needed per screeing criteria 
-Labs from previous visits were discussed with patient  
-Discussed with patient diet and exercise and formulated a plan as needed 
-An Advanced care plan was developed with the patient. 
-Alcohol screening performed and was negative - Follow-up and Dispositions · Return in about 6 months (around 11/13/2019). I have discussed the diagnosis with the patient and the intended plan as seen in the above orders. The patient understands and agrees with the plan. The patient has received an after-visit summary and questions were answered concerning future plans. Medication Side Effects and Warnings were discussed with patien Patient Labs were reviewed and or requested Patient Past Records were reviewed and or requested There are no Patient Instructions on file for this visit.  
 
 
Bedelia Schaumann, M.D.

## 2019-05-13 NOTE — TELEPHONE ENCOUNTER
----- Message from [employer] sent at 5/13/2019  3:18 PM EDT -----  Regarding: / Telephone  Contact: [phone]  Pt would like to let the office know that her insurance will not cover the new medication she was prescribed but that the pharmacy has informed her that they now have the old medication she used to take therefore she is switching back. Best contact number is [phone].

## 2019-05-13 NOTE — TELEPHONE ENCOUNTER
Patient notified of new rx called in to pharmacy for bp. Reviewed dose and directions of use. Patient verbalizes understanding.

## 2019-05-14 LAB — TSH SERPL DL<=0.005 MIU/L-ACNC: 1.19 UIU/ML (ref 0.45–4.5)

## 2019-06-05 RX ORDER — LEVOTHYROXINE SODIUM 150 UG/1
TABLET ORAL
Qty: 30 TAB | Refills: 1 | Status: SHIPPED | OUTPATIENT
Start: 2019-06-05 | End: 2021-05-04 | Stop reason: SDUPTHER

## 2019-10-21 RX ORDER — TELMISARTAN AND HYDROCHLORTHIAZIDE 40; 12.5 MG/1; MG/1
1 TABLET ORAL DAILY
Qty: 90 TAB | Refills: 1 | Status: SHIPPED | OUTPATIENT
Start: 2019-10-21 | End: 2020-02-24

## 2019-10-28 DIAGNOSIS — I10 ESSENTIAL HYPERTENSION: ICD-10-CM

## 2019-10-28 RX ORDER — METOPROLOL SUCCINATE 25 MG/1
TABLET, EXTENDED RELEASE ORAL
Qty: 90 TAB | Refills: 3 | Status: SHIPPED | OUTPATIENT
Start: 2019-10-28 | End: 2020-11-06 | Stop reason: SDUPTHER

## 2019-10-29 ENCOUNTER — OFFICE VISIT (OUTPATIENT)
Dept: NEUROLOGY | Age: 79
End: 2019-10-29

## 2019-10-29 VITALS
SYSTOLIC BLOOD PRESSURE: 124 MMHG | DIASTOLIC BLOOD PRESSURE: 70 MMHG | OXYGEN SATURATION: 97 % | RESPIRATION RATE: 16 BRPM | WEIGHT: 149.3 LBS | BODY MASS INDEX: 26.45 KG/M2 | HEART RATE: 81 BPM | HEIGHT: 63 IN

## 2019-10-29 DIAGNOSIS — F03.90 DEMENTIA WITHOUT BEHAVIORAL DISTURBANCE, UNSPECIFIED DEMENTIA TYPE: Primary | ICD-10-CM

## 2019-10-29 RX ORDER — DONEPEZIL HYDROCHLORIDE 10 MG/1
10 TABLET, FILM COATED ORAL DAILY
Qty: 90 TAB | Refills: 3 | Status: SHIPPED | OUTPATIENT
Start: 2019-10-29 | End: 2021-03-17 | Stop reason: SDUPTHER

## 2019-10-29 NOTE — PROGRESS NOTES
Neurology Consult      Subjective:      Amanda Doyle is a 69 y.o. female who comes in today with mild to moderate dementia. Is on donepezil 10 mg daily and was last seen in October 2018. Currently lives with her significant other out in [place] and is totally immersed in the wild with animals and privacy and such. Showed me pictures taken by her phone of the solar Eclipse and a random picture of a herd of deer that were in very close proximity to her house. Has 2 daughters I believe one in [place] and one in [place] that she keeps up with. We went over the importance of general orientation but I get the distinct idea she would rather default to her phone on any and all details to the date and similar approximations. Does not reference any new medical or surgical history and I suggested a 6-month revisit but she wanted to go 12 months. I strongly advised, that if there is any sudden trending or concerns would not wait 12 months to see me, but keep me advised and better yet see me sooner. Did not hear or see anything new today and will suggest she return in 12 months. If I do not hear from her, I will have to assume she is doing well. Current Outpatient Medications   Medication Sig Dispense Refill    donepezil (ARICEPT) 10 mg tablet Take 1 Tab by mouth daily. 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 90 Tab 1    levothyroxine (SYNTHROID) 150 mcg tablet TAKE 1 TABLET BY MOUTH ONCE DAILY BEFORE BREAKFAST 90 Tab 1    levothyroxine (SYNTHROID) 150 mcg tablet TAKE 1 TABLET BY MOUTH ONCE DAILY BEFORE BREAKFAST 30 Tab 1    Cholecalciferol, Vitamin D3, 2,000 unit cap Take 4,000 Units by mouth.  OMEGA-3 FATTY ACIDS/FISH OIL (OMEGA 3 FISH OIL PO) Take 1 Cap by mouth daily.       MULTIVITAMINS (MULTIVITAMIN PO) Take 1 Tab by mouth daily.         Allergies   Allergen Reactions    Ace Inhibitors Hives and Swelling    Ambien [Zolpidem] Other (comments)     HALLUCINATIONS    Keflex [Cephalexin] Other (comments)     Pt reports having hives from head to toe so her PCP included several drugs that she was taking at the time as the cause (Keflex, Levaquin and ACE inhibitors)    Levaquin [Levofloxacin] Not Reported This Time     Past Medical History:   Diagnosis Date    Acute meniscal tear of knee     Bulging disc     DJD (degenerative joint disease)     l knee    GERD (gastroesophageal reflux disease)     HTN     Hypothyroid     OA (osteoarthritis)     Osteoporosis     Spinal stenosis       Past Surgical History:   Procedure Laterality Date    BREAST SURGERY PROCEDURE UNLISTED      1968 implants    COLONOSCOPY       HX HYSTERECTOMY  1979    HX ORTHOPAEDIC      left knee 2008    IR DILATE ESOPH STRICT      2004    NEUROLOGICAL PROCEDURE UNLISTED  2013    STRESS TEST - GXT  2005    neg      Social History     Socioeconomic History    Marital status:      Spouse name: Not on file    Number of children: Not on file    Years of education: Not on file    Highest education level: Not on file   Occupational History    Not on file   Social Needs    Financial resource strain: Not on file    Food insecurity:     Worry: Not on file     Inability: Not on file    Transportation needs:     Medical: Not on file     Non-medical: Not on file   Tobacco Use    Smoking status: Never Smoker    Smokeless tobacco: Never Used   Substance and Sexual Activity    Alcohol use: Yes     Comment: occas    Drug use: No    Sexual activity: Not on file   Lifestyle    Physical activity:     Days per week: Not on file     Minutes per session: Not on file    Stress: Not on file   Relationships    Social connections:     Talks on phone: Not on file     Gets together: Not on file     Attends Gnosticist service: Not on file     Active member of club or organization: Not on file     Attends meetings of clubs or organizations: Not on file     Relationship status: Not on file    Intimate partner violence:     Fear of current or ex partner: Not on file     Emotionally abused: Not on file     Physically abused: Not on file     Forced sexual activity: Not on file   Other Topics Concern    Not on file   Social History Narrative    Not on file      Family History   Problem Relation Age of Onset    Cancer Father     Heart Attack Brother     Stroke Brother     Coronary Artery Disease Brother       Visit Vitals  /70   Pulse 81   Resp 16   Ht 5' 3\" (1.6 m)   Wt 67.7 kg (149 lb 4.8 oz)   SpO2 97%   BMI 26.45 kg/m²        Review of Systems:   A comprehensive review of systems was negative except for that written in the HPI. Neuro Exam:     Appearance: The patient is well developed, well nourished, provides a coherent history and is in no acute distress. Mental Status: Oriented to day and close to date as it is close to her birthday([birth year]) and knew the month but had difficulty with the year. Mood and affect appropriate. Cranial Nerves:   Intact visual fields. Fundi are benign. JUAN J, EOM's full, no nystagmus, no ptosis. Facial sensation is normal. Corneal reflexes are intact. Facial movement is symmetric. Hearing is normal bilaterally. Palate is midline with normal sternocleidomastoid and trapezius muscles are normal. Tongue is midline. Motor:  5/5 strength in upper and lower proximal and distal muscles. Normal bulk and tone. No fasciculations. Reflexes:   Deep tendon reflexes 2+/4 and symmetrical.   Sensory:   Normal to touch, pinprick and vibration. Gait:  Normal gait. Tremor:   No tremor noted. Cerebellar:  No cerebellar signs present. Neurovascular:  Normal heart sounds and regular rhythm, peripheral pulses intact, and no carotid bruits.             Assessment:   Mild to moderate dementia. Will renew donepezil by request and see the patient back in 1 year. Was asked to see me sooner if there is any sudden trending of memory performance and similar issues unfolding. Continue to stay as reasonably busy as possible and sounds like she has very important contact with both daughters and socialization is always good. Did not reference any new medical or surgical history at this time. Plan:   Revisit 1 year.   Signed by :  Madyson Urena MD

## 2019-10-29 NOTE — LETTER
10/29/19 Patient: Chuck Dickinson YOB: 1940 Date of Visit: 10/29/2019 Mariela Carlos MD 
[address]  [address] [address] VIA In Basket Dear Mariela Carlos MD, Thank you for referring Ms. Cher Franco to [facility] for evaluation. My notes for this consultation are attached. If you have questions, please do not hesitate to call me. I look forward to following your patient along with you.  
 
 
Sincerely, 
 
Je Garcia MD

## 2019-10-29 NOTE — PATIENT INSTRUCTIONS
[facility]   Statement to Patients  April 1, 2014      In an effort to ensure the large volume of patient prescription refills is processed in the most efficient and expeditious manner, we are asking our patients to assist us by calling your Pharmacy for all prescription refills, this will include also your  Mail Order Pharmacy. The pharmacy will contact our office electronically to continue the refill process. Please do not wait until the last minute to call your pharmacy. We need at least 48 hours (2days) to fill prescriptions. We also encourage you to call your pharmacy before going to  your prescription to make sure it is ready. With regard to controlled substance prescription refill requests (narcotic refills) that need to be picked up at our office, we ask your cooperation by providing us with at least 72 hours (3days) notice that you will need a refill. We will not refill narcotic prescription refill requests after 4:00pm on any weekday, Monday through Thursday, or after 2:00pm on Fridays, or on the weekends. We encourage everyone to explore another way of getting your prescription refill request processed using [employer], our patient web portal through our electronic medical record system. [employer] is an efficient and effective way to communicate your medication request directly to the office and  downloadable as an dann on your smart phone . [employer] also features a review functionality that allows you to view your medication list as well as leave messages for your physician. Are you ready to get connected? If so please review the attatched instructions or speak to any of our staff to get you set up right away! Thank you so much for your cooperation. Should you have any questions please contact our Practice Administrator.     The Physicians and Staff,  [facility] [address]  What is a living will?    A living will is a legal form you use to write down the kind of care you want at the end of your life. It is used by the health professionals who will treat you if you aren't able to decide for yourself. If you put your wishes in writing, your loved ones and others will know what kind of care you want. They won't need to guess. This can ease your mind and be helpful to others. A living will is not the same as an estate or property will. An estate will explains what you want to happen with your money and property after you die. Is a living will a legal document? A living will is a legal document. Each state has its own laws about living morris. If you move to another state, make sure that your living will is legal in the state where you now live. Or you might use a universal form that has been approved by many states. This kind of form can sometimes be completed and stored online. Your electronic copy will then be available wherever you have a connection to the Internet. In most cases, doctors will respect your wishes even if you have a form from a different state. · You don't need an  to complete a living will. But legal advice can be helpful if your state's laws are unclear, your health history is complicated, or your family can't agree on what should be in your living will. · You can change your living will at any time. Some people find that their wishes about end-of-life care change as their health changes. · In addition to making a living will, think about completing a medical power of  form. This form lets you name the person you want to make end-of-life treatment decisions for you (your \"health care agent\") if you're not able to. Many hospitals and nursing homes will give you the forms you need to complete a living will and a medical power of . · Your living will is used only if you can't make or communicate decisions for yourself anymore.  If you become able to make decisions again, you can accept or refuse any treatment, no matter what you wrote in your living will. · Your state may offer an online registry. This is a place where you can store your living will online so the doctors and nurses who need to treat you can find it right away. What should you think about when creating a living will? Talk about your end-of-life wishes with your family members and your doctor. Let them know what you want. That way the people making decisions for you won't be surprised by your choices. Think about these questions as you make your living will:  · Do you know enough about life support methods that might be used? If not, talk to your doctor so you know what might be done if you can't breathe on your own, your heart stops, or you're unable to swallow. · What things would you still want to be able to do after you receive life-support methods? Would you want to be able to walk? To speak? To eat on your own? To live without the help of machines? · If you have a choice, where do you want to be cared for? In your home? At a hospital or nursing home? · Do you want certain Sikhism practices performed if you become very ill? · If you have a choice at the end of your life, where would you prefer to die? At home? In a hospital or nursing home? Somewhere else? · Would you prefer to be buried or cremated? · Do you want your organs to be donated after you die? What should you do with your living will? · Make sure that your family members and your health care agent have copies of your living will. · Give your doctor a copy of your living will to keep in your medical record. If you have more than one doctor, make sure that each one has a copy. · You may want to put a copy of your living will where it can be easily found. Where can you learn more? Go to http://abraham-anthony.info/. Enter R703 in the search box to learn more about \"Learning About Living Mikal. \"  Current as of: April 1, 2019  Content Version: 12.2  © 5357-8854 [employer]. Care instructions adapted under license by [employer] (which disclaims liability or warranty for this information). If you have questions about a medical condition or this instruction, always ask your healthcare professional. [address] any warranty or liability for your use of this information. Patient history reviewed patient examined. Strong suggestion to stay busy with certainly sounds like the case. Continue with the donepezil for memory support and will be seen in follow-up in 1 year. If there is any sudden trending in symptoms or concerns would recommend a quicker follow-up all things considered. Socialization is always a good medium for self improvement.   Donepezil will be renewed by request.

## 2019-11-13 ENCOUNTER — HOSPITAL ENCOUNTER (OUTPATIENT)
Dept: LAB | Age: 79
Discharge: HOME | End: 2019-11-13

## 2019-11-13 ENCOUNTER — OFFICE VISIT (OUTPATIENT)
Dept: FAMILY MEDICINE CLINIC | Age: 79
End: 2019-11-13

## 2019-11-13 VITALS
HEART RATE: 70 BPM | WEIGHT: 148.8 LBS | TEMPERATURE: 98.2 F | SYSTOLIC BLOOD PRESSURE: 149 MMHG | OXYGEN SATURATION: 96 % | RESPIRATION RATE: 16 BRPM | DIASTOLIC BLOOD PRESSURE: 79 MMHG | BODY MASS INDEX: 26.36 KG/M2 | HEIGHT: 63 IN

## 2019-11-13 DIAGNOSIS — E03.9 ACQUIRED HYPOTHYROIDISM: Primary | ICD-10-CM

## 2019-11-13 DIAGNOSIS — I10 ESSENTIAL HYPERTENSION: ICD-10-CM

## 2019-11-13 DIAGNOSIS — E03.9 ACQUIRED HYPOTHYROIDISM: ICD-10-CM

## 2019-11-13 LAB
ALBUMIN SERPL-MCNC: 3.9 G/DL (ref 3.5–5)
ALBUMIN/GLOB SERPL: 1.3 {RATIO} (ref 1.1–2.2)
ALP SERPL-CCNC: 79 U/L (ref 45–117)
ALT SERPL-CCNC: 23 U/L (ref 12–78)
ANION GAP SERPL CALC-SCNC: 4 MMOL/L (ref 5–15)
AST SERPL-CCNC: 24 U/L (ref 15–37)
BILIRUB SERPL-MCNC: 0.7 MG/DL (ref 0.2–1)
BUN SERPL-MCNC: 15 MG/DL (ref 6–20)
BUN/CREAT SERPL: 18 (ref 12–20)
CALCIUM SERPL-MCNC: 9.2 MG/DL (ref 8.5–10.1)
CHLORIDE SERPL-SCNC: 104 MMOL/L (ref 97–108)
CO2 SERPL-SCNC: 31 MMOL/L (ref 21–32)
CREAT SERPL-MCNC: 0.83 MG/DL (ref 0.55–1.02)
GLOBULIN SER CALC-MCNC: 2.9 G/DL (ref 2–4)
GLUCOSE SERPL-MCNC: 87 MG/DL (ref 65–100)
POTASSIUM SERPL-SCNC: 4.2 MMOL/L (ref 3.5–5.1)
PROT SERPL-MCNC: 6.8 G/DL (ref 6.4–8.2)
SODIUM SERPL-SCNC: 139 MMOL/L (ref 136–145)
TSH SERPL DL<=0.05 MIU/L-ACNC: 1.03 UIU/ML (ref 0.36–3.74)

## 2019-11-13 NOTE — PROGRESS NOTES
Chief Complaint   Patient presents with    Hypertension    Labs     fasting today     1. Have you been to the ER, urgent care clinic since your last visit? Hospitalized since your last visit? Yes Where: Eye issue    2. Have you seen or consulted any other health care providers outside of the [address] [place] [address] since your last visit? Include any pap smears or colon screening. No       Chief Complaint   Patient presents with    Hypertension    Labs     fasting today     She is a 67 y.o. female who presents for evalution. Reviewed PmHx, RxHx, FmHx, SocHx, AllgHx and updated and dated in the chart. Patient Active Problem List    Diagnosis    Dementia without behavioral disturbance ([facility] [address] [facility]    History of right shoulder replacement    Advanced care planning/counseling discussion     Has a LW,asked to bring in     [address] care planning     Pt has LW      Asthma    Bronchitis    Bronchospasm with bronchitis, acute    DDD (degenerative disc disease), lumbar    Tachycardia    HTN (hypertension)    Hypothyroid    OA (osteoarthritis)    GERD (gastroesophageal reflux disease)    Osteoporosis    Left knee DJD       Review of Systems - negative except as listed above in the HPI    Objective:     Vitals:    11/13/19 0855   BP: 149/79   Pulse: 70   Resp: 16   Temp: 98.2 °F (36.8 °C)   TempSrc: Oral   SpO2: 96%   Weight: 148 lb 12.8 oz (67.5 kg)   Height: 5' 3\" (1.6 m)     Physical Examination: General appearance - alert, well appearing, and in no distress  Neck - supple, no significant adenopathy  Chest - clear to auscultation, no wheezes, rales or rhonchi, symmetric air entry  Heart - normal rate, regular rhythm, normal S1, S2, no murmurs, rubs, clicks or gallops    Assessment/ Plan:   Diagnoses and all orders for this visit:    1. Acquired hypothyroidism  -     TSH 3RD GENERATION; Future    2. Essential hypertension  -     METABOLIC PANEL, COMPREHENSIVE;  Future  -ok for age     Follow-up and Dispositions    · Return in about 1 year (around 11/13/2020). I have discussed the diagnosis with the patient and the intended plan as seen in the above orders. The patient understands and agrees with the plan. The patient has received an after-visit summary and questions were answered concerning future plans. Medication Side Effects and Warnings were discussed with patient  Patient Labs were reviewed and or requested:  Patient Past Records were reviewed and or requested    Shravan Mckeon M.D. There are no Patient Instructions on file for this visit.

## 2020-02-24 RX ORDER — LOSARTAN POTASSIUM AND HYDROCHLOROTHIAZIDE 12.5; 1 MG/1; MG/1
1 TABLET ORAL DAILY
Qty: 90 TAB | Refills: 1 | Status: SHIPPED | OUTPATIENT
Start: 2020-02-24 | End: 2020-08-18 | Stop reason: SDUPTHER

## 2020-03-19 RX ORDER — LEVOTHYROXINE SODIUM 150 UG/1
TABLET ORAL
Qty: 90 TAB | Refills: 0 | Status: SHIPPED | OUTPATIENT
Start: 2020-03-19 | End: 2020-04-26

## 2020-04-26 RX ORDER — LEVOTHYROXINE SODIUM 150 UG/1
TABLET ORAL
Qty: 90 TAB | Refills: 0 | Status: SHIPPED | OUTPATIENT
Start: 2020-04-26 | End: 2020-09-21

## 2020-08-18 RX ORDER — LOSARTAN POTASSIUM AND HYDROCHLOROTHIAZIDE 12.5; 1 MG/1; MG/1
1 TABLET ORAL DAILY
Qty: 90 TAB | Refills: 1 | Status: SHIPPED | OUTPATIENT
Start: 2020-08-18 | End: 2020-08-21 | Stop reason: SDUPTHER

## 2020-08-18 NOTE — TELEPHONE ENCOUNTER
Please call patient when refill has been sent to [facility].  Patient needs refill & has made a CPE appt 11/09/20 w/Dr Teri Chamorro

## 2020-08-21 RX ORDER — LOSARTAN POTASSIUM AND HYDROCHLOROTHIAZIDE 12.5; 1 MG/1; MG/1
1 TABLET ORAL DAILY
Qty: 90 TAB | Refills: 1 | Status: SHIPPED | OUTPATIENT
Start: 2020-08-21 | End: 2022-06-28

## 2020-09-21 RX ORDER — LEVOTHYROXINE SODIUM 150 UG/1
TABLET ORAL
Qty: 90 TAB | Refills: 0 | Status: SHIPPED | OUTPATIENT
Start: 2020-09-21 | End: 2020-12-30

## 2020-11-09 ENCOUNTER — OFFICE VISIT (OUTPATIENT)
Dept: FAMILY MEDICINE CLINIC | Age: 80
End: 2020-11-09
Payer: MEDICARE

## 2020-11-09 VITALS
BODY MASS INDEX: 27.09 KG/M2 | RESPIRATION RATE: 17 BRPM | WEIGHT: 152.9 LBS | HEIGHT: 63 IN | HEART RATE: 75 BPM | DIASTOLIC BLOOD PRESSURE: 84 MMHG | TEMPERATURE: 98.4 F | OXYGEN SATURATION: 96 % | SYSTOLIC BLOOD PRESSURE: 157 MMHG

## 2020-11-09 DIAGNOSIS — I10 ESSENTIAL HYPERTENSION: ICD-10-CM

## 2020-11-09 DIAGNOSIS — Z00.00 ROUTINE GENERAL MEDICAL EXAMINATION AT A HEALTH CARE FACILITY: Primary | ICD-10-CM

## 2020-11-09 DIAGNOSIS — Z71.89 ADVANCE CARE PLANNING: ICD-10-CM

## 2020-11-09 DIAGNOSIS — F03.90 DEMENTIA WITHOUT BEHAVIORAL DISTURBANCE, UNSPECIFIED DEMENTIA TYPE: ICD-10-CM

## 2020-11-09 DIAGNOSIS — E03.9 ACQUIRED HYPOTHYROIDISM: ICD-10-CM

## 2020-11-09 DIAGNOSIS — F33.0 DEPRESSION, MAJOR, RECURRENT, MILD (HCC): ICD-10-CM

## 2020-11-09 LAB
BASOPHILS # BLD: 0.1 K/UL (ref 0–0.1)
BASOPHILS NFR BLD: 1 % (ref 0–1)
DIFFERENTIAL METHOD BLD: ABNORMAL
EOSINOPHIL # BLD: 0 K/UL (ref 0–0.4)
EOSINOPHIL NFR BLD: 0 % (ref 0–7)
ERYTHROCYTE [DISTWIDTH] IN BLOOD BY AUTOMATED COUNT: 13.1 % (ref 11.5–14.5)
HCT VFR BLD AUTO: 48.6 % (ref 35–47)
HGB BLD-MCNC: 15.4 G/DL (ref 11.5–16)
IMM GRANULOCYTES # BLD AUTO: 0 K/UL (ref 0–0.04)
IMM GRANULOCYTES NFR BLD AUTO: 0 % (ref 0–0.5)
LYMPHOCYTES # BLD: 1.4 K/UL (ref 0.8–3.5)
LYMPHOCYTES NFR BLD: 20 % (ref 12–49)
MCH RBC QN AUTO: 30 PG (ref 26–34)
MCHC RBC AUTO-ENTMCNC: 31.7 G/DL (ref 30–36.5)
MCV RBC AUTO: 94.7 FL (ref 80–99)
MONOCYTES # BLD: 0.4 K/UL (ref 0–1)
MONOCYTES NFR BLD: 5 % (ref 5–13)
NEUTS SEG # BLD: 4.9 K/UL (ref 1.8–8)
NEUTS SEG NFR BLD: 74 % (ref 32–75)
NRBC # BLD: 0 K/UL (ref 0–0.01)
NRBC BLD-RTO: 0 PER 100 WBC
PLATELET # BLD AUTO: 232 K/UL (ref 150–400)
PMV BLD AUTO: 10.6 FL (ref 8.9–12.9)
RBC # BLD AUTO: 5.13 M/UL (ref 3.8–5.2)
WBC # BLD AUTO: 6.8 K/UL (ref 3.6–11)

## 2020-11-09 PROCEDURE — 1101F PT FALLS ASSESS-DOCD LE1/YR: CPT | Performed by: FAMILY MEDICINE

## 2020-11-09 PROCEDURE — G8754 DIAS BP LESS 90: HCPCS | Performed by: FAMILY MEDICINE

## 2020-11-09 PROCEDURE — 99214 OFFICE O/P EST MOD 30 MIN: CPT | Performed by: FAMILY MEDICINE

## 2020-11-09 PROCEDURE — G8427 DOCREV CUR MEDS BY ELIG CLIN: HCPCS | Performed by: FAMILY MEDICINE

## 2020-11-09 PROCEDURE — G0439 PPPS, SUBSEQ VISIT: HCPCS | Performed by: FAMILY MEDICINE

## 2020-11-09 PROCEDURE — G8419 CALC BMI OUT NRM PARAM NOF/U: HCPCS | Performed by: FAMILY MEDICINE

## 2020-11-09 PROCEDURE — G8536 NO DOC ELDER MAL SCRN: HCPCS | Performed by: FAMILY MEDICINE

## 2020-11-09 PROCEDURE — 1090F PRES/ABSN URINE INCON ASSESS: CPT | Performed by: FAMILY MEDICINE

## 2020-11-09 PROCEDURE — G0463 HOSPITAL OUTPT CLINIC VISIT: HCPCS | Performed by: FAMILY MEDICINE

## 2020-11-09 PROCEDURE — G8753 SYS BP > OR = 140: HCPCS | Performed by: FAMILY MEDICINE

## 2020-11-09 PROCEDURE — G8510 SCR DEP NEG, NO PLAN REQD: HCPCS | Performed by: FAMILY MEDICINE

## 2020-11-09 NOTE — PROGRESS NOTES
Chief Complaint   Patient presents with   [facility]ruce Annual Wellness Visit    Labs     Fasting today    Hypertension    Thyroid Problem     1. Have you been to the ER, urgent care clinic since your last visit? Hospitalized since your last visit? No    2. Have you seen or consulted any other health care providers outside of the [address] [place] [address] since your last visit? Include any pap smears or colon screening. No        Medicare Wellness Exam:    Chief Complaint   Patient presents with    Annual Wellness Visit    Labs     Fasting today    Hypertension    Thyroid Problem     she is a 79y.o. year old female who presents for evaluation for their Medicare Wellness Visit. Hiren Glatter is completed and assessed=yes  Depression Screen is completed and assessed=yes  Medication list reviewed and adjusted for accuracy=yes  Immunizations reviewed and updated=yes  Health/Preventative Screenings reviewed and updated=yes  ADL Functions reviewed=yes    Patient Active Problem List    Diagnosis    [facility] disturbance ([facility] [address] [facility]    History of right shoulder replacement    Advanced care planning/counseling discussion     Has a LW,asked to bring in     [address] care planning     Pt has LW      Asthma    Bronchitis    Bronchospasm with bronchitis, acute    DDD (degenerative disc disease), lumbar    Tachycardia    HTN (hypertension)    Hypothyroid    OA (osteoarthritis)    GERD (gastroesophageal reflux disease)    Osteoporosis    Left knee DJD       Reviewed PmHx, RxHx, FmHx, SocHx, AllgHx and updated and dated in the chart.     Review of Systems - negative except as listed above in the HPI    Objective:     Vitals:    11/09/20 0855   BP: (!) 157/84   Pulse: 75   Resp: 17   Temp: 98.4 °F (36.9 °C)   TempSrc: Oral   SpO2: 96%   Weight: 152 lb 14.4 oz (69.4 kg)   Height: 5' 3\" (1.6 m)     Physical Examination: General appearance - alert, well appearing, and in no distress  Neck - supple, no significant adenopathy  Chest - clear to auscultation, no wheezes, rales or rhonchi, symmetric air entry  Heart - normal rate, regular rhythm, normal S1, S2, no murmurs, rubs, clicks or gallops  Abdomen - soft, nontender, nondistended, no masses or organomegaly    Assessment/ Plan:   Diagnoses and all orders for this visit:    1. Routine general medical examination at a health care facility  -     LIPID PANEL; Future  -     METABOLIC PANEL, COMPREHENSIVE; Future  -     CBC WITH AUTOMATED DIFF; Future  -     HEMOGLOBIN A1C WITH EAG; Future    2. Dementia without behavioral disturbance, unspecified dementia type (HCC)  -moderate    3. Depression, major, recurrent, mild (HCC)  -stable    4. Essential hypertension  -     LIPID PANEL; Future  -     METABOLIC PANEL, COMPREHENSIVE; Future  -at goal for age    11. Acquired hypothyroidism  -     TSH 3RD GENERATION; Future    6. Advance care planning  -has lw       -Pain evaluation performed in office  -Cognitive Screen performed in office  -Depression Screen, Fall risks (by up and go test)  and ADL functionality were addressed  -Medication list updated and reviewed for any changes   -A comprehensive review of medical issues and a plan was formulated  -End of life planning was addressed with pt   -Health Screenings for preventions were addressed and a plan was formulated  -Shingles Vaccine was recommended  -Discussed with patient cancer risk factors and appropriate screenings for age  -Patient evaluated for colonoscopy and referred if needed per screeing criteria  -Labs from previous visits were discussed with patient   -Discussed with patient diet and exercise and formulated a plan as needed  -An Advanced care plan was developed with the patient.  -Alcohol screening performed and was negative    -    I have discussed the diagnosis with the patient and the intended plan as seen in the above orders. The patient understands and agrees with the plan.  The patient has received an after-visit summary and questions were answered concerning future plans. Medication Side Effects and Warnings were discussed with patien  Patient Labs were reviewed and or requested  Patient Past Records were reviewed and or requested    There are no Patient Instructions on file for this visit.       Rebeca Montoya M.D.

## 2020-11-10 LAB
ALBUMIN SERPL-MCNC: 4.1 G/DL (ref 3.5–5)
ALBUMIN/GLOB SERPL: 1.4 {RATIO} (ref 1.1–2.2)
ALP SERPL-CCNC: 80 U/L (ref 45–117)
ALT SERPL-CCNC: 22 U/L (ref 12–78)
ANION GAP SERPL CALC-SCNC: 6 MMOL/L (ref 5–15)
AST SERPL-CCNC: 20 U/L (ref 15–37)
BILIRUB SERPL-MCNC: 0.7 MG/DL (ref 0.2–1)
BUN SERPL-MCNC: 13 MG/DL (ref 6–20)
BUN/CREAT SERPL: 18 (ref 12–20)
CALCIUM SERPL-MCNC: 9.2 MG/DL (ref 8.5–10.1)
CHLORIDE SERPL-SCNC: 105 MMOL/L (ref 97–108)
CHOLEST SERPL-MCNC: 192 MG/DL
CO2 SERPL-SCNC: 29 MMOL/L (ref 21–32)
CREAT SERPL-MCNC: 0.72 MG/DL (ref 0.55–1.02)
EST. AVERAGE GLUCOSE BLD GHB EST-MCNC: 111 MG/DL
GLOBULIN SER CALC-MCNC: 3 G/DL (ref 2–4)
GLUCOSE SERPL-MCNC: 90 MG/DL (ref 65–100)
HBA1C MFR BLD: 5.5 % (ref 4–5.6)
HDLC SERPL-MCNC: 70 MG/DL
HDLC SERPL: 2.7 {RATIO} (ref 0–5)
LDLC SERPL CALC-MCNC: 97.4 MG/DL (ref 0–100)
LIPID PROFILE,FLP: NORMAL
POTASSIUM SERPL-SCNC: 4.2 MMOL/L (ref 3.5–5.1)
PROT SERPL-MCNC: 7.1 G/DL (ref 6.4–8.2)
SODIUM SERPL-SCNC: 140 MMOL/L (ref 136–145)
TRIGL SERPL-MCNC: 123 MG/DL (ref ?–150)
TSH SERPL DL<=0.05 MIU/L-ACNC: 0.68 UIU/ML (ref 0.36–3.74)
VLDLC SERPL CALC-MCNC: 24.6 MG/DL

## 2020-12-30 RX ORDER — LEVOTHYROXINE SODIUM 150 UG/1
TABLET ORAL
Qty: 90 TAB | Refills: 0 | Status: SHIPPED | OUTPATIENT
Start: 2020-12-30 | End: 2021-04-02

## 2021-01-08 ENCOUNTER — TELEPHONE (OUTPATIENT)
Dept: FAMILY MEDICINE CLINIC | Age: 81
End: 2021-01-08

## 2021-01-08 NOTE — TELEPHONE ENCOUNTER
Please let the patient's daughter know I have looked at Dr. Kasia Vasquez last note and I do not see were he discontinued any medication. It appears she has been on these medications for sometime.  thanks

## 2021-01-08 NOTE — TELEPHONE ENCOUNTER
Patients daughter Pillo Mina would like to know if patient should be taking both the metoprolol and the losartan     Munira(on [id number] [phone]

## 2021-01-08 NOTE — TELEPHONE ENCOUNTER
LVM to continue both medications, for we have no documentation that Dr. Jitendra Bryson discontinued either one. If she has any questions, she can call our office back.

## 2021-01-15 ENCOUNTER — TELEPHONE (OUTPATIENT)
Dept: FAMILY MEDICINE CLINIC | Age: 81
End: 2021-01-15

## 2021-01-15 NOTE — TELEPHONE ENCOUNTER
Patient's daughter/Munira Carlosanabel Coleman would like to discuss patient's medications.  Ms Swan's phone: [phone]

## 2021-01-18 NOTE — TELEPHONE ENCOUNTER
Called and LVM for Patient's daughter. Severa Johnson on HIPAA) Requested call back to office discuss concerns.

## 2021-01-20 NOTE — TELEPHONE ENCOUNTER
Left voicemail for daughter to return call to office regarding her call about mother's medications. 2nd attempt.

## 2021-03-03 DIAGNOSIS — F03.90 DEMENTIA WITHOUT BEHAVIORAL DISTURBANCE, UNSPECIFIED DEMENTIA TYPE: Primary | ICD-10-CM

## 2021-03-17 RX ORDER — DONEPEZIL HYDROCHLORIDE 10 MG/1
10 TABLET, FILM COATED ORAL DAILY
Qty: 90 TAB | Refills: 3 | Status: SHIPPED | OUTPATIENT
Start: 2021-03-17

## 2021-04-02 ENCOUNTER — OFFICE VISIT (OUTPATIENT)
Dept: FAMILY MEDICINE CLINIC | Age: 81
End: 2021-04-02
Payer: MEDICARE

## 2021-04-02 VITALS
HEART RATE: 74 BPM | WEIGHT: 142.6 LBS | BODY MASS INDEX: 25.27 KG/M2 | OXYGEN SATURATION: 95 % | HEIGHT: 63 IN | TEMPERATURE: 97.1 F | DIASTOLIC BLOOD PRESSURE: 93 MMHG | SYSTOLIC BLOOD PRESSURE: 154 MMHG

## 2021-04-02 DIAGNOSIS — N63.0 BREAST MASS: ICD-10-CM

## 2021-04-02 DIAGNOSIS — R35.0 URINARY FREQUENCY: ICD-10-CM

## 2021-04-02 DIAGNOSIS — M25.551 RIGHT HIP PAIN: Primary | ICD-10-CM

## 2021-04-02 LAB
BILIRUB UR QL STRIP: NORMAL
GLUCOSE UR-MCNC: NEGATIVE MG/DL
KETONES P FAST UR STRIP-MCNC: NEGATIVE MG/DL
PH UR STRIP: 5.5 [PH] (ref 4.6–8)
PROT UR QL STRIP: NORMAL
SP GR UR STRIP: 1.03 (ref 1–1.03)
UA UROBILINOGEN AMB POC: NORMAL (ref 0.2–1)
URINALYSIS CLARITY POC: CLEAR
URINALYSIS COLOR POC: NORMAL
URINE BLOOD POC: NORMAL
URINE LEUKOCYTES POC: NEGATIVE
URINE NITRITES POC: NEGATIVE

## 2021-04-02 PROCEDURE — G8510 SCR DEP NEG, NO PLAN REQD: HCPCS | Performed by: STUDENT IN AN ORGANIZED HEALTH CARE EDUCATION/TRAINING PROGRAM

## 2021-04-02 PROCEDURE — 99214 OFFICE O/P EST MOD 30 MIN: CPT | Performed by: STUDENT IN AN ORGANIZED HEALTH CARE EDUCATION/TRAINING PROGRAM

## 2021-04-02 PROCEDURE — 1101F PT FALLS ASSESS-DOCD LE1/YR: CPT | Performed by: STUDENT IN AN ORGANIZED HEALTH CARE EDUCATION/TRAINING PROGRAM

## 2021-04-02 PROCEDURE — G0463 HOSPITAL OUTPT CLINIC VISIT: HCPCS | Performed by: STUDENT IN AN ORGANIZED HEALTH CARE EDUCATION/TRAINING PROGRAM

## 2021-04-02 PROCEDURE — G8753 SYS BP > OR = 140: HCPCS | Performed by: STUDENT IN AN ORGANIZED HEALTH CARE EDUCATION/TRAINING PROGRAM

## 2021-04-02 PROCEDURE — G8427 DOCREV CUR MEDS BY ELIG CLIN: HCPCS | Performed by: STUDENT IN AN ORGANIZED HEALTH CARE EDUCATION/TRAINING PROGRAM

## 2021-04-02 PROCEDURE — G8536 NO DOC ELDER MAL SCRN: HCPCS | Performed by: STUDENT IN AN ORGANIZED HEALTH CARE EDUCATION/TRAINING PROGRAM

## 2021-04-02 PROCEDURE — G8419 CALC BMI OUT NRM PARAM NOF/U: HCPCS | Performed by: STUDENT IN AN ORGANIZED HEALTH CARE EDUCATION/TRAINING PROGRAM

## 2021-04-02 PROCEDURE — 81003 URINALYSIS AUTO W/O SCOPE: CPT | Performed by: STUDENT IN AN ORGANIZED HEALTH CARE EDUCATION/TRAINING PROGRAM

## 2021-04-02 PROCEDURE — G8755 DIAS BP > OR = 90: HCPCS | Performed by: STUDENT IN AN ORGANIZED HEALTH CARE EDUCATION/TRAINING PROGRAM

## 2021-04-02 PROCEDURE — 1090F PRES/ABSN URINE INCON ASSESS: CPT | Performed by: STUDENT IN AN ORGANIZED HEALTH CARE EDUCATION/TRAINING PROGRAM

## 2021-04-02 NOTE — PATIENT INSTRUCTIONS
Increase water intake, urine should be light yellow to clear. Call Dr. Ramond Dancer back if you choose to proceed with breast imaging, ultrasound and/or mammogram. 
 
Use non-steroidal anti-inflammatories for pain: advil, motrin, aleve. We will be in touch regarding the results of the urine culture and xray of hip.

## 2021-04-02 NOTE — PROGRESS NOTES
Santiago Bull is a 79 y.o. female , id x 2(name and ). Reviewed record, history, and  medications. Chief Complaint   Patient presents with    Hip Pain     right, increases with movement     Follow-up     above right breast     UTI     daughter worried about increased uriation        Vitals:    21 1120   BP: (!) 154/93   Pulse: 74   Temp: 97.1 °F (36.2 °C)   SpO2: 95%   Weight: 142 lb 9.6 oz (64.7 kg)   Height: 5' 3\" (1.6 m)       Coordination of Care Questionnaire:   1) Have you been to an emergency room, urgent care, or hospitalized since your last visit?   no       2. Have seen or consulted any other health care provider since your last visit? NO      3 most recent PHQ Screens 2021   Little interest or pleasure in doing things Not at all   Feeling down, depressed, irritable, or hopeless Not at all   Total Score PHQ 2 0       Patient is accompanied by daughter I have received verbal consent from Santiago Bull to discuss any/all medical information while they are present in the room.

## 2021-04-02 NOTE — PROGRESS NOTES
Progress Note    she is a 79y.o. year old female who presents for evalution. Subjective:     Chief Complaint   Patient presents with    Hip Pain     right, increases with movement     Follow-up     above right breast     UTI     daughter worried about increased uriation      Right hip pain really bad for the last week. Back surgery years ago. Described as sharp  Trouble getting up and down. Walking slow. Came on gradually. No injury recalled. No radiation of pain, weakness, FND. Increased urination noted yesterday. Possible knot noted above right breast.      Reviewed PmHx, RxHx, FmHx, SocHx, AllgHx and updated and dated in the chart. Review of Systems - negative except as listed above in the HPI    Objective:     Vitals:    04/02/21 1120   BP: (!) 154/93   Pulse: 74   Temp: 97.1 °F (36.2 °C)   SpO2: 95%   Weight: 142 lb 9.6 oz (64.7 kg)   Height: 5' 3\" (1.6 m)       Current Outpatient Medications   Medication Sig    donepeziL (ARICEPT) 10 mg tablet Take 1 Tab by mouth daily. Indications: mild to moderate Alzheimer's type dementia    losartan-hydroCHLOROthiazide (HYZAAR) 100-12.5 mg per tablet Take 1 Tab by mouth daily.  levothyroxine (SYNTHROID) 150 mcg tablet TAKE 1 TABLET BY MOUTH ONCE DAILY BEFORE BREAKFAST     No current facility-administered medications for this visit. Physical Exam  Vitals signs and nursing note reviewed. Exam conducted with a chaperone present. Constitutional:       General: She is not in acute distress. Appearance: Normal appearance. She is not ill-appearing, toxic-appearing or diaphoretic. HENT:      Head: Normocephalic and atraumatic. Eyes:      General: No scleral icterus. Right eye: No discharge. Left eye: No discharge. Conjunctiva/sclera: Conjunctivae normal.   Neck:      Musculoskeletal: No neck rigidity. Cardiovascular:      Rate and Rhythm: Normal rate and regular rhythm. Pulses: Normal pulses. Heart sounds: Normal heart sounds. Pulmonary:      Effort: Pulmonary effort is normal. No respiratory distress. Breath sounds: Normal breath sounds. Chest:      Breasts:         Right: No nipple discharge or skin change. Left: Mass (Superior to left breast implant is an ovoid nodule, similar in consistency to the implant) present. No nipple discharge or skin change. Comments: Bilateral breast implants  Musculoskeletal:         General: Tenderness (tender over right gluteals and lateral proximal femur) present. No swelling or deformity. Right lower leg: No edema. Left lower leg: No edema. Lymphadenopathy:      Upper Body:      Right upper body: No supraclavicular, axillary or pectoral adenopathy. Left upper body: No supraclavicular, axillary or pectoral adenopathy. Skin:     General: Skin is warm and dry. Neurological:      General: No focal deficit present. Mental Status: She is alert. Psychiatric:         Mood and Affect: Mood normal.         Behavior: Behavior normal.         Thought Content: Thought content normal.         Judgment: Judgment normal.            Assessment/ Plan:   Diagnoses and all orders for this visit:    1. Right hip pain -suspect arthritis, given tenderness over femoral head, recommended x-ray. -     XR HIP RT W OR WO PELV 2-3 VWS; Future    2. Urinary frequency -UA not consistent with UTI, will send for culture  -     CULTURE, URINE; Future  -     AMB POC URINALYSIS DIP STICK AUTO W/O MICRO    3. Breast mass -reviewed next steps as imaging, mammogram and/or ultrasound. Patient's daughter wishes to hold off at this time, will contact if they change their mind. Follow-up and Dispositions    · Return if symptoms worsen or fail to improve. I have discussed the diagnosis with the patient and the intended plan as seen in the above orders.   The patient has received an after-visit summary and questions were answered concerning future plans. Pt conveyed understanding of plan.     Medication Side Effects and Warnings were discussed with patient      Daniella Crowley MD

## 2021-04-04 LAB
BACTERIA SPEC CULT: NORMAL
CC UR VC: NORMAL
SERVICE CMNT-IMP: NORMAL

## 2021-04-06 ENCOUNTER — HOSPITAL ENCOUNTER (OUTPATIENT)
Dept: GENERAL RADIOLOGY | Age: 81
Discharge: HOME | End: 2021-04-06
Payer: MEDICARE

## 2021-04-06 DIAGNOSIS — M25.551 RIGHT HIP PAIN: ICD-10-CM

## 2021-04-06 PROCEDURE — 73502 X-RAY EXAM HIP UNI 2-3 VIEWS: CPT

## 2021-05-04 RX ORDER — LEVOTHYROXINE SODIUM 150 UG/1
TABLET ORAL
Qty: 30 TAB | Refills: 5 | Status: SHIPPED | OUTPATIENT
Start: 2021-05-04

## 2021-05-10 ENCOUNTER — TELEPHONE (OUTPATIENT)
Dept: FAMILY MEDICINE CLINIC | Age: 81
End: 2021-05-10

## 2021-05-10 NOTE — TELEPHONE ENCOUNTER
Received call from patient's daughter. Edelmira Black on HIPAA)    Patient is requesting recommendation for:  Hearing Testing. Please call Saintclair Lynch back with referral contact info. Okay per Saintclair Lynch to [facility]. [phone].

## 2021-09-02 ENCOUNTER — TELEPHONE (OUTPATIENT)
Dept: FAMILY MEDICINE CLINIC | Age: 81
End: 2021-09-02

## 2021-09-02 NOTE — TELEPHONE ENCOUNTER
Patient's daughter/Munira Lorenzana would like to discuss patients dementia and wanting to put her in a memory facility.  Please call Ms Dimitry Lorenzana at: [phone]

## 2021-09-02 NOTE — TELEPHONE ENCOUNTER
Received call from patient's daughter. Anisa Payne on HIPAA and POA) Romario Sherman states that patient is showing  Increasing signs of dementia. ([place] symptoms, irritability, irrational, and not recognizing daughter as family member)    Patient stopped by office today with son and scheduled office appointment with Dr Carter Jeffers for 9/13/21. Daughter is requesting to discuss [address] Admission when she comes in on day of appointment without upsetting patient.

## 2021-09-03 ENCOUNTER — TELEPHONE (OUTPATIENT)
Dept: FAMILY MEDICINE CLINIC | Age: 81
End: 2021-09-03

## 2021-09-03 NOTE — TELEPHONE ENCOUNTER
Attempt to reach caregiver unsuccessful. LVM for caregiver to [facility] to discuss concerns.

## 2021-09-03 NOTE — TELEPHONE ENCOUNTER
Caregiver/Munira returning call to nurse as phone line cut off. She is stating she did speak with nurse yesterday and all questions were answered. If anything else to discuss please call: [phone].  Please leave a message if she doesn't answer

## 2021-09-13 ENCOUNTER — TELEPHONE (OUTPATIENT)
Dept: FAMILY MEDICINE CLINIC | Age: 81
End: 2021-09-13

## 2021-09-13 ENCOUNTER — OFFICE VISIT (OUTPATIENT)
Dept: FAMILY MEDICINE CLINIC | Age: 81
End: 2021-09-13
Payer: MEDICARE

## 2021-09-13 VITALS
BODY MASS INDEX: 22.86 KG/M2 | RESPIRATION RATE: 16 BRPM | OXYGEN SATURATION: 97 % | DIASTOLIC BLOOD PRESSURE: 69 MMHG | WEIGHT: 129 LBS | HEART RATE: 80 BPM | HEIGHT: 63 IN | TEMPERATURE: 98.2 F | SYSTOLIC BLOOD PRESSURE: 119 MMHG

## 2021-09-13 DIAGNOSIS — F33.0 DEPRESSION, MAJOR, RECURRENT, MILD (HCC): ICD-10-CM

## 2021-09-13 DIAGNOSIS — I10 ESSENTIAL HYPERTENSION: ICD-10-CM

## 2021-09-13 DIAGNOSIS — F03.90 DEMENTIA WITHOUT BEHAVIORAL DISTURBANCE, UNSPECIFIED DEMENTIA TYPE: Primary | ICD-10-CM

## 2021-09-13 PROCEDURE — G8510 SCR DEP NEG, NO PLAN REQD: HCPCS | Performed by: FAMILY MEDICINE

## 2021-09-13 PROCEDURE — G0463 HOSPITAL OUTPT CLINIC VISIT: HCPCS | Performed by: FAMILY MEDICINE

## 2021-09-13 PROCEDURE — G8427 DOCREV CUR MEDS BY ELIG CLIN: HCPCS | Performed by: FAMILY MEDICINE

## 2021-09-13 PROCEDURE — G8536 NO DOC ELDER MAL SCRN: HCPCS | Performed by: FAMILY MEDICINE

## 2021-09-13 PROCEDURE — 99214 OFFICE O/P EST MOD 30 MIN: CPT | Performed by: FAMILY MEDICINE

## 2021-09-13 PROCEDURE — 1090F PRES/ABSN URINE INCON ASSESS: CPT | Performed by: FAMILY MEDICINE

## 2021-09-13 PROCEDURE — 1101F PT FALLS ASSESS-DOCD LE1/YR: CPT | Performed by: FAMILY MEDICINE

## 2021-09-13 PROCEDURE — G8420 CALC BMI NORM PARAMETERS: HCPCS | Performed by: FAMILY MEDICINE

## 2021-09-13 PROCEDURE — G8752 SYS BP LESS 140: HCPCS | Performed by: FAMILY MEDICINE

## 2021-09-13 PROCEDURE — G8754 DIAS BP LESS 90: HCPCS | Performed by: FAMILY MEDICINE

## 2021-09-13 NOTE — PROGRESS NOTES
Chief Complaint   Patient presents with    Memory Loss    Thyroid Problem    Hypertension    Labs     1. Have you been to the ER, urgent care clinic since your last visit? Hospitalized since your last visit? No    2. Have you seen or consulted any other health care providers outside of the [address] [place] [address] since your last visit? Include any pap smears or colon screening. No               Chief Complaint   Patient presents with    Memory Loss    Thyroid Problem    Hypertension    Labs     She is a 79 y.o. female who presents for evalution. Reviewed PmHx, RxHx, FmHx, SocHx, AllgHx and updated and dated in the chart. Patient Active Problem List    Diagnosis    Dementia without behavioral disturbance [facility])     Seen by Neurology, Dr. Davian Jhaveri, 5/2021      History of right shoulder replacement    Advanced care planning/counseling discussion     Has a LW,asked to bring in     [address] care planning     Pt has LW      Asthma    Bronchitis    Bronchospasm with bronchitis, acute    DDD (degenerative disc disease), lumbar    Tachycardia    HTN (hypertension)    Hypothyroid    OA (osteoarthritis)    GERD (gastroesophageal reflux disease)    Osteoporosis    Left knee DJD       Review of Systems - negative except as listed above in the HPI    Objective:     Vitals:    09/13/21 1425   BP: 119/69   Pulse: 80   Resp: 16   Temp: 98.2 °F (36.8 °C)   TempSrc: Oral   SpO2: 97%   Weight: 129 lb (58.5 kg)   Height: 5' 3\" (1.6 m)         Assessment/ Plan:   Diagnoses and all orders for this visit:    1. Dementia without behavioral disturbance, unspecified dementia type ([address]  2. Depression, major, recurrent, mild ([address]  3. Essential hypertension  Point time was greater than 15 minutes with greater than half of it spent counseling. Discussed with caretaker who is her daughter the ongoing issues and problems with taking care of her mother on a daily basis.   Patient is seeing a neurologist. Patient increasing becoming agitated and having sundowning at home and being very belligerent with her daughter. Daughter is not in search of memory unit to help take care of her mother. During examination patient cannot recall any presidents, did not recognize her daughter nor can she tell me when she had for breakfast or lunch. Patient has significant advanced Alzheimer's. I do recommend memory unit for best care. I did discuss with daughter to go ahead and start the Seroquel that was given to her by the neurologist to see if this helps. Continue with Aricept as planned. I have discussed the diagnosis with the patient and the intended plan as seen in the above orders. The patient understands and agrees with the plan. The patient has received an after-visit summary and questions were answered concerning future plans. Medication Side Effects and Warnings were discussed with patient  Patient Labs were reviewed and or requested:  Patient Past Records were reviewed and or requested    Ethan Madsen M.D. There are no Patient Instructions on file for this visit.

## 2021-10-27 ENCOUNTER — OFFICE VISIT (OUTPATIENT)
Dept: FAMILY MEDICINE CLINIC | Age: 81
End: 2021-10-27
Payer: MEDICARE

## 2021-10-27 ENCOUNTER — DOCUMENTATION ONLY (OUTPATIENT)
Dept: FAMILY MEDICINE CLINIC | Age: 81
End: 2021-10-27

## 2021-10-27 VITALS
RESPIRATION RATE: 18 BRPM | HEIGHT: 63 IN | SYSTOLIC BLOOD PRESSURE: 139 MMHG | HEART RATE: 81 BPM | BODY MASS INDEX: 23.39 KG/M2 | DIASTOLIC BLOOD PRESSURE: 89 MMHG | TEMPERATURE: 98.3 F | WEIGHT: 132 LBS | OXYGEN SATURATION: 97 %

## 2021-10-27 DIAGNOSIS — F03.90 DEMENTIA WITHOUT BEHAVIORAL DISTURBANCE, UNSPECIFIED DEMENTIA TYPE: Primary | ICD-10-CM

## 2021-10-27 DIAGNOSIS — I10 PRIMARY HYPERTENSION: ICD-10-CM

## 2021-10-27 DIAGNOSIS — Z11.1 PPD SCREENING TEST: ICD-10-CM

## 2021-10-27 PROCEDURE — G8427 DOCREV CUR MEDS BY ELIG CLIN: HCPCS | Performed by: FAMILY MEDICINE

## 2021-10-27 PROCEDURE — G8752 SYS BP LESS 140: HCPCS | Performed by: FAMILY MEDICINE

## 2021-10-27 PROCEDURE — G0463 HOSPITAL OUTPT CLINIC VISIT: HCPCS | Performed by: FAMILY MEDICINE

## 2021-10-27 PROCEDURE — 1090F PRES/ABSN URINE INCON ASSESS: CPT | Performed by: FAMILY MEDICINE

## 2021-10-27 PROCEDURE — 1101F PT FALLS ASSESS-DOCD LE1/YR: CPT | Performed by: FAMILY MEDICINE

## 2021-10-27 PROCEDURE — 99213 OFFICE O/P EST LOW 20 MIN: CPT | Performed by: FAMILY MEDICINE

## 2021-10-27 PROCEDURE — G8510 SCR DEP NEG, NO PLAN REQD: HCPCS | Performed by: FAMILY MEDICINE

## 2021-10-27 PROCEDURE — G8420 CALC BMI NORM PARAMETERS: HCPCS | Performed by: FAMILY MEDICINE

## 2021-10-27 PROCEDURE — G8754 DIAS BP LESS 90: HCPCS | Performed by: FAMILY MEDICINE

## 2021-10-27 PROCEDURE — 86580 TB INTRADERMAL TEST: CPT | Performed by: FAMILY MEDICINE

## 2021-10-27 PROCEDURE — G8536 NO DOC ELDER MAL SCRN: HCPCS | Performed by: FAMILY MEDICINE

## 2021-10-27 NOTE — PROGRESS NOTES
Completed [address] Exam Form faxed to [phone]. Confirmation received. Forms placed in Marylee's drawer and copy sent to scan.

## 2021-10-27 NOTE — PROGRESS NOTES
Patient here for paperwork brought in by family for assistant living. 1. Have you been to the ER, urgent care clinic since your last visit? Hospitalized since your last visit? No    2. Have you seen or consulted any other health care providers outside of the [address] [place] [address] since your last visit? Include any pap smears or colon screening. No                Chief Complaint   Patient presents with    Follow-up     forms     She is a 79 y.o. female who presents for evalution. Reviewed PmHx, RxHx, FmHx, SocHx, AllgHx and updated and dated in the chart. Patient Active Problem List    Diagnosis    Dementia without behavioral disturbance [facility])     Seen by Neurology, Dr. Louie Jeronimo, 5/2021      History of right shoulder replacement    Advanced care planning/counseling discussion     Has a LW,asked to bring in     [address] care planning     Pt has LW      Asthma    Bronchitis    Bronchospasm with bronchitis, acute    DDD (degenerative disc disease), lumbar    Tachycardia    HTN (hypertension)    Hypothyroid    OA (osteoarthritis)    GERD (gastroesophageal reflux disease)    Osteoporosis    Left knee DJD       Review of Systems - negative except as listed above in the HPI    Objective:     Vitals:    10/27/21 1459   BP: 139/89   Pulse: 81   Resp: 18   Temp: 98.3 °F (36.8 °C)   SpO2: 97%   Weight: 132 lb (59.9 kg)   Height: 5' 3\" (1.6 m)         Assessment/ Plan:   Diagnoses and all orders for this visit:    1. Dementia without behavioral disturbance, unspecified dementia type ([facility]  Forms filled out for nursing home  2. PPD screening test  -     AMB POC TUBERCULOSIS, INTRADERMAL (SKIN TEST)  Recheck in 2 days  3. Primary hypertension  Blood pressure goal        I have discussed the diagnosis with the patient and the intended plan as seen in the above orders. The patient understands and agrees with the plan.  The patient has received an after-visit summary and questions were answered concerning future plans. Medication Side Effects and Warnings were discussed with patient  Patient Labs were reviewed and or requested:  Patient Past Records were reviewed and or requested    Denis Anne M.D. There are no Patient Instructions on file for this visit.

## 2021-11-03 ENCOUNTER — TELEPHONE (OUTPATIENT)
Dept: FAMILY MEDICINE CLINIC | Age: 81
End: 2021-11-03

## 2021-11-04 ENCOUNTER — TELEPHONE (OUTPATIENT)
Dept: FAMILY MEDICINE CLINIC | Age: 81
End: 2021-11-04

## 2021-11-04 NOTE — TELEPHONE ENCOUNTER
Called and spoke to patient's daughter. Marbin Ventura on HIPAA)     Daughter states she does not know why [address] would be needing any info as patient is now residing at [facility]. Daughter states understanding to call back to office if info required.

## 2021-11-04 NOTE — TELEPHONE ENCOUNTER
Spoke with cj richards from [facility] calling needed last office notes to get services started for pt. This has been faxed with confirmation received.  cj's phone number is [phone]

## 2022-03-18 PROBLEM — Z71.89 ADVANCED CARE PLANNING/COUNSELING DISCUSSION: Status: ACTIVE | Noted: 2017-03-27

## 2022-03-19 PROBLEM — Z96.611 HISTORY OF RIGHT SHOULDER REPLACEMENT: Status: ACTIVE | Noted: 2017-10-31

## 2022-03-19 PROBLEM — F03.90 DEMENTIA WITHOUT BEHAVIORAL DISTURBANCE (HCC): Status: ACTIVE | Noted: 2018-08-13

## 2022-05-02 ENCOUNTER — APPOINTMENT (OUTPATIENT)
Dept: GENERAL RADIOLOGY | Age: 82
End: 2022-05-02
Attending: EMERGENCY MEDICINE
Payer: MEDICARE

## 2022-05-02 ENCOUNTER — HOSPITAL ENCOUNTER (EMERGENCY)
Age: 82
Discharge: HOME | End: 2022-05-03
Attending: EMERGENCY MEDICINE
Payer: MEDICARE

## 2022-05-02 ENCOUNTER — APPOINTMENT (OUTPATIENT)
Dept: CT IMAGING | Age: 82
End: 2022-05-02
Attending: EMERGENCY MEDICINE
Payer: MEDICARE

## 2022-05-02 DIAGNOSIS — N39.0 URINARY TRACT INFECTION WITHOUT HEMATURIA, SITE UNSPECIFIED: Primary | ICD-10-CM

## 2022-05-02 LAB
ALBUMIN SERPL-MCNC: 4.2 G/DL (ref 3.5–5)
ALBUMIN/GLOB SERPL: 1.4 {RATIO} (ref 1.1–2.2)
ALP SERPL-CCNC: 77 U/L (ref 45–117)
ALT SERPL-CCNC: 22 U/L (ref 12–78)
ANION GAP SERPL CALC-SCNC: 12 MMOL/L (ref 5–15)
AST SERPL-CCNC: 32 U/L (ref 15–37)
BASOPHILS # BLD: 0 K/UL (ref 0–0.1)
BASOPHILS NFR BLD: 1 % (ref 0–1)
BILIRUB SERPL-MCNC: 0.5 MG/DL (ref 0.2–1)
BUN SERPL-MCNC: 48 MG/DL (ref 6–20)
BUN/CREAT SERPL: 12 (ref 12–20)
CALCIUM SERPL-MCNC: 9 MG/DL (ref 8.5–10.1)
CHLORIDE SERPL-SCNC: 95 MMOL/L (ref 97–108)
CO2 SERPL-SCNC: 24 MMOL/L (ref 21–32)
CREAT SERPL-MCNC: 3.97 MG/DL (ref 0.55–1.02)
DIFFERENTIAL METHOD BLD: ABNORMAL
EOSINOPHIL # BLD: 0 K/UL (ref 0–0.4)
EOSINOPHIL NFR BLD: 0 % (ref 0–7)
ERYTHROCYTE [DISTWIDTH] IN BLOOD BY AUTOMATED COUNT: 12.2 % (ref 11.5–14.5)
GLOBULIN SER CALC-MCNC: 2.9 G/DL (ref 2–4)
GLUCOSE SERPL-MCNC: 118 MG/DL (ref 65–100)
HCT VFR BLD AUTO: 36 % (ref 35–47)
HGB BLD-MCNC: 12.4 G/DL (ref 11.5–16)
IMM GRANULOCYTES # BLD AUTO: 0 K/UL (ref 0–0.04)
IMM GRANULOCYTES NFR BLD AUTO: 0 % (ref 0–0.5)
LYMPHOCYTES # BLD: 1 K/UL (ref 0.8–3.5)
LYMPHOCYTES NFR BLD: 13 % (ref 12–49)
MCH RBC QN AUTO: 31.6 PG (ref 26–34)
MCHC RBC AUTO-ENTMCNC: 34.4 G/DL (ref 30–36.5)
MCV RBC AUTO: 91.6 FL (ref 80–99)
MONOCYTES # BLD: 0.6 K/UL (ref 0–1)
MONOCYTES NFR BLD: 7 % (ref 5–13)
NEUTS SEG # BLD: 6.1 K/UL (ref 1.8–8)
NEUTS SEG NFR BLD: 79 % (ref 32–75)
NRBC # BLD: 0 K/UL (ref 0–0.01)
NRBC BLD-RTO: 0 PER 100 WBC
PLATELET # BLD AUTO: 212 K/UL (ref 150–400)
PMV BLD AUTO: 9.8 FL (ref 8.9–12.9)
POTASSIUM SERPL-SCNC: 4.4 MMOL/L (ref 3.5–5.1)
PROT SERPL-MCNC: 7.1 G/DL (ref 6.4–8.2)
RBC # BLD AUTO: 3.93 M/UL (ref 3.8–5.2)
SODIUM SERPL-SCNC: 131 MMOL/L (ref 136–145)
WBC # BLD AUTO: 7.8 K/UL (ref 3.6–11)

## 2022-05-02 PROCEDURE — 85025 COMPLETE CBC W/AUTO DIFF WBC: CPT

## 2022-05-02 PROCEDURE — 36415 COLL VENOUS BLD VENIPUNCTURE: CPT

## 2022-05-02 PROCEDURE — 71045 X-RAY EXAM CHEST 1 VIEW: CPT

## 2022-05-02 PROCEDURE — 81001 URINALYSIS AUTO W/SCOPE: CPT

## 2022-05-02 PROCEDURE — 72125 CT NECK SPINE W/O DYE: CPT

## 2022-05-02 PROCEDURE — 72170 X-RAY EXAM OF PELVIS: CPT

## 2022-05-02 PROCEDURE — 80053 COMPREHEN METABOLIC PANEL: CPT

## 2022-05-02 PROCEDURE — 70450 CT HEAD/BRAIN W/O DYE: CPT

## 2022-05-02 PROCEDURE — 99284 EMERGENCY DEPT VISIT MOD MDM: CPT

## 2022-05-02 RX ORDER — RISPERIDONE 1 MG/1
1 TABLET, FILM COATED ORAL DAILY
COMMUNITY

## 2022-05-02 RX ORDER — AMLODIPINE BESYLATE 5 MG/1
5 TABLET ORAL DAILY
COMMUNITY

## 2022-05-02 RX ORDER — SERTRALINE HYDROCHLORIDE 25 MG/1
25 TABLET, FILM COATED ORAL DAILY
COMMUNITY

## 2022-05-02 RX ORDER — QUETIAPINE FUMARATE 25 MG/1
25 TABLET, FILM COATED ORAL
COMMUNITY

## 2022-05-02 RX ORDER — ACETAMINOPHEN 325 MG/1
650 TABLET ORAL
COMMUNITY

## 2022-05-03 VITALS
DIASTOLIC BLOOD PRESSURE: 61 MMHG | BODY MASS INDEX: 24.88 KG/M2 | SYSTOLIC BLOOD PRESSURE: 133 MMHG | OXYGEN SATURATION: 94 % | HEART RATE: 78 BPM | WEIGHT: 140.43 LBS | TEMPERATURE: 97.7 F | RESPIRATION RATE: 16 BRPM

## 2022-05-03 LAB
APPEARANCE UR: ABNORMAL
BACTERIA URNS QL MICRO: ABNORMAL /HPF
BILIRUB UR QL CFM: NEGATIVE
COLOR UR: YELLOW
EPITH CASTS URNS QL MICRO: ABNORMAL /LPF
GLUCOSE UR STRIP.AUTO-MCNC: NEGATIVE MG/DL
HGB UR QL STRIP: NEGATIVE
KETONES UR QL STRIP.AUTO: NEGATIVE MG/DL
LEUKOCYTE ESTERASE UR QL STRIP.AUTO: NEGATIVE
NITRITE UR QL STRIP.AUTO: NEGATIVE
PH UR STRIP: 5 [PH] (ref 5–8)
PROT UR STRIP-MCNC: NEGATIVE MG/DL
RBC #/AREA URNS HPF: ABNORMAL /HPF (ref 0–5)
SP GR UR REFRACTOMETRY: >1.03 (ref 1–1.03)
UR CULT HOLD, URHOLD: NORMAL
UROBILINOGEN UR QL STRIP.AUTO: 0.2 EU/DL (ref 0.2–1)
WBC URNS QL MICRO: ABNORMAL /HPF (ref 0–4)

## 2022-05-03 PROCEDURE — 87086 URINE CULTURE/COLONY COUNT: CPT

## 2022-05-03 PROCEDURE — 74011250637 HC RX REV CODE- 250/637: Performed by: EMERGENCY MEDICINE

## 2022-05-03 RX ORDER — NITROFURANTOIN 25; 75 MG/1; MG/1
100 CAPSULE ORAL
Status: COMPLETED | OUTPATIENT
Start: 2022-05-03 | End: 2022-05-03

## 2022-05-03 RX ORDER — NITROFURANTOIN 25; 75 MG/1; MG/1
100 CAPSULE ORAL 2 TIMES DAILY
Qty: 6 CAPSULE | Refills: 0 | Status: SHIPPED | OUTPATIENT
Start: 2022-05-03 | End: 2022-05-06

## 2022-05-03 RX ADMIN — NITROFURANTOIN (MONOHYDRATE/MACROCRYSTALS) 100 MG: 75; 25 CAPSULE ORAL at 01:20

## 2022-05-03 NOTE — ED NOTES
Return report to [address] given to [facility].

## 2022-05-03 NOTE — ED TRIAGE NOTES
Pt sent by [facility] after being found at [facility] on the floor net to the bed; no injuries noted. However, staff claims that pt seems slightly confused during their 10AM rounds today.

## 2022-05-03 NOTE — ED PROVIDER NOTES
67-year-old female with a history of dementia presents with chief complaint of altered mental status. Staff at the patient's facility noted that she seemed somewhat confused this morning at 10 AM, more than her baseline. This evening they found her laying on the floor next to her bed at approximately 10 PM.  They felt that she may have been favoring her right hip.   Additional history and review of systems is limited by dementia           Past Medical History:   Diagnosis Date    Acute meniscal tear of knee     Bulging disc     DJD (degenerative joint disease)     l knee    GERD (gastroesophageal reflux disease)     HTN     Hypothyroid     OA (osteoarthritis)     Osteoporosis     Spinal stenosis        Past Surgical History:   Procedure Laterality Date    COLONOSCOPY       HX HYSTERECTOMY  1979    HX ORTHOPAEDIC      left knee 2008    IR DILATE ESOPH STRICT      2004    NEUROLOGICAL PROCEDURE UNLISTED  2013    [place] BREAST SURGERY PROCEDURE UNLISTED      1968 implants    STRESS TEST - GXT  2005    neg         Family History:   Problem Relation Age of Onset    Cancer Father     Heart Attack Brother     Stroke Brother     Coronary Art Dis Brother        Social History     Socioeconomic History    Marital status:      Spouse name: Not on file    Number of children: Not on file    Years of education: Not on file    Highest education level: Not on file   Occupational History    Not on file   Tobacco Use    Smoking status: Never Smoker    Smokeless tobacco: Never Used   Substance and Sexual Activity    Alcohol use: Yes     Comment: occas    Drug use: No    Sexual activity: Not on file   Other Topics Concern    Not on file   Social History Narrative    Not on file     Social Determinants of Health     Financial Resource Strain:     Difficulty of Paying Living Expenses: Not on file   Food Insecurity:     Worried About Running Out of Food in the Last Year: Not on file    [place] of Food in the Last Year: Not on file   Transportation Needs:     Lack of Transportation (Medical): Not on file    Lack of Transportation (Non-Medical): Not on file   Physical Activity:     Days of Exercise per Week: Not on file    Minutes of Exercise per Session: Not on file   Stress:     Feeling of Stress : Not on file   Social Connections:     Frequency of Communication with Friends and Family: Not on file    Frequency of Social Gatherings with Friends and Family: Not on file    Attends Zoroastrian Services: Not on file    Active Member of [address] [place] [address] or Organizations: Not on file    Attends Club or Organization Meetings: Not on file    Marital Status: Not on file   Intimate Partner Violence:     Fear of Current or Ex-Partner: Not on file    Emotionally Abused: Not on file    Physically Abused: Not on file    Sexually Abused: Not on file   Housing Stability:     Unable to Pay for Housing in the Last Year: Not on file    Number of [place] in the Last Year: Not on file    Unstable Housing in the Last Year: Not on file         ALLERGIES: Ace inhibitors, Ambien [zolpidem], Keflex [cephalexin], and Levaquin [levofloxacin]    Review of Systems   Unable to perform ROS: Dementia       There were no vitals filed for this visit. Physical Exam  Vitals and nursing note reviewed. Constitutional:       General: She is not in acute distress. Appearance: Normal appearance. She is well-developed. She is not ill-appearing, toxic-appearing or diaphoretic. HENT:      Head: Normocephalic and atraumatic. Eyes:      Extraocular Movements: Extraocular movements intact. Cardiovascular:      Rate and Rhythm: Normal rate. Pulses: Normal pulses. Heart sounds: Normal heart sounds. Pulmonary:      Effort: Pulmonary effort is normal. No respiratory distress. Breath sounds: Normal breath sounds. Abdominal:      General: Bowel sounds are normal. There is no distension. Tenderness:  There is no abdominal tenderness. Musculoskeletal:         General: Normal range of motion. Cervical back: Normal range of motion. Comments: No pain with internal or external rotation of the hips bilaterally. Skin:     General: Skin is dry. Neurological:      Mental Status: She is alert. MDM  Number of Diagnoses or Management Options  Urinary tract infection without hematuria, site unspecified  Diagnosis management comments:     Plain film and CT imaging obtained to rule out traumatic injury. Imaging unremarkable. Patient has +1 bacteria and slight pyuria. We will treat for urinary tract infection given recent confusion. Patient will be returned to her nursing facility.          Procedures

## 2022-05-03 NOTE — DISCHARGE INSTRUCTIONS
Thank you for allowing us to provide you with medical care today. We realize that you have many choices for your emergency care needs. We thank you for choosing [facility]. Please choose us in the future for any continued health care needs. The exam and treatment you received in the Emergency Department were for an emergent problem and are not intended as complete care. It is important that you follow up with a doctor, nurse practitioner, or physician's assistant for ongoing care. If your symptoms worsen or you do not improve as expected and you are unable to reach your usual health care provider, you should return to the Emergency Department. We are available 24 hours a day. Please make an appointment with your health care provider(s) for follow up of your Emergency Department visit. Take this sheet with you when you go to your follow-up visit.

## 2022-05-03 NOTE — ROUTINE PROCESS
I have reviewed discharge instructions with the caregiver. The caregiver verbalized understanding. Sridhar Najera, medical staff at [facility] took report.

## 2022-05-04 LAB
BACTERIA SPEC CULT: NORMAL
SERVICE CMNT-IMP: NORMAL

## 2022-06-23 ENCOUNTER — APPOINTMENT (OUTPATIENT)
Dept: CT IMAGING | Age: 82
DRG: 563 | End: 2022-06-23
Attending: EMERGENCY MEDICINE
Payer: MEDICARE

## 2022-06-23 ENCOUNTER — HOSPITAL ENCOUNTER (INPATIENT)
Age: 82
LOS: 4 days | Discharge: NURSING, REHAB OR LONG-TERM CARE | DRG: 563 | End: 2022-06-28
Attending: EMERGENCY MEDICINE | Admitting: HOSPITALIST
Payer: MEDICARE

## 2022-06-23 ENCOUNTER — APPOINTMENT (OUTPATIENT)
Dept: GENERAL RADIOLOGY | Age: 82
DRG: 563 | End: 2022-06-23
Attending: EMERGENCY MEDICINE
Payer: MEDICARE

## 2022-06-23 DIAGNOSIS — S62.335A CLOSED DISPLACED FRACTURE OF NECK OF FOURTH METACARPAL BONE OF LEFT HAND, INITIAL ENCOUNTER: ICD-10-CM

## 2022-06-23 DIAGNOSIS — S62.337A CLOSED DISPLACED FRACTURE OF NECK OF FIFTH METACARPAL BONE OF LEFT HAND, INITIAL ENCOUNTER: ICD-10-CM

## 2022-06-23 DIAGNOSIS — E87.6 HYPOKALEMIA: Primary | ICD-10-CM

## 2022-06-23 LAB
ALBUMIN SERPL-MCNC: 3.5 G/DL (ref 3.5–5)
ALBUMIN/GLOB SERPL: 1.2 {RATIO} (ref 1.1–2.2)
ALP SERPL-CCNC: 60 U/L (ref 45–117)
ALT SERPL-CCNC: 20 U/L (ref 12–78)
ANION GAP SERPL CALC-SCNC: 12 MMOL/L (ref 5–15)
AST SERPL-CCNC: 26 U/L (ref 15–37)
BASOPHILS # BLD: 0 K/UL (ref 0–0.1)
BASOPHILS NFR BLD: 1 % (ref 0–1)
BILIRUB SERPL-MCNC: 0.8 MG/DL (ref 0.2–1)
BUN SERPL-MCNC: 20 MG/DL (ref 6–20)
BUN/CREAT SERPL: 28 (ref 12–20)
CALCIUM SERPL-MCNC: 9.1 MG/DL (ref 8.5–10.1)
CHLORIDE SERPL-SCNC: 96 MMOL/L (ref 97–108)
CO2 SERPL-SCNC: 27 MMOL/L (ref 21–32)
COMMENT, HOLDF: NORMAL
CREAT SERPL-MCNC: 0.72 MG/DL (ref 0.55–1.02)
DIFFERENTIAL METHOD BLD: ABNORMAL
EOSINOPHIL # BLD: 0 K/UL (ref 0–0.4)
EOSINOPHIL NFR BLD: 0 % (ref 0–7)
ERYTHROCYTE [DISTWIDTH] IN BLOOD BY AUTOMATED COUNT: 11.9 % (ref 11.5–14.5)
GLOBULIN SER CALC-MCNC: 3 G/DL (ref 2–4)
GLUCOSE SERPL-MCNC: 90 MG/DL (ref 65–100)
HCT VFR BLD AUTO: 31.8 % (ref 35–47)
HGB BLD-MCNC: 11.2 G/DL (ref 11.5–16)
IMM GRANULOCYTES # BLD AUTO: 0 K/UL (ref 0–0.04)
IMM GRANULOCYTES NFR BLD AUTO: 1 % (ref 0–0.5)
LYMPHOCYTES # BLD: 1.3 K/UL (ref 0.8–3.5)
LYMPHOCYTES NFR BLD: 15 % (ref 12–49)
MCH RBC QN AUTO: 32.4 PG (ref 26–34)
MCHC RBC AUTO-ENTMCNC: 35.2 G/DL (ref 30–36.5)
MCV RBC AUTO: 91.9 FL (ref 80–99)
MONOCYTES # BLD: 0.7 K/UL (ref 0–1)
MONOCYTES NFR BLD: 8 % (ref 5–13)
NEUTS SEG # BLD: 6.5 K/UL (ref 1.8–8)
NEUTS SEG NFR BLD: 75 % (ref 32–75)
NRBC # BLD: 0 K/UL (ref 0–0.01)
NRBC BLD-RTO: 0 PER 100 WBC
PLATELET # BLD AUTO: 302 K/UL (ref 150–400)
PMV BLD AUTO: 10 FL (ref 8.9–12.9)
POTASSIUM SERPL-SCNC: 3 MMOL/L (ref 3.5–5.1)
PROT SERPL-MCNC: 6.5 G/DL (ref 6.4–8.2)
RBC # BLD AUTO: 3.46 M/UL (ref 3.8–5.2)
SAMPLES BEING HELD,HOLD: NORMAL
SODIUM SERPL-SCNC: 135 MMOL/L (ref 136–145)
TROPONIN-HIGH SENSITIVITY: 13 NG/L (ref 0–51)
WBC # BLD AUTO: 8.5 K/UL (ref 3.6–11)

## 2022-06-23 PROCEDURE — 84484 ASSAY OF TROPONIN QUANT: CPT

## 2022-06-23 PROCEDURE — 96361 HYDRATE IV INFUSION ADD-ON: CPT

## 2022-06-23 PROCEDURE — 36415 COLL VENOUS BLD VENIPUNCTURE: CPT

## 2022-06-23 PROCEDURE — 74011250636 HC RX REV CODE- 250/636: Performed by: EMERGENCY MEDICINE

## 2022-06-23 PROCEDURE — 74176 CT ABD & PELVIS W/O CONTRAST: CPT

## 2022-06-23 PROCEDURE — 70450 CT HEAD/BRAIN W/O DYE: CPT

## 2022-06-23 PROCEDURE — 99285 EMERGENCY DEPT VISIT HI MDM: CPT

## 2022-06-23 PROCEDURE — 96366 THER/PROPH/DIAG IV INF ADDON: CPT

## 2022-06-23 PROCEDURE — 80053 COMPREHEN METABOLIC PANEL: CPT

## 2022-06-23 PROCEDURE — 72125 CT NECK SPINE W/O DYE: CPT

## 2022-06-23 PROCEDURE — 74011000250 HC RX REV CODE- 250: Performed by: EMERGENCY MEDICINE

## 2022-06-23 PROCEDURE — 93005 ELECTROCARDIOGRAM TRACING: CPT

## 2022-06-23 PROCEDURE — 85025 COMPLETE CBC W/AUTO DIFF WBC: CPT

## 2022-06-23 PROCEDURE — 71250 CT THORAX DX C-: CPT

## 2022-06-23 PROCEDURE — 75810000301 HC ER LEVEL 1 CLOSED TREATMNT FRACTURE/DISLOCATION

## 2022-06-23 PROCEDURE — 96365 THER/PROPH/DIAG IV INF INIT: CPT

## 2022-06-23 PROCEDURE — 73130 X-RAY EXAM OF HAND: CPT

## 2022-06-23 RX ORDER — POTASSIUM CHLORIDE 750 MG/1
20 TABLET, FILM COATED, EXTENDED RELEASE ORAL
Status: DISCONTINUED | OUTPATIENT
Start: 2022-06-23 | End: 2022-06-23

## 2022-06-23 RX ORDER — LIDOCAINE HYDROCHLORIDE 10 MG/ML
10 INJECTION INFILTRATION; PERINEURAL
Status: COMPLETED | OUTPATIENT
Start: 2022-06-23 | End: 2022-06-23

## 2022-06-23 RX ORDER — POTASSIUM CHLORIDE 7.45 MG/ML
10 INJECTION INTRAVENOUS
Status: COMPLETED | OUTPATIENT
Start: 2022-06-23 | End: 2022-06-24

## 2022-06-23 RX ADMIN — LIDOCAINE HYDROCHLORIDE 10 ML: 10 INJECTION, SOLUTION INFILTRATION; PERINEURAL at 23:00

## 2022-06-23 RX ADMIN — SODIUM CHLORIDE 500 ML: 9 INJECTION, SOLUTION INTRAVENOUS at 21:36

## 2022-06-23 RX ADMIN — POTASSIUM CHLORIDE 10 MEQ: 7.46 INJECTION, SOLUTION INTRAVENOUS at 23:35

## 2022-06-23 RX ADMIN — POTASSIUM CHLORIDE 10 MEQ: 7.46 INJECTION, SOLUTION INTRAVENOUS at 22:24

## 2022-06-24 ENCOUNTER — APPOINTMENT (OUTPATIENT)
Dept: GENERAL RADIOLOGY | Age: 82
DRG: 563 | End: 2022-06-24
Attending: EMERGENCY MEDICINE
Payer: MEDICARE

## 2022-06-24 PROBLEM — E87.6 HYPOKALEMIA: Status: ACTIVE | Noted: 2022-06-24

## 2022-06-24 LAB
ANION GAP SERPL CALC-SCNC: 8 MMOL/L (ref 5–15)
APPEARANCE UR: CLEAR
ATRIAL RATE: 100 BPM
ATRIAL RATE: 100 BPM
BACTERIA URNS QL MICRO: NEGATIVE /HPF
BILIRUB UR QL: NEGATIVE
BUN SERPL-MCNC: 16 MG/DL (ref 6–20)
BUN/CREAT SERPL: 28 (ref 12–20)
CALCIUM SERPL-MCNC: 8 MG/DL (ref 8.5–10.1)
CALCULATED P AXIS, ECG09: 33 DEGREES
CALCULATED P AXIS, ECG09: 40 DEGREES
CALCULATED R AXIS, ECG10: 10 DEGREES
CALCULATED R AXIS, ECG10: 17 DEGREES
CALCULATED T AXIS, ECG11: 47 DEGREES
CALCULATED T AXIS, ECG11: 47 DEGREES
CHLORIDE SERPL-SCNC: 99 MMOL/L (ref 97–108)
CK SERPL-CCNC: 142 U/L (ref 26–192)
CO2 SERPL-SCNC: 27 MMOL/L (ref 21–32)
COLOR UR: ABNORMAL
COVID-19 RAPID TEST, COVR: NOT DETECTED
CREAT SERPL-MCNC: 0.58 MG/DL (ref 0.55–1.02)
DIAGNOSIS, 93000: NORMAL
DIAGNOSIS, 93000: NORMAL
EPITH CASTS URNS QL MICRO: ABNORMAL /LPF
GLUCOSE SERPL-MCNC: 102 MG/DL (ref 65–100)
GLUCOSE UR STRIP.AUTO-MCNC: NEGATIVE MG/DL
HGB UR QL STRIP: NEGATIVE
KETONES UR QL STRIP.AUTO: 40 MG/DL
LEUKOCYTE ESTERASE UR QL STRIP.AUTO: ABNORMAL
NITRITE UR QL STRIP.AUTO: NEGATIVE
P-R INTERVAL, ECG05: 136 MS
P-R INTERVAL, ECG05: 138 MS
PH UR STRIP: 6 [PH] (ref 5–8)
POTASSIUM SERPL-SCNC: 3.1 MMOL/L (ref 3.5–5.1)
PROT UR STRIP-MCNC: NEGATIVE MG/DL
Q-T INTERVAL, ECG07: 292 MS
Q-T INTERVAL, ECG07: 322 MS
QRS DURATION, ECG06: 66 MS
QRS DURATION, ECG06: 72 MS
QTC CALCULATION (BEZET), ECG08: 376 MS
QTC CALCULATION (BEZET), ECG08: 415 MS
RBC #/AREA URNS HPF: ABNORMAL /HPF (ref 0–5)
SODIUM SERPL-SCNC: 134 MMOL/L (ref 136–145)
SOURCE, COVRS: NORMAL
SP GR UR REFRACTOMETRY: 1.02 (ref 1–1.03)
TROPONIN-HIGH SENSITIVITY: 14 NG/L (ref 0–51)
UR CULT HOLD, URHOLD: NORMAL
UROBILINOGEN UR QL STRIP.AUTO: 0.2 EU/DL (ref 0.2–1)
VENTRICULAR RATE, ECG03: 100 BPM
VENTRICULAR RATE, ECG03: 100 BPM
WBC URNS QL MICRO: ABNORMAL /HPF (ref 0–4)

## 2022-06-24 PROCEDURE — 87086 URINE CULTURE/COLONY COUNT: CPT

## 2022-06-24 PROCEDURE — 82550 ASSAY OF CK (CPK): CPT

## 2022-06-24 PROCEDURE — 87635 SARS-COV-2 COVID-19 AMP PRB: CPT

## 2022-06-24 PROCEDURE — 74011250636 HC RX REV CODE- 250/636: Performed by: EMERGENCY MEDICINE

## 2022-06-24 PROCEDURE — 73120 X-RAY EXAM OF HAND: CPT

## 2022-06-24 PROCEDURE — 80048 BASIC METABOLIC PNL TOTAL CA: CPT

## 2022-06-24 PROCEDURE — 84484 ASSAY OF TROPONIN QUANT: CPT

## 2022-06-24 PROCEDURE — 94760 N-INVAS EAR/PLS OXIMETRY 1: CPT

## 2022-06-24 PROCEDURE — 74011250637 HC RX REV CODE- 250/637: Performed by: FAMILY MEDICINE

## 2022-06-24 PROCEDURE — 96367 TX/PROPH/DG ADDL SEQ IV INF: CPT

## 2022-06-24 PROCEDURE — 73130 X-RAY EXAM OF HAND: CPT

## 2022-06-24 PROCEDURE — 65270000046 HC RM TELEMETRY

## 2022-06-24 PROCEDURE — 96366 THER/PROPH/DIAG IV INF ADDON: CPT

## 2022-06-24 PROCEDURE — 36415 COLL VENOUS BLD VENIPUNCTURE: CPT

## 2022-06-24 PROCEDURE — 81001 URINALYSIS AUTO W/SCOPE: CPT

## 2022-06-24 PROCEDURE — 74011000250 HC RX REV CODE- 250: Performed by: FAMILY MEDICINE

## 2022-06-24 PROCEDURE — 74011250636 HC RX REV CODE- 250/636: Performed by: FAMILY MEDICINE

## 2022-06-24 PROCEDURE — 93005 ELECTROCARDIOGRAM TRACING: CPT

## 2022-06-24 RX ORDER — CLINDAMYCIN PHOSPHATE 300 MG/50ML
300 INJECTION INTRAVENOUS
Status: COMPLETED | OUTPATIENT
Start: 2022-06-24 | End: 2022-06-24

## 2022-06-24 RX ORDER — SERTRALINE HYDROCHLORIDE 50 MG/1
25 TABLET, FILM COATED ORAL DAILY
Status: DISCONTINUED | OUTPATIENT
Start: 2022-06-25 | End: 2022-06-28 | Stop reason: HOSPADM

## 2022-06-24 RX ORDER — SODIUM CHLORIDE AND POTASSIUM CHLORIDE .9; .15 G/100ML; G/100ML
SOLUTION INTRAVENOUS CONTINUOUS
Status: DISCONTINUED | OUTPATIENT
Start: 2022-06-24 | End: 2022-06-26

## 2022-06-24 RX ORDER — QUETIAPINE FUMARATE 25 MG/1
25 TABLET, FILM COATED ORAL
Status: DISCONTINUED | OUTPATIENT
Start: 2022-06-24 | End: 2022-06-28 | Stop reason: HOSPADM

## 2022-06-24 RX ORDER — SODIUM CHLORIDE 0.9 % (FLUSH) 0.9 %
5-40 SYRINGE (ML) INJECTION AS NEEDED
Status: DISCONTINUED | OUTPATIENT
Start: 2022-06-24 | End: 2022-06-28 | Stop reason: HOSPADM

## 2022-06-24 RX ORDER — SODIUM CHLORIDE 0.9 % (FLUSH) 0.9 %
5-40 SYRINGE (ML) INJECTION EVERY 8 HOURS
Status: DISCONTINUED | OUTPATIENT
Start: 2022-06-24 | End: 2022-06-28 | Stop reason: HOSPADM

## 2022-06-24 RX ORDER — LEVOTHYROXINE SODIUM 150 UG/1
150 TABLET ORAL
Status: DISCONTINUED | OUTPATIENT
Start: 2022-06-25 | End: 2022-06-28 | Stop reason: HOSPADM

## 2022-06-24 RX ORDER — ACETAMINOPHEN 325 MG/1
650 TABLET ORAL
Status: DISCONTINUED | OUTPATIENT
Start: 2022-06-24 | End: 2022-06-28 | Stop reason: HOSPADM

## 2022-06-24 RX ORDER — DONEPEZIL HYDROCHLORIDE 10 MG/1
10 TABLET, FILM COATED ORAL DAILY
Status: DISCONTINUED | OUTPATIENT
Start: 2022-06-25 | End: 2022-06-28 | Stop reason: HOSPADM

## 2022-06-24 RX ORDER — AMLODIPINE BESYLATE 5 MG/1
5 TABLET ORAL DAILY
Status: DISCONTINUED | OUTPATIENT
Start: 2022-06-25 | End: 2022-06-28 | Stop reason: HOSPADM

## 2022-06-24 RX ORDER — RISPERIDONE 0.5 MG/1
1 TABLET, FILM COATED ORAL DAILY
Status: DISCONTINUED | OUTPATIENT
Start: 2022-06-25 | End: 2022-06-28 | Stop reason: HOSPADM

## 2022-06-24 RX ORDER — POTASSIUM CHLORIDE 750 MG/1
20 TABLET, FILM COATED, EXTENDED RELEASE ORAL
Status: COMPLETED | OUTPATIENT
Start: 2022-06-24 | End: 2022-06-24

## 2022-06-24 RX ADMIN — CLINDAMYCIN PHOSPHATE 300 MG: 300 INJECTION, SOLUTION INTRAVENOUS at 04:06

## 2022-06-24 RX ADMIN — POTASSIUM CHLORIDE 20 MEQ: 750 TABLET, FILM COATED, EXTENDED RELEASE ORAL at 16:40

## 2022-06-24 RX ADMIN — SODIUM CHLORIDE, PRESERVATIVE FREE 10 ML: 5 INJECTION INTRAVENOUS at 16:40

## 2022-06-24 RX ADMIN — POTASSIUM CHLORIDE AND SODIUM CHLORIDE: 900; 150 INJECTION, SOLUTION INTRAVENOUS at 17:08

## 2022-06-24 RX ADMIN — POTASSIUM CHLORIDE 10 MEQ: 7.46 INJECTION, SOLUTION INTRAVENOUS at 00:50

## 2022-06-24 RX ADMIN — SODIUM CHLORIDE, PRESERVATIVE FREE 10 ML: 5 INJECTION INTRAVENOUS at 22:18

## 2022-06-24 RX ADMIN — POTASSIUM CHLORIDE 10 MEQ: 7.46 INJECTION, SOLUTION INTRAVENOUS at 02:03

## 2022-06-24 RX ADMIN — QUETIAPINE FUMARATE 25 MG: 25 TABLET ORAL at 22:19

## 2022-06-24 NOTE — ED PROVIDER NOTES
Thu Jun 23, 2022 2043 Spoke with Florentino Green, the patient's daughter and first point of contact. Ms. Girish Moreno describes that she was called and informed by the nursing home that the patient had fallen, busted her lip, hit herself on the chin, and had some swelling in her hand. The daughter notes that for the past 2 months the patient's dementia has rapidly progressed. Her daughter describes her as overall positive and cooperative but sometimes she can be stubborn. She states that the [facility] that she spoke with today is new staff and states that this person described to her that the patient was not coherent. They also described low blood pressures and a high heart rate. Daughter confirms status as a DNR. Spoke with Staff at [facility] who report being not as familiar with patient but concerns for unwitness fall, with hand deformity, lip laceration, tachycardia, and hypotension. The history is provided by medical records, the EMS personnel and the patient. The history is limited by the condition of the patient. Altered mental status   This is a new problem.         Past Medical History:   Diagnosis Date    Acute meniscal tear of knee     Alzheimer disease ([address]     Bulging disc     Dementia (HCC)     DJD (degenerative joint disease)     l knee    GERD (gastroesophageal reflux disease)     HTN     Hypothyroid     OA (osteoarthritis)     Osteoporosis     Spinal stenosis        Past Surgical History:   Procedure Laterality Date    COLONOSCOPY       HX HYSTERECTOMY  1979    HX ORTHOPAEDIC      left knee 2008    IR DILATE ESOPH STRICT      2004    NEUROLOGICAL PROCEDURE UNLISTED  2013    RI BREAST SURGERY PROCEDURE UNLISTED      1968 implants    STRESS TEST - GXT  2005    neg         Family History:   Problem Relation Age of Onset    Cancer Father     Heart Attack Brother     Stroke Brother     Coronary Art Dis Brother        Social History Socioeconomic History    Marital status:      Spouse name: Not on file    Number of children: Not on file    Years of education: Not on file    Highest education level: Not on file   Occupational History    Not on file   Tobacco Use    Smoking status: Never Smoker    Smokeless tobacco: Never Used   Substance and Sexual Activity    Alcohol use: Not Currently     Comment: occas    Drug use: No    Sexual activity: Not on file   Other Topics Concern    Not on file   Social History Narrative    Not on file     Social Determinants of Health     Financial Resource Strain:     Difficulty of Paying Living Expenses: Not on file   Food Insecurity:     Worried About Running Out of Food in the Last Year: Not on file    Ambrose of Food in the Last Year: Not on file   Transportation Needs:     Lack of Transportation (Medical): Not on file    Lack of Transportation (Non-Medical):  Not on file   Physical Activity:     Days of Exercise per Week: Not on file    Minutes of Exercise per Session: Not on file   Stress:     Feeling of Stress : Not on file   Social Connections:     Frequency of Communication with Friends and Family: Not on file    Frequency of Social Gatherings with Friends and Family: Not on file    Attends [employer] Services: Not on file    Active Member of [address] [place] [address] [employer] or Organizations: Not on file    Attends Club or Organization Meetings: Not on file    Marital Status: Not on file   Intimate Partner Violence:     Fear of Current or Ex-Partner: Not on file    Emotionally Abused: Not on file    Physically Abused: Not on file    Sexually Abused: Not on file   Housing Stability:     Unable to Pay for Housing in the Last Year: Not on file    Number of [place] in the Last Year: Not on file    Unstable Housing in the Last Year: Not on file         ALLERGIES: Ace inhibitors, Ambien [zolpidem], Keflex [cephalexin], and Levaquin [levofloxacin]    Review of Systems   Unable to perform ROS: Dementia       Vitals:    06/23/22 2020 06/23/22 2021   BP: (!) 123/56    Pulse: 88    Resp: 16    Temp: (!) 96.6 °F (35.9 °C)    SpO2: 99% 99%            Physical Exam  Vitals and nursing note reviewed. Constitutional:       General: She is not in acute distress. Appearance: She is not toxic-appearing. HENT:      Head: Normocephalic. Comments: Lower lip abrasion. No gaping laceration  Eyes:      Extraocular Movements: Extraocular movements intact. Pupils: Pupils are equal, round, and reactive to light. Cardiovascular:      Rate and Rhythm: Normal rate and regular rhythm. Pulmonary:      Effort: Pulmonary effort is normal.      Breath sounds: Normal breath sounds. Musculoskeletal:      Comments: Large contusion and deformity of dorsum of right hand. Neurological:      Mental Status: She is alert. Mental status is at baseline. She is confused. GCS: GCS eye subscore is 4. GCS verbal subscore is 5. GCS motor subscore is 6. Psychiatric:         Behavior: Behavior normal.        Positive for contusion over the lateral aspect of right hand at fifth PIP. DIP tenderness to palpation in the right upper quadrant of the abdomen. Positive blood around the mouth with a small laceration to the lower lip, nonsuturable.       MDM  Number of Diagnoses or Management Options  Closed displaced fracture of neck of fifth metacarpal bone of left hand, initial encounter: new and requires workup  Closed displaced fracture of neck of fourth metacarpal bone of left hand, initial encounter: new and requires workup  Hypokalemia: new and requires workup     Amount and/or Complexity of Data Reviewed  Clinical lab tests: reviewed  Tests in the radiology section of CPT®: reviewed  Tests in the medicine section of CPT®: reviewed  Obtain history from someone other than the patient: yes  Discuss the patient with other providers: yes  Independent visualization of images, tracings, or specimens: yes    Risk of Complications, Morbidity, and/or Mortality  Presenting problems: moderate  Diagnostic procedures: moderate  Management options: moderate    Patient Progress  Patient progress: stable    ED Course as of 06/24/22 0623   Thu Jun 23, 2022 2043 Spoke with Radha Dahl, the patient's daughter and first point of contact. Ms. Indio Davis describes that she was called and informed by the nursing home that the patient had fallen, busted her lip, hit herself on the chin, and had some swelling in her hand. The daughter notes that for the past 2 months the patient's dementia has rapidly progressed. Her daughter describes her as overall positive and cooperative but sometimes she can be stubborn. She states that the [employer] January that she spoke with today is new staff and states that this person described to her that the patient was not coherent. They also described low blood pressures and a high heart rate. Daughter confirms status as a D in ER. Paperwork is at the bedside. [MI]   2047 Twelve-lead EKG reviewed and interpreted by me. Normal sinus rhythm with occasional PVCs. Baseline is irregular from patient motion artifact. Reticular response rate is 100. MI interval 136 ms. QRS duration 72. QT QTC normal.  No acute ST segment elevation or changes. No previous EKG for comparison. [MI]   2767 Discussed with DR. Cassandra Gonzalez via perfect serve. He recommends reduction, ulnar gutter splint, follow up with Dr. Heron Flores. [MI]   Fri Jun 24, 2022   0218 Minimal improvement after second reduction attempt. Will refer to orthopedics for further intervention. [MI]   0529 Potassium(!): 3.1 [MI]   0549 Notified Radha Elizabeths of plan for admission to correct potassium. [MI]   0612 Point heart rate in the 140s. Twelve-lead EKG obtained. Shows a normal sinus rhythm with a ventricular response rate of 100 bpm.  MI interval 138 ms, QRS duration 66 ms. Normal QT QTC. No ST segment depressions or elevations.   No acute findings. [NE]      ED Course User Index  [NE] Seferino Asif MD       Procedures    Perfect Serve Consult for Admission  5:30 AM    ED Room Number: SER06/06  Patient Name and age:  Ranjit Cruz [de-identified] y.o.  female  Working Diagnosis:   1. Hypokalemia    2. Closed displaced fracture of neck of fourth metacarpal bone of left hand, initial encounter    3. Closed displaced fracture of neck of fifth metacarpal bone of left hand, initial encounter        COVID-19 Suspicion:  no  Sepsis present:  no  Reassessment needed: yes  Code Status:  Do Not Resuscitate  Readmission: no  Isolation Requirements:  no  Recommended Level of Care:  med/surg  Department:[place] [facility] - [phone]  Other:  [de-identified] female with severe dementia who resides on the dementia unit presented to our ED with concerns for altered mental status, tachycardia and low blood pressure. EMS clarified that patient was found on floor with clear evidence of a fall and blood on her face from impact to her lip. Event was apparently unwitnessed. Patient was also noted to have a significant bruise, deformity on right hand. Medical screening exam revealed significant hypokalemia to 3.0 with displaced fracture of 4th and 5th metacarpals of right hand. Neurovascularly intact with inadequate alignment of hand fracture/dislocation attempt x2 with digital/hematoma block. Outpatient follow up was arranged with orthopedic surgeon on call. However,  medically significant finding was for hypokalemia to 3.0. Patient is taking poor po and not able to take oral potassium, so she has been kept in ED on monitor and given 40mEq K Riders. Repeat K is 3.1. Requesting observation admission for further correction of potassium with inpatient consult to orthopedics to address displaced metacarpal fracture. Will likely need assessment and possible medical clearance for a surgery. Daughter states dementia has gotten progressively worse over the past two months.   Due to co-morbidities, patient may be more adequately addressed as short term inpatient. Note, Comprehensive trauma work up completed and was negative except for fractures in hand. Orlin Carbajal is patient's Daughter and POA. I have been in regular communication with her. her number is [phone]. LABORATORY TESTS:  Recent Results (from the past 12 hour(s))   EKG, 12 LEAD, INITIAL    Collection Time: 06/23/22  8:45 PM   Result Value Ref Range    Ventricular Rate 100 BPM    Atrial Rate 100 BPM    P-R Interval 136 ms    QRS Duration 72 ms    Q-T Interval 292 ms    QTC Calculation (Bezet) 376 ms    Calculated P Axis 40 degrees    Calculated R Axis 17 degrees    Calculated T Axis 47 degrees    Diagnosis       Sinus rhythm with premature supraventricular complexes  Septal infarct (cited on or before 16-SEP-2013)  Abnormal ECG  When compared with ECG of 16-SEP-2013 15:11,  premature supraventricular complexes are now present  Questionable change in initial forces of Septal leads  Nonspecific T wave abnormality now evident in Lateral leads     CBC WITH AUTOMATED DIFF    Collection Time: 06/23/22  8:54 PM   Result Value Ref Range    WBC 8.5 3.6 - 11.0 K/uL    RBC 3.46 (L) 3.80 - 5.20 M/uL    HGB 11.2 (L) 11.5 - 16.0 g/dL    HCT 31.8 (L) 35.0 - 47.0 %    MCV 91.9 80.0 - 99.0 FL    MCH 32.4 26.0 - 34.0 PG    MCHC 35.2 30.0 - 36.5 g/dL    RDW 11.9 11.5 - 14.5 %    PLATELET 013 950 - 373 K/uL    MPV 10.0 8.9 - 12.9 FL    NRBC 0.0 0  WBC    ABSOLUTE NRBC 0.00 0.00 - 0.01 K/uL    NEUTROPHILS 75 32 - 75 %    LYMPHOCYTES 15 12 - 49 %    MONOCYTES 8 5 - 13 %    EOSINOPHILS 0 0 - 7 %    BASOPHILS 1 0 - 1 %    IMMATURE GRANULOCYTES 1 (H) 0.0 - 0.5 %    ABS. NEUTROPHILS 6.5 1.8 - 8.0 K/UL    ABS. LYMPHOCYTES 1.3 0.8 - 3.5 K/UL    ABS. MONOCYTES 0.7 0.0 - 1.0 K/UL    ABS. EOSINOPHILS 0.0 0.0 - 0.4 K/UL    ABS. BASOPHILS 0.0 0.0 - 0.1 K/UL    ABS. IMM.  GRANS. 0.0 0.00 - 0.04 K/UL    DF AUTOMATED     METABOLIC PANEL, COMPREHENSIVE Collection Time: 06/23/22  8:54 PM   Result Value Ref Range    Sodium 135 (L) 136 - 145 mmol/L    Potassium 3.0 (L) 3.5 - 5.1 mmol/L    Chloride 96 (L) 97 - 108 mmol/L    CO2 27 21 - 32 mmol/L    Anion gap 12 5 - 15 mmol/L    Glucose 90 65 - 100 mg/dL    BUN 20 6 - 20 MG/DL    Creatinine 0.72 0.55 - 1.02 MG/DL    BUN/Creatinine ratio 28 (H) 12 - 20      GFR est AA >60 >60 ml/min/1.73m2    GFR est non-AA >60 >60 ml/min/1.73m2    Calcium 9.1 8.5 - 10.1 MG/DL    Bilirubin, total 0.8 0.2 - 1.0 MG/DL    ALT (SGPT) 20 12 - 78 U/L    AST (SGOT) 26 15 - 37 U/L    Alk. phosphatase 60 45 - 117 U/L    Protein, total 6.5 6.4 - 8.2 g/dL    Albumin 3.5 3.5 - 5.0 g/dL    Globulin 3.0 2.0 - 4.0 g/dL    A-G Ratio 1.2 1.1 - 2.2     TROPONIN-HIGH SENSITIVITY    Collection Time: 06/23/22  8:54 PM   Result Value Ref Range    Troponin-High Sensitivity 13 0 - 51 ng/L   SAMPLES BEING HELD    Collection Time: 06/23/22  8:54 PM   Result Value Ref Range    SAMPLES BEING HELD 1 RED, 1 BLUE, 1 GREY, 1 DK GREEN     COMMENT        Add-on orders for these samples will be processed based on acceptable specimen integrity and analyte stability, which may vary by analyte. URINALYSIS W/MICROSCOPIC    Collection Time: 06/24/22  2:51 AM   Result Value Ref Range    Color YELLOW/STRAW      Appearance CLEAR CLEAR      Specific gravity 1.025 1.003 - 1.030      pH (UA) 6.0 5.0 - 8.0      Protein Negative NEG mg/dL    Glucose Negative NEG mg/dL    Ketone 40 (A) NEG mg/dL    Bilirubin Negative NEG      Blood Negative NEG      Urobilinogen 0.2 0.2 - 1.0 EU/dL    Nitrites Negative NEG      Leukocyte Esterase TRACE (A) NEG      WBC 0-4 0 - 4 /hpf    RBC 0-5 0 - 5 /hpf    Epithelial cells FEW FEW /lpf    Bacteria Negative NEG /hpf   URINE CULTURE HOLD SAMPLE    Collection Time: 06/24/22  2:51 AM    Specimen: Serum; Urine   Result Value Ref Range    Urine culture hold        Urine on hold in Microbiology dept for 2 days.   If unpreserved urine is submitted, it cannot be used for addtional testing after 24 hours, recollection will be required. METABOLIC PANEL, BASIC    Collection Time: 06/24/22  5:05 AM   Result Value Ref Range    Sodium 134 (L) 136 - 145 mmol/L    Potassium 3.1 (L) 3.5 - 5.1 mmol/L    Chloride 99 97 - 108 mmol/L    CO2 27 21 - 32 mmol/L    Anion gap 8 5 - 15 mmol/L    Glucose 102 (H) 65 - 100 mg/dL    BUN 16 6 - 20 MG/DL    Creatinine 0.58 0.55 - 1.02 MG/DL    BUN/Creatinine ratio 28 (H) 12 - 20      GFR est AA >60 >60 ml/min/1.73m2    GFR est non-AA >60 >60 ml/min/1.73m2    Calcium 8.0 (L) 8.5 - 10.1 MG/DL   EKG, 12 LEAD, INITIAL    Collection Time: 06/24/22  6:10 AM   Result Value Ref Range    Ventricular Rate 100 BPM    Atrial Rate 100 BPM    P-R Interval 138 ms    QRS Duration 66 ms    Q-T Interval 322 ms    QTC Calculation (Bezet) 415 ms    Calculated P Axis 33 degrees    Calculated R Axis 10 degrees    Calculated T Axis 47 degrees    Diagnosis       Normal sinus rhythm  Septal infarct , age undetermined  Abnormal ECG  When compared with ECG of 23-JUN-2022 20:45,  MANUAL COMPARISON REQUIRED, DATA IS UNCONFIRMED         IMAGING RESULTS:   XR HAND RT AP/LAT   Final Result   No significant change in alignment of fourth and fifth metacarpal neck   fractures. XR HAND RT MIN 3 V   Final Result   Acute fourth and fifth metacarpal neck fractures similar to prior study. XR HAND LT AP/LAT   Final Result   No acute fracture. XR HAND LT MIN 3 V   Final Result   Addendum 1 of 1   Addendum:       Images were incorrectly labeled as left hand. Subsequent reduction film of    the   right hand demonstrate that the fractures were of the right fourth and    fifth   metacarpals. Thus, the examination was right hand radiographs. Final   Acute displaced fifth metacarpal neck fracture. Acute angulated fourth   metacarpal neck fracture. CT HEAD WO CONT   Final Result   No evidence of acute process.             CT SPINE CERV WO CONT   Final Result   No acute abnormality. Multilevel spondylosis. CT CHEST WO CONT   Final Result   No evidence of acute process. Incidentals as above. CT ABD PELV WO CONT   Final Result   No evidence of acute process. Incidentals as above. MEDICATIONS GIVEN:  Medications   sodium chloride 0.9 % bolus infusion 500 mL (0 mL IntraVENous IV Completed 6/23/22 2236)   potassium chloride 10 mEq in 100 ml IVPB (0 mEq IntraVENous IV Completed 6/24/22 0303)   lidocaine (XYLOCAINE) 10 mg/mL (1 %) injection 10 mL (10 mL IntraDERMal Given by Provider 6/23/22 2300)   clindamycin (CLEOCIN) IVPB premix 300 mg (300 mg IntraVENous New Bag 6/24/22 0406)       IMPRESSION:  1. Hypokalemia    2. Closed displaced fracture of neck of fourth metacarpal bone of left hand, initial encounter    3. Closed displaced fracture of neck of fifth metacarpal bone of left hand, initial encounter        PLAN:  1.   Hospital Admission

## 2022-06-24 NOTE — ED NOTES
1106: First Attempt to call report to 6w room 643.     1116: Second attempt to call report. 1125: Third attempt to call report.

## 2022-06-24 NOTE — ED NOTES
Verbal shift change report given to Ryann Hernandez RN (oncoming nurse) by Abraham Rouse RN (offgoing nurse). Report included the following information SBAR, Kardex, ED Summary, MAR, Recent Results, Med Rec Status, Cardiac Rhythm Sinus Tach  and Alarm Parameters .

## 2022-06-24 NOTE — H&P
[address] [facility] Adult  Hospitalist Group  History and Physical    Date of Service:  6/24/2022  Primary Care Provider: Richardine Primrose, MD  Source of information: Chart review    Chief Complaint: Altered mental status      History of Presenting Illness:   Duane Madura is a [de-identified] y.o. female who presents with fall     Patient has history of dementia, not much history could be obtained from the patient, history was probably obtained from patient's daughter Munira,Pt lives at [facility] house was found on the floor, had a fall, was brought to [facility] ER, patient was found hypokalemic and with metacarpal fractures, per daughter patient has significant dementia, patient was requested to be admitted to the hospitalist service. Currently patient is resting in bed, is alert x0, does not appear in any acute distress. REVIEW OF SYSTEMS:  Review of systems not obtained due to patient factors. Dementia    Past Medical History:   Diagnosis Date    Acute meniscal tear of knee     Alzheimer disease ([address]     Bulging disc     Dementia ([address]     DJD (degenerative joint disease)     l knee    GERD (gastroesophageal reflux disease)     HTN     Hypothyroid     OA (osteoarthritis)     Osteoporosis     Spinal stenosis       Past Surgical History:   Procedure Laterality Date    COLONOSCOPY       HX HYSTERECTOMY  1979    HX ORTHOPAEDIC      left knee 2008    IR DILATE ESOPH STRICT      2004    NEUROLOGICAL PROCEDURE UNLISTED  2013    HI BREAST SURGERY PROCEDURE UNLISTED      1968 implants    STRESS TEST - GXT  2005    neg     Prior to Admission medications    Medication Sig Start Date End Date Taking? Authorizing Provider   acetaminophen (TylenoL) 325 mg tablet Take 650 mg by mouth every four (4) hours as needed for Pain. Yuliana Mcguire MD   amLODIPine (NORVASC) 5 mg tablet Take 5 mg by mouth daily. Yuliana Mcguire MD   QUEtiapine (SEROquel) 25 mg tablet Take 25 mg by mouth.     Yuliana Mcguire MD risperiDONE (RisperDAL) 1 mg tablet Take 1 mg by mouth daily. Other, MD Yuliana   sertraline (ZOLOFT) 25 mg tablet Take 25 mg by mouth daily. Yuliana Mcguire MD   levothyroxine (SYNTHROID) 150 mcg tablet TAKE 1 TABLET BY MOUTH ONCE DAILY BEFORE BREAKFAST 5/4/21   Wilfrido Groves MD   donepeziL (ARICEPT) 10 mg tablet Take 1 Tab by mouth daily. Indications: mild to moderate Alzheimer's type dementia 3/17/21   Wilfrido Groves MD   losartan-hydroCHLOROthiazide [facility]) 100-12.5 mg per tablet Take 1 Tab by mouth daily. 8/21/20   Wilfrido Groves MD     Allergies   Allergen Reactions    Ace Inhibitors Hives and Swelling    Ambien [Zolpidem] Other (comments)     HALLUCINATIONS    Keflex [Cephalexin] Other (comments)     Pt reports having hives from head to toe so her PCP included several drugs that she was taking at the time as the cause (Keflex, Levaquin and ACE inhibitors)    Levaquin [Levofloxacin] Not Reported This Time      Family History   Problem Relation Age of Onset    Cancer Father     Heart Attack Brother     Stroke Brother     Coronary Art Dis Brother       Social History:  reports that she has never smoked. She has never used smokeless tobacco. She reports previous alcohol use. She reports that she does not use drugs. Family and social history were personally reviewed, all pertinent and relevant details are outlined as above.     Objective:     Visit Vitals  /64 (BP 1 Location: Left upper arm, BP Patient Position: At rest)   Pulse 77   Temp 98.9 °F (37.2 °C)   Resp 18   SpO2 95%      O2 Device: None (Room air)    PHYSICAL EXAM:   General: Awake, confused  HEENT: PEERL, moist mucus membranes  Neck: Supple, full range of motion  Chest: Decreased basal breath sounds  CVS: RRR, S1 S2 heard, no murmurs/rubs/gallops  Abd: Soft, non-tender, non-distended, +bowel sounds   Ext: No clubbing, tenderness right wrist  Neuro/Psych: Patient with difficult exam, moves all 4 but strength could not be tested cranial nerves exam could not be tested  Cap refill: Brisk, less than 3 seconds  Pulses: 2+, symmetric in all extremities  Skin: Warm, dry,     Data Review: All diagnostic labs and studies have been reviewed. Abnormal Labs Reviewed   CBC WITH AUTOMATED DIFF - Abnormal; Notable for the following components:       Result Value    RBC 3.46 (*)     HGB 11.2 (*)     HCT 31.8 (*)     IMMATURE GRANULOCYTES 1 (*)     All other components within normal limits   METABOLIC PANEL, COMPREHENSIVE - Abnormal; Notable for the following components:    Sodium 135 (*)     Potassium 3.0 (*)     Chloride 96 (*)     BUN/Creatinine ratio 28 (*)     All other components within normal limits   URINALYSIS W/MICROSCOPIC - Abnormal; Notable for the following components:    Ketone 40 (*)     Leukocyte Esterase TRACE (*)     All other components within normal limits   METABOLIC PANEL, BASIC - Abnormal; Notable for the following components:    Sodium 134 (*)     Potassium 3.1 (*)     Glucose 102 (*)     BUN/Creatinine ratio 28 (*)     Calcium 8.0 (*)     All other components within normal limits       All Micro Results     Procedure Component Value Units Date/Time    COVID-19 RAPID TEST [[id number]]     Order Status: Sent     CULTURE, URINE [[id number]]     Order Status: Sent Specimen: Urine from Clean catch     URINE CULTURE HOLD SAMPLE [[id number]] Collected: 06/24/22 0251    Order Status: Completed Specimen: Urine from Serum Updated: 06/24/22 0256     Urine culture hold       Urine on hold in Microbiology dept for 2 days. If unpreserved urine is submitted, it cannot be used for addtional testing after 24 hours, recollection will be required. IMAGING:   XR HAND RT AP/LAT   Final Result   No significant change in alignment of fourth and fifth metacarpal neck   fractures. XR HAND RT MIN 3 V   Final Result   Acute fourth and fifth metacarpal neck fractures similar to prior study.          XR HAND LT AP/LAT   Final Result No acute fracture. XR HAND RT MIN 3 V   Final Result   Addendum 1 of 1   Addendum:       Images were incorrectly labeled as left hand. Subsequent reduction film of    the   right hand demonstrate that the fractures were of the right fourth and    fifth   metacarpals. Thus, the examination was right hand radiographs. Final   Acute displaced fifth metacarpal neck fracture. Acute angulated fourth   metacarpal neck fracture. CT HEAD WO CONT   Final Result   No evidence of acute process. CT SPINE CERV WO CONT   Final Result   No acute abnormality. Multilevel spondylosis. CT CHEST WO CONT   Final Result   No evidence of acute process. Incidentals as above. CT ABD PELV WO CONT   Final Result   No evidence of acute process. Incidentals as above. ECG/ECHO:    Results for orders placed or performed during the hospital encounter of 06/23/22   EKG, 12 LEAD, INITIAL   Result Value Ref Range    Ventricular Rate 100 BPM    Atrial Rate 100 BPM    P-R Interval 138 ms    QRS Duration 66 ms    Q-T Interval 322 ms    QTC Calculation (Bezet) 415 ms    Calculated P Axis 33 degrees    Calculated R Axis 10 degrees    Calculated T Axis 47 degrees    Diagnosis       Normal sinus rhythm  Septal infarct , age undetermined  Abnormal ECG  When compared with ECG of 23-JUN-2022 20:45,  MANUAL COMPARISON REQUIRED, DATA IS UNCONFIRMED  Confirmed by Russell Villatoro ([id number]) on 6/24/2022 7:09:40 AM     Results for orders placed or performed in visit on 04/18/12   AMB POC EKG ROUTINE W/ 12 LEADS, INTER & REP    Narrative    See scanned report. Assessment:   Given the patient's current clinical presentation, there is a high level of concern for decompensation if discharged from the emergency department. Complex decision making was performed, which includes reviewing the patient's available past medical records, laboratory results, and imaging studies.     Fall  Hypokalemia  Dehydration  Right metacarpal fracture  Dementia  Hypertension  Hypothyroidism  Plan:   Patient will be admitted on a telemetry bed  Replace potassium  IV hydration, repeat labs in the morning  Ortho consult, keep hand immobile, pain control, close monitoring  Fall precautions monitor  Optimal blood pressure control  Continue home medication      DIET: DIET ONE TIME MESSAGE  ADULT DIET Regular; 4 carb choices (60 gm/meal); Low Fat/Low Chol/High Fiber/2 gm Na; Low Sodium (2 gm)   ISOLATION PRECAUTIONS: There are currently no Active Isolations  CODE STATUS: DNR   DVT PROPHYLAXIS: SCDs  FUNCTIONAL STATUS PRIOR TO HOSPITALIZATION: Capable of only limited self-care; confined to bed or chair likely more than 50% of waking hours. EARLY MOBILITY ASSESSMENT: Recommend an assessment from physical therapy and/or occupational therapy  ANTICIPATED DISCHARGE: 24-48 hours. Signed By: Lenin Mike MD     June 24, 2022         Please note that this dictation may have been completed with Dragon, the [employer] voice recognition software. Quite often unanticipated grammatical, syntax, homophones, and other interpretive errors are inadvertently transcribed by the computer software. Please disregard these errors. Please excuse any errors that have escaped final proofreading.

## 2022-06-24 NOTE — CONSULTS
ORTHOPEDIC CONSULT NOTE    Subjective:     Date of Consultation:  June 24, 2022  Referring Physician:  Jacinto Jovel MD    Emiliano Lloyd is a [de-identified] y.o. female with dementia who is being seen for right 4th and 5th metacarpal fractures. The patient was originally seen at [facility] and transferred to [facility] for admission due to hypokalemia. She underwent an attempted reduction at [facility] for boxers fracture without successfully maintained reduction. The patient does not remember what happened to her or why she has a splint on her hand. She is not able to offer history. She is focused on sitting up and getting out of bed. She makes multiple attempts to push herself up on the fractured hand, even against recs to lay back. She denies distal paresthesias.     Patient Active Problem List    Diagnosis Date Noted    Hypokalemia 06/24/2022    Dementia without behavioral disturbance [facility] 08/13/2018    History of right shoulder replacement 10/31/2017    Advanced care planning/counseling discussion 03/27/2017    Advance care planning 03/15/2016    Asthma 12/17/2013    Bronchitis 09/16/2013    Bronchospasm with bronchitis, acute 09/16/2013    DDD (degenerative disc disease), lumbar 11/01/2011    Tachycardia 08/12/2011    HTN (hypertension) 05/10/2010    Hypothyroid 05/10/2010    OA (osteoarthritis) 05/10/2010    GERD (gastroesophageal reflux disease) 05/10/2010    Osteoporosis 05/10/2010    Left knee DJD 05/10/2010       Family History   Problem Relation Age of Onset    Cancer Father     Heart Attack Brother     Stroke Brother     Coronary Art Dis Brother       Social History     Tobacco Use    Smoking status: Never Smoker    Smokeless tobacco: Never Used   Substance Use Topics    Alcohol use: Not Currently     Comment: occas     Past Medical History:   Diagnosis Date    Acute meniscal tear of knee     Alzheimer disease ([address]     Bulging disc     Dementia ([address]     DJD (degenerative joint disease)     l knee    GERD (gastroesophageal reflux disease)     HTN     Hypothyroid     OA (osteoarthritis)     Osteoporosis     Spinal stenosis       Past Surgical History:   Procedure Laterality Date    COLONOSCOPY       HX HYSTERECTOMY  1979    HX ORTHOPAEDIC      left knee 2008    IR DILATE ESOPH STRICT      2004    NEUROLOGICAL PROCEDURE UNLISTED  2013    DC BREAST SURGERY PROCEDURE UNLISTED      1968 implants    STRESS TEST - GXT  2005    neg      Prior to Admission medications    Medication Sig Start Date End Date Taking? Authorizing Provider   acetaminophen (TylenoL) 325 mg tablet Take 650 mg by mouth every four (4) hours as needed for Pain. Yuliana Mcguire MD   amLODIPine (NORVASC) 5 mg tablet Take 5 mg by mouth daily. Yuliana Mcguire MD   QUEtiapine (SEROquel) 25 mg tablet Take 25 mg by mouth. Yuliana Mcguire MD   risperiDONE (RisperDAL) 1 mg tablet Take 1 mg by mouth daily. Yuliana Mcguire MD   sertraline (ZOLOFT) 25 mg tablet Take 25 mg by mouth daily. Yuliana Mcguire MD   levothyroxine (SYNTHROID) 150 mcg tablet TAKE 1 TABLET BY MOUTH ONCE DAILY BEFORE BREAKFAST 5/4/21   Marah Burden MD   donepeziL (ARICEPT) 10 mg tablet Take 1 Tab by mouth daily. Indications: mild to moderate Alzheimer's type dementia 3/17/21   Marah Burden MD   losartan-hydroCHLOROthiazide [place]) 100-12.5 mg per tablet Take 1 Tab by mouth daily.  8/21/20   Marah Burden MD     Current Facility-Administered Medications   Medication Dose Route Frequency    sodium chloride (NS) flush 5-40 mL  5-40 mL IntraVENous Q8H    sodium chloride (NS) flush 5-40 mL  5-40 mL IntraVENous PRN    0.9% sodium chloride with KCl 20 mEq/L infusion   IntraVENous CONTINUOUS    acetaminophen (TYLENOL) tablet 650 mg  650 mg Oral Q4H PRN    potassium chloride SR (KLOR-CON 10) tablet 20 mEq  20 mEq Oral NOW      Allergies   Allergen Reactions    Ace Inhibitors Hives and Swelling    Ambien [Zolpidem] Other (comments)     HALLUCINATIONS    Keflex [Cephalexin] Other (comments)     Pt reports having hives from head to toe so her PCP included several drugs that she was taking at the time as the cause (Keflex, Levaquin and ACE inhibitors)    Levaquin [Levofloxacin] Not Reported This Time        Review of Systems:  A comprehensive review of systems was negative except for that written in the HPI. Mental Status: Dementia    Objective:     Patient Vitals for the past 8 hrs:   BP Temp Pulse Resp SpO2   22 1400 -- -- 77 -- --   22 1232 139/64 98.9 °F (37.2 °C) 85 18 --   22 1130 (!) 112/53 98.4 °F (36.9 °C) 86 16 95 %   22 1050 (!) 131/59 -- (!) 102 -- 99 %   22 1045 (!) 111/58 98.3 °F (36.8 °C) (!) 112 16 100 %   22 1000 (!) 111/58 -- 85 -- --   22 0820 110/60 97.6 °F (36.4 °C) 73 16 96 %   22 0745 -- -- (!) 108 -- --   22 0730 -- -- (!) 130 -- 96 %   22 0715 -- -- (!) 105 -- 96 %   22 0700 139/67 -- (!) 126 -- (!) 89 %     Temp (24hrs), Av °F (36.7 °C), Min:96.6 °F (35.9 °C), Max:98.9 °F (37.2 °C)        ORTHO EXAM:  uncooperative, no distress, appears stated age  RUE MSK: Right Hand swelling to the ulnar aspect of the 4th/5th MC, ecchymosis. Superficial lac to the base of the 4th MC, no active drainage. Negative wrist drop. Wiggling all digits, although diminished flex/extension to the 5th digit. Sensation to light touch intact, of hand. Compartments of the hand are soft but compressible. Cap refill is brisk. 2+ radial pulse. Skin is warm and dry. No area of fluctuance appreciated. IMAGING REVIEW:  INDICATION:   Second reduction     COMPARISON: 2022     FINDINGS:     2 views of the right hand again demonstrate angulated fourth and fifth  metacarpal neck fractures without significant change in alignment. Overlying  splinting material.     IMPRESSION  No significant change in alignment of fourth and fifth metacarpal neck  fractures.     Labs:   Recent Results (from the past 24 hour(s))   EKG, 12 LEAD, INITIAL    Collection Time: 06/23/22  8:45 PM   Result Value Ref Range    Ventricular Rate 100 BPM    Atrial Rate 100 BPM    P-R Interval 136 ms    QRS Duration 72 ms    Q-T Interval 292 ms    QTC Calculation (Bezet) 376 ms    Calculated P Axis 40 degrees    Calculated R Axis 17 degrees    Calculated T Axis 47 degrees    Diagnosis       Sinus rhythm with premature supraventricular complexes  Septal infarct (cited on or before 16-SEP-2013)  When compared with ECG of 16-SEP-2013 15:11,  premature supraventricular complexes are now present  Questionable change in initial forces of Septal leads  Nonspecific T wave abnormality now evident in Lateral leads  Confirmed by Jessie Rowe ([id number]) on 6/24/2022 7:09:04 AM     CBC WITH AUTOMATED DIFF    Collection Time: 06/23/22  8:54 PM   Result Value Ref Range    WBC 8.5 3.6 - 11.0 K/uL    RBC 3.46 (L) 3.80 - 5.20 M/uL    HGB 11.2 (L) 11.5 - 16.0 g/dL    HCT 31.8 (L) 35.0 - 47.0 %    MCV 91.9 80.0 - 99.0 FL    MCH 32.4 26.0 - 34.0 PG    MCHC 35.2 30.0 - 36.5 g/dL    RDW 11.9 11.5 - 14.5 %    PLATELET 133 539 - 323 K/uL    MPV 10.0 8.9 - 12.9 FL    NRBC 0.0 0  WBC    ABSOLUTE NRBC 0.00 0.00 - 0.01 K/uL    NEUTROPHILS 75 32 - 75 %    LYMPHOCYTES 15 12 - 49 %    MONOCYTES 8 5 - 13 %    EOSINOPHILS 0 0 - 7 %    BASOPHILS 1 0 - 1 %    IMMATURE GRANULOCYTES 1 (H) 0.0 - 0.5 %    ABS. NEUTROPHILS 6.5 1.8 - 8.0 K/UL    ABS. LYMPHOCYTES 1.3 0.8 - 3.5 K/UL    ABS. MONOCYTES 0.7 0.0 - 1.0 K/UL    ABS. EOSINOPHILS 0.0 0.0 - 0.4 K/UL    ABS. BASOPHILS 0.0 0.0 - 0.1 K/UL    ABS. IMM.  GRANS. 0.0 0.00 - 0.04 K/UL    DF AUTOMATED     METABOLIC PANEL, COMPREHENSIVE    Collection Time: 06/23/22  8:54 PM   Result Value Ref Range    Sodium 135 (L) 136 - 145 mmol/L    Potassium 3.0 (L) 3.5 - 5.1 mmol/L    Chloride 96 (L) 97 - 108 mmol/L    CO2 27 21 - 32 mmol/L    Anion gap 12 5 - 15 mmol/L    Glucose 90 65 - 100 mg/dL    BUN 20 6 - 20 MG/DL    Creatinine 0.72 0.55 - 1.02 MG/DL    BUN/Creatinine ratio 28 (H) 12 - 20      GFR est AA >60 >60 ml/min/1.73m2    GFR est non-AA >60 >60 ml/min/1.73m2    Calcium 9.1 8.5 - 10.1 MG/DL    Bilirubin, total 0.8 0.2 - 1.0 MG/DL    ALT (SGPT) 20 12 - 78 U/L    AST (SGOT) 26 15 - 37 U/L    Alk. phosphatase 60 45 - 117 U/L    Protein, total 6.5 6.4 - 8.2 g/dL    Albumin 3.5 3.5 - 5.0 g/dL    Globulin 3.0 2.0 - 4.0 g/dL    A-G Ratio 1.2 1.1 - 2.2     TROPONIN-HIGH SENSITIVITY    Collection Time: 06/23/22  8:54 PM   Result Value Ref Range    Troponin-High Sensitivity 13 0 - 51 ng/L   SAMPLES BEING HELD    Collection Time: 06/23/22  8:54 PM   Result Value Ref Range    SAMPLES BEING HELD 1 RED, 1 BLUE, 1 GREY, 1 DK GREEN     COMMENT        Add-on orders for these samples will be processed based on acceptable specimen integrity and analyte stability, which may vary by analyte. URINALYSIS W/MICROSCOPIC    Collection Time: 06/24/22  2:51 AM   Result Value Ref Range    Color YELLOW/STRAW      Appearance CLEAR CLEAR      Specific gravity 1.025 1.003 - 1.030      pH (UA) 6.0 5.0 - 8.0      Protein Negative NEG mg/dL    Glucose Negative NEG mg/dL    Ketone 40 (A) NEG mg/dL    Bilirubin Negative NEG      Blood Negative NEG      Urobilinogen 0.2 0.2 - 1.0 EU/dL    Nitrites Negative NEG      Leukocyte Esterase TRACE (A) NEG      WBC 0-4 0 - 4 /hpf    RBC 0-5 0 - 5 /hpf    Epithelial cells FEW FEW /lpf    Bacteria Negative NEG /hpf   URINE CULTURE HOLD SAMPLE    Collection Time: 06/24/22  2:51 AM    Specimen: Serum; Urine   Result Value Ref Range    Urine culture hold        Urine on hold in Microbiology dept for 2 days. If unpreserved urine is submitted, it cannot be used for addtional testing after 24 hours, recollection will be required.    METABOLIC PANEL, BASIC    Collection Time: 06/24/22  5:05 AM   Result Value Ref Range    Sodium 134 (L) 136 - 145 mmol/L    Potassium 3.1 (L) 3.5 - 5.1 mmol/L    Chloride 99 97 - 108 mmol/L    CO2 27 21 - 32 mmol/L Anion gap 8 5 - 15 mmol/L    Glucose 102 (H) 65 - 100 mg/dL    BUN 16 6 - 20 MG/DL    Creatinine 0.58 0.55 - 1.02 MG/DL    BUN/Creatinine ratio 28 (H) 12 - 20      GFR est AA >60 >60 ml/min/1.73m2    GFR est non-AA >60 >60 ml/min/1.73m2    Calcium 8.0 (L) 8.5 - 10.1 MG/DL   EKG, 12 LEAD, INITIAL    Collection Time: 06/24/22  6:10 AM   Result Value Ref Range    Ventricular Rate 100 BPM    Atrial Rate 100 BPM    P-R Interval 138 ms    QRS Duration 66 ms    Q-T Interval 322 ms    QTC Calculation (Bezet) 415 ms    Calculated P Axis 33 degrees    Calculated R Axis 10 degrees    Calculated T Axis 47 degrees    Diagnosis       Normal sinus rhythm  Septal infarct , age undetermined  Abnormal ECG  When compared with ECG of 23-JUN-2022 20:45,  MANUAL COMPARISON REQUIRED, DATA IS UNCONFIRMED  Confirmed by Lady Díaz ([id number]) on 6/24/2022 7:09:40 AM     TROPONIN-HIGH SENSITIVITY    Collection Time: 06/24/22  6:48 AM   Result Value Ref Range    Troponin-High Sensitivity 14 0 - 51 ng/L         Impression:     Patient Active Problem List    Diagnosis Date Noted    Hypokalemia 06/24/2022    [facility] 08/13[facility]2018    History of right shoulder replacement 10/31/2017    Advanced care planning/counseling discussion 03/27/2017    Advance care planning 03/15/2016    Asthma 12/17/2013    Bronchitis 09/16/2013    Bronchospasm with bronchitis, acute 09/16/2013    DDD (degenerative disc disease), lumbar 11/01/2011    Tachycardia 08/12/2011    HTN (hypertension) 05/10/2010    Hypothyroid 05/10/2010    OA (osteoarthritis) 05/10/2010    GERD (gastroesophageal reflux disease) 05/10/2010    Osteoporosis 05/10/2010    Left knee DJD 05/10/2010       Active Problems:    Hypokalemia (6/24/2022)          ASSESSMENT:   Closed right 4th and 5th metacarpal neck fractures    Plan:   ORTHOPEDIC PLAN:  1. D/w Dr. Zoila Mendez  2. Ortho plan: No current surgical plans.  As pt confused and not following directions for care of splint, do not recommend perc pinning at this time. 3. Pain control: Per medical team, adequate. 4. Procedures: Attempted reduction, but pt uncooperative and continues to push off right hand in attempts to sit up. Patient resplinted. 5. Activity: NWB R hand. 6. Dispo: Patient okay to be d/c from an ortho standpoint. Pt to f/u with Dr. Franklin Bustillos in clinic upon d/c where she may receive a malreduction. Ortho will sign off. Please call if there are further ortho related questions.       [place], [address]

## 2022-06-24 NOTE — ED NOTES
TRANSFER - OUT REPORT:    Verbal report given to Tish Mckeon RN(name) on [facility]  being transferred to ER(unit) for routine progression of care       Report consisted of patients Situation, Background, Assessment and   Recommendations(SBAR). Information from the following report(s) SBAR, Kardex, ED Summary, MAR, Recent Results and Cardiac Rhythm Sinus Tach  was reviewed with the receiving nurse. Lines:   Peripheral IV 06/23/22 Left Antecubital (Active)        Opportunity for questions and clarification was provided.       Patient transported with:   Monitor

## 2022-06-24 NOTE — ED TRIAGE NOTES
Patient arrives via EMS from [facility] dementia unit. Per EMS, staff states patient has been more altered than usual; onset Tuesday. Staff report pt has been HYPOtensive and TACHYcardiac x 2 days. EMS reports pt cannot ambulate; which is a change. EMS states, the staff stated pt had a GLF and hit her chin and upper lip. Hx dementia. EMS reports pt is a DDNR.

## 2022-06-24 NOTE — ED NOTES
TRANSFER - OUT REPORT:    Verbal report given to Wan Norris(name) on Marlo Merrillus  being transferred to (unit) for routine progression of care       Report consisted of patients Situation, Background, Assessment and   Recommendations(SBAR). Information from the following report(s) SBAR, Kardex, ED Summary, MAR, Recent Results and Cardiac Rhythm Sinus Tach  was reviewed with the receiving nurse. Lines:   Peripheral IV 06/23/22 Left Antecubital (Active)        Opportunity for questions and clarification was provided.       Patient transported with:   Monitor

## 2022-06-24 NOTE — ED NOTES
Patient signed out to me by Dr. Emma La at [address] [place] [address]  [de-identified] y.o. female being admitted for hypoKalemia and fall. On hospitalist service now. Boarding in ED. Nursing supervisor requesting pt be transferred to [facility] [place] ED. I called/spoke with Dr. Stanley Vela who accepts txfr.     Javier Mccarty, DO

## 2022-06-24 NOTE — DISCHARGE INSTRUCTIONS
Sub-optimal reduction of dislocated 5th metacarpal.  Referral to Orthopedic Surgeon for further care. 1. Activity: Non weight bearing of the  Right  hand. 2.   Follow up  with Dr. Mehdi Valdes in clinic upon discharge  where she may receive a malreduction.

## 2022-06-25 LAB
ANION GAP SERPL CALC-SCNC: 13 MMOL/L (ref 5–15)
BACTERIA SPEC CULT: NORMAL
BASOPHILS # BLD: 0 K/UL (ref 0–0.1)
BASOPHILS NFR BLD: 0 % (ref 0–1)
BUN SERPL-MCNC: 12 MG/DL (ref 6–20)
BUN/CREAT SERPL: 29 (ref 12–20)
CALCIUM SERPL-MCNC: 8.7 MG/DL (ref 8.5–10.1)
CHLORIDE SERPL-SCNC: 104 MMOL/L (ref 97–108)
CK SERPL-CCNC: 122 U/L (ref 26–192)
CO2 SERPL-SCNC: 19 MMOL/L (ref 21–32)
CREAT SERPL-MCNC: 0.41 MG/DL (ref 0.55–1.02)
DIFFERENTIAL METHOD BLD: ABNORMAL
EOSINOPHIL # BLD: 0.1 K/UL (ref 0–0.4)
EOSINOPHIL NFR BLD: 2 % (ref 0–7)
ERYTHROCYTE [DISTWIDTH] IN BLOOD BY AUTOMATED COUNT: 11.9 % (ref 11.5–14.5)
GLUCOSE SERPL-MCNC: 59 MG/DL (ref 65–100)
HCT VFR BLD AUTO: 28 % (ref 35–47)
HGB BLD-MCNC: 9.9 G/DL (ref 11.5–16)
IMM GRANULOCYTES # BLD AUTO: 0 K/UL (ref 0–0.04)
IMM GRANULOCYTES NFR BLD AUTO: 0 % (ref 0–0.5)
LYMPHOCYTES # BLD: 2 K/UL (ref 0.8–3.5)
LYMPHOCYTES NFR BLD: 27 % (ref 12–49)
MCH RBC QN AUTO: 32.9 PG (ref 26–34)
MCHC RBC AUTO-ENTMCNC: 35.4 G/DL (ref 30–36.5)
MCV RBC AUTO: 93 FL (ref 80–99)
MONOCYTES # BLD: 0.5 K/UL (ref 0–1)
MONOCYTES NFR BLD: 7 % (ref 5–13)
NEUTS SEG # BLD: 4.8 K/UL (ref 1.8–8)
NEUTS SEG NFR BLD: 64 % (ref 32–75)
NRBC # BLD: 0 K/UL (ref 0–0.01)
NRBC BLD-RTO: 0 PER 100 WBC
PLATELET # BLD AUTO: 297 K/UL (ref 150–400)
PMV BLD AUTO: 9.9 FL (ref 8.9–12.9)
POTASSIUM SERPL-SCNC: 3.7 MMOL/L (ref 3.5–5.1)
RBC # BLD AUTO: 3.01 M/UL (ref 3.8–5.2)
SERVICE CMNT-IMP: NORMAL
SODIUM SERPL-SCNC: 136 MMOL/L (ref 136–145)
WBC # BLD AUTO: 7.5 K/UL (ref 3.6–11)

## 2022-06-25 PROCEDURE — 74011250636 HC RX REV CODE- 250/636: Performed by: FAMILY MEDICINE

## 2022-06-25 PROCEDURE — 85025 COMPLETE CBC W/AUTO DIFF WBC: CPT

## 2022-06-25 PROCEDURE — 80048 BASIC METABOLIC PNL TOTAL CA: CPT

## 2022-06-25 PROCEDURE — 36415 COLL VENOUS BLD VENIPUNCTURE: CPT

## 2022-06-25 PROCEDURE — 74011250637 HC RX REV CODE- 250/637: Performed by: FAMILY MEDICINE

## 2022-06-25 PROCEDURE — 97530 THERAPEUTIC ACTIVITIES: CPT | Performed by: PHYSICAL THERAPIST

## 2022-06-25 PROCEDURE — 65270000046 HC RM TELEMETRY

## 2022-06-25 PROCEDURE — 82550 ASSAY OF CK (CPK): CPT

## 2022-06-25 PROCEDURE — 97161 PT EVAL LOW COMPLEX 20 MIN: CPT | Performed by: PHYSICAL THERAPIST

## 2022-06-25 RX ADMIN — QUETIAPINE FUMARATE 25 MG: 25 TABLET ORAL at 22:02

## 2022-06-25 RX ADMIN — LEVOTHYROXINE SODIUM 150 MCG: 0.15 TABLET ORAL at 06:53

## 2022-06-25 RX ADMIN — DONEPEZIL HYDROCHLORIDE 10 MG: 10 TABLET ORAL at 09:15

## 2022-06-25 RX ADMIN — AMLODIPINE BESYLATE 5 MG: 5 TABLET ORAL at 09:15

## 2022-06-25 RX ADMIN — POTASSIUM CHLORIDE AND SODIUM CHLORIDE: 900; 150 INJECTION, SOLUTION INTRAVENOUS at 08:08

## 2022-06-25 RX ADMIN — SERTRALINE 25 MG: 50 TABLET, FILM COATED ORAL at 09:15

## 2022-06-25 RX ADMIN — RISPERIDONE 1 MG: 0.5 TABLET ORAL at 09:15

## 2022-06-25 NOTE — PROGRESS NOTES
Transition of Care    Reason for Admission:  Fall                   RUR Score:  14%                   Plan for utilizing home health:          PCP: First and Last name:  MD Dr. Leslee Katz     Name of Practice: [facility]   Are you a current patient: Yes/No: Yes   Approximate date of last visit: 6/24/2022   Can you participate in a virtual visit with your PCP: No                      Current Advanced Directive/Advance Care Plan: DNR    Ms. Mckeon Lundberg does have advance care documents. Ms. Paula Celis was asked to provide a copy for the hospital.       Healthcare Decision Maker:   Click here to complete [address] including selection of the Healthcare Decision Maker Relationship (ie \"Primary\")           Ms. Carlos Croft is Mr. Duncan Standard healthcare decision maker. [phone]                  Transition of Care Plan:   Ms. Carlos Croft was called. She provided the following information. Ms. Zaynab Smith lives at the [facility] on [address] [place] [address]. She lives in a memory care unit. She needs prompting to complete her ADLs. She is ambulatory. he has lived there since 11/2021. Ms. Paula Celis was asked about her mother discharging today. She stated that she had some concerns because she believed that her mother would use her broken hand. Mr. Paula Celis was given the IM from Medicare over the phone. She stated that she would appeal to give her more time to figure whether [facility] can take her mother back. [facility] was called. A message was left for them to call. Care Management Interventions  PCP Verified by CM:  Yes  Last Visit to PCP: 06/24/22  Palliative Care Criteria Met (RRAT>21 & CHF Dx)?: No  Mode of Transport at Discharge: BLS  Transition of Care Consult (CM Consult): Discharge Planning  MyChart Signup: No  Discharge Durable Medical Equipment: No  Physical Therapy Consult: No  Occupational Therapy Consult: No  Speech Therapy Consult: No  Support Systems: Child(jennifer)  Confirm Follow Up Transport: Family  The Patient and/or Patient Representative was Provided with a Choice of Provider and Agrees with the Discharge Plan?: No  Freedom of Choice List was Provided with Basic Dialogue that Supports the Patient's Individualized Plan of Care/Goals, Treatment Preferences and Shares the Quality Data Associated with the Providers?: No  Hasty Resource Information Provided?: No  Discharge Location  Patient Expects to be Discharged to[de-identified] Assisted Living    Will continue to follow for discharge planning.   Signed By: Phyllis Wolf LCSW     June 25, 2022

## 2022-06-25 NOTE — PROGRESS NOTES
Problem: Mobility Impaired (Adult and Pediatric)  Goal: *Acute Goals and Plan of Care (Insert Text)  Description: FUNCTIONAL STATUS PRIOR TO ADMISSION: Patient with advanced dementia and unable to provide any PLOF    HOME SUPPORT PRIOR TO ADMISSION: Lives a [employer] (unsure if this is group home or SNF)    Physical Therapy Goals  Initiated 6/25/2022  1. Patient will move from supine to sit and sit to supine  in bed with supervision/set-up within 7 day(s). 2.  Patient will transfer from bed to chair and chair to bed with minimal assistance using the least restrictive device within 7 day(s). 3.  Patient will perform sit to stand with minimal assistance within 7 day(s). 4.  Patient will ambulate with minimal assistance for 15 feet with the least restrictive device within 7 day(s). Outcome: Not Progressing Towards Goal   PHYSICAL THERAPY EVALUATION  Patient: Cecy Kruse (58 y.o. female)  Date: 6/25/2022  Primary Diagnosis: Hypokalemia [E87.6]        Precautions:   Fall    ASSESSMENT  Based on the objective data described below, the patient presents with decreased functional mobility from baseline level of function. Patient currently limited by confusion, impaired balance, gait instability and decreased activity tolerance. Currently supine in bed and is oriented to self only. Needing Su-modA to come to EOB and modA to come to stand. Takes a few steps to the [facility] with modA with major posterior lean and poor balance. Unsure what patient's baseline but anticipate she is not far from baseline. Recommend return to previous facility unless they cannot provide level of care she needs. Other factors to consider for discharge: confused     Patient will benefit from skilled therapy intervention to address the above noted impairments.        PLAN :  Recommendations and Planned Interventions: bed mobility training, transfer training, gait training, therapeutic exercises, neuromuscular re-education, patient and family training/education, and therapeutic activities      Frequency/Duration: Patient will be followed by physical therapy:  3 times a week to address goals. Recommendation for discharge: (in order for the patient to meet his/her long term goals)  Therapy up to 5 days/week in SNF setting      IF patient discharges home will need the following DME: to be determined (TBD)         SUBJECTIVE:   Patient stated I don't know.     OBJECTIVE DATA SUMMARY:   HISTORY:    Past Medical History:   Diagnosis Date    Acute meniscal tear of knee     Alzheimer disease ([facility] [address]     Bulging disc     Dementia (HCC)     DJD (degenerative joint disease)     l knee    GERD (gastroesophageal reflux disease)     HTN     Hypothyroid     OA (osteoarthritis)     Osteoporosis     Spinal stenosis      Past Surgical History:   Procedure Laterality Date    COLONOSCOPY       HX HYSTERECTOMY  1979    HX ORTHOPAEDIC      left knee 2008    IR DILATE ESOPH STRICT      2004    NEUROLOGICAL PROCEDURE UNLISTED  2013    MA BREAST SURGERY PROCEDURE UNLISTED      1968 implants    STRESS TEST - GXT  2005    neg       Personal factors and/or comorbidities impacting plan of care:     Home Situation  Patient Expects to be Discharged to[de-identified] Skilled nursing facility    EXAMINATION/PRESENTATION/DECISION MAKING:   Critical Behavior:  Neurologic State: Confused  Orientation Level: Disoriented to time,Disoriented to situation  Cognition: Decreased attention/concentration,Poor safety awareness,Impaired decision making     Hearing: Auditory  Auditory Impairment: None    Range Of Motion:  AROM: Generally decreased, functional                       Strength:    Strength: Generally decreased, functional       Functional Mobility:  Bed Mobility:  Rolling: Minimum assistance  Supine to Sit: Minimum assistance  Sit to Supine: Moderate assistance  Scooting: Supervision; Additional time  Transfers:  Sit to Stand: Minimum assistance  Stand to Sit: Minimum assistance Balance:   Sitting: Impaired  Sitting - Static: Good (unsupported)  Sitting - Dynamic: Fair (occasional)  Standing: Impaired  Standing - Static: Poor  Standing - Dynamic : Poor  Ambulation/Gait Training:  Distance (ft): 4 Feet (ft) (side steps to the [facility] x 2)  Assistive Device: Gait belt; Other (comment) (HHA)  Ambulation - Level of Assistance: Moderate assistance (posterior lean)     Gait Description (WDL): Exceptions to WDL  Gait Abnormalities: Decreased step clearance;Shuffling gait  Right Side Weight Bearing:  (NWB R hand)     Base of Support: Widened     Speed/Nuvia: Pace decreased (<100 feet/min); Slow  Step Length: Left shortened;Right shortened          Pain Rating:  No c/o pain    Activity Tolerance:   Fair and requires rest breaks    After treatment patient left in no apparent distress:   Supine in bed, Call bell within reach, Bed / chair alarm activated, and Side rails x 3    COMMUNICATION/EDUCATION:   The patients plan of care was discussed with: Physical therapist and Registered nurse. Patient is unable to participate in goal setting and plan of care.     Thank you for this referral.  Arian Watts, PT, DPT   Time Calculation: 15 mins

## 2022-06-25 NOTE — PROGRESS NOTES
[employer] Hospitalist Service Progress Note    INTERVAL HISTORY  / Subjective:  She is awake and alert, she does not report any painful symptoms     Assessment / Plan:  Marlo Zacarias is a [de-identified] y.o. female who presents with fall      Patient has history of dementia, not much history could be obtained from the patient, history was probably obtained from patient's daughter Munira,Pt lives at [address] was found on the floor, had a fall, was brought to [facility] ER, patient was found hypokalemic and with metacarpal fractures, per daughter patient has significant dementia, patient was requested to be admitted to the hospitalist service. Currently patient is resting in bed, is alert x0, does not appear in any acute distress. Hypokalemia  Dehydration  --Resolved Hypokalemia, still appears to have dry skin/mucosa , resume IV Hydration and encourage oral intake      Right metacarpal fracture  --06/25 Ortho consult and recommends d/c from an ortho standpoint. Pt to f/u with Dr. Saldana Fairly in clinic upon d/c where she may receive a malreduction. Ortho will sign off. Please call if there are further ortho related questions.         Dementia  --Resides at a memory care unit     Hypertension  --Blood pressure in control     Hypothyroidism  --Resume Synthroid       Chief Complaint : Altered mental status        Objective:  Visit Vitals  /62 (BP 1 Location: Left upper arm, BP Patient Position: At rest)   Pulse (!) 108   Temp 98.2 °F (36.8 °C)   Resp 22   SpO2 91%                 Physical Exam:  General: No acute distress, speaking in full sentences, no use of accessory muscles   HEENT: Pupils equal and reactive to light and accommodation, oropharynx is clear   Neck: Supple, no lymphadenopathy, no JVD   Lungs: Clear to auscultation bilaterally   Cardiovascular: Regular rate and rhythm with normal S1 and S2   Abdomen: Soft, nontender, nondistended, normoactive bowel sounds   Extremities: No cyanosis clubbing or edema Neuro: Nonfocal alert   Psych: Normal affect     Intake and Output:  Date 06/24/22 0700 - 06/25/22 0659 06/25/22 0700 - 06/26/22 0659   Shift [id number] 1900-0659 24 Hour Total 1053-2099 8067-8009 24 Hour Total   INTAKE   P.O.    300  300     P. O.    300  300   Shift Total    300  300   OUTPUT   Urine 400  400        Urine 400  400      Shift Total 400  400      NET -400  -400 300  300   Weight (kg)             LAB:  Admission on 06/23/2022   Component Date Value    WBC 06/23/2022 8.5     RBC 06/23/2022 3.46*    HGB 06/23/2022 11.2*    HCT 06/23/2022 31.8*    MCV 06/23/2022 91.9     MCH 06/23/2022 32.4     MCHC 06/23/2022 35.2     RDW 06/23/2022 11.9     PLATELET 71/10/7088 935     MPV 06/23/2022 10.0     NRBC 06/23/2022 0.0     ABSOLUTE NRBC 06/23/2022 0.00     NEUTROPHILS 06/23/2022 75     LYMPHOCYTES 06/23/2022 15     MONOCYTES 06/23/2022 8     EOSINOPHILS 06/23/2022 0     BASOPHILS 06/23/2022 1     IMMATURE GRANULOCYTES 06/23/2022 1*    ABS. NEUTROPHILS 06/23/2022 6.5     ABS. LYMPHOCYTES 06/23/2022 1.3     ABS. MONOCYTES 06/23/2022 0.7     ABS. EOSINOPHILS 06/23/2022 0.0     ABS. BASOPHILS 06/23/2022 0.0     ABS. IMM. GRANS. 06/23/2022 0.0     DF 06/23/2022 AUTOMATED     Sodium 06/23/2022 135*    Potassium 06/23/2022 3.0*    Chloride 06/23/2022 96*    CO2 06/23/2022 27     Anion gap 06/23/2022 12     Glucose 06/23/2022 90     BUN 06/23/2022 20     Creatinine 06/23/2022 0.72     BUN/Creatinine ratio 06/23/2022 28*    GFR est AA 06/23/2022 >60     GFR est non-AA 06/23/2022 >60     Calcium 06/23/2022 9.1     Bilirubin, total 06/23/2022 0.8     ALT (SGPT) 06/23/2022 20     AST (SGOT) 06/23/2022 26     Alk.  phosphatase 06/23/2022 60     Protein, total 06/23/2022 6.5     Albumin 06/23/2022 3.5     Globulin 06/23/2022 3.0     A-G Ratio 06/23/2022 1.2     Troponin-High Sensitivity 06/23/2022 13     Ventricular Rate 06/23/2022 100     Atrial Rate 06/23/2022 100     P-R Interval 06/23/2022 136     QRS Duration 06/23/2022 72     Q-T Interval 06/23/2022 292     QTC Calculation (Bezet) 06/23/2022 376     Calculated P Axis 06/23/2022 40     Calculated R Axis 06/23/2022 17     Calculated T Axis 06/23/2022 47     Diagnosis 06/23/2022                      Value:Sinus rhythm with premature supraventricular complexes  Septal infarct (cited on or before 16-SEP-2013)  When compared with ECG of 16-SEP-2013 15:11,  premature supraventricular complexes are now present  Questionable change in initial forces of Septal leads  Nonspecific T wave abnormality now evident in Lateral leads  Confirmed by Pili Davila ([id number]) on 6/24/2022 7:09:04 AM      Color 06/24/2022 YELLOW/STRAW     Appearance 06/24/2022 CLEAR     Specific gravity 06/24/2022 1.025     pH (UA) 06/24/2022 6.0     Protein 06/24/2022 Negative     Glucose 06/24/2022 Negative     Ketone 06/24/2022 40*    Bilirubin 06/24/2022 Negative     Blood 06/24/2022 Negative     Urobilinogen 06/24/2022 0.2     Nitrites 06/24/2022 Negative     Leukocyte Esterase 06/24/2022 TRACE*    WBC 06/24/2022 0-4     RBC 06/24/2022 0-5     Epithelial cells 06/24/2022 FEW     Bacteria 06/24/2022 Negative     Urine culture hold 06/24/2022 Urine on hold in Microbiology dept for 2 days. If unpreserved urine is submitted, it cannot be used for addtional testing after 24 hours, recollection will be required.  SAMPLES BEING HELD 06/23/2022 1 RED, 1 BLUE, 1 GREY, 1 DK GREEN     COMMENT 06/23/2022 Add-on orders for these samples will be processed based on acceptable specimen integrity and analyte stability, which may vary by analyte.      Sodium 06/24/2022 134*    Potassium 06/24/2022 3.1*    Chloride 06/24/2022 99     CO2 06/24/2022 27     Anion gap 06/24/2022 8     Glucose 06/24/2022 102*    BUN 06/24/2022 16     Creatinine 06/24/2022 0.58     BUN/Creatinine ratio 06/24/2022 28*    GFR est AA 06/24/2022 >60     GFR est non-AA 06/24/2022 >60     Calcium 06/24/2022 8.0*    Ventricular Rate 06/24/2022 100     Atrial Rate 06/24/2022 100     P-R Interval 06/24/2022 138     QRS Duration 06/24/2022 66     Q-T Interval 06/24/2022 322     QTC Calculation (Bezet) 06/24/2022 415     Calculated P Axis 06/24/2022 33     Calculated R Axis 06/24/2022 10     Calculated T Axis 06/24/2022 47     Diagnosis 06/24/2022                      Value:Normal sinus rhythm  Septal infarct , age undetermined  Abnormal ECG  When compared with ECG of 23-JUN-2022 20:45,  MANUAL COMPARISON REQUIRED, DATA IS UNCONFIRMED  Confirmed by Deanna Pacheco ([id number]) on 6/24/2022 7:09:40 AM      Troponin-High Sensitivity 06/24/2022 14     Specimen source 06/24/2022 Nasopharyngeal     COVID-19 rapid test 06/24/2022 Not detected     CK 06/24/2022 142     CK 06/25/2022 122     Sodium 06/25/2022 136     Potassium 06/25/2022 3.7     Chloride 06/25/2022 104     CO2 06/25/2022 19*    Anion gap 06/25/2022 13     Glucose 06/25/2022 59*    BUN 06/25/2022 12     Creatinine 06/25/2022 0.41*    BUN/Creatinine ratio 06/25/2022 29*    GFR est AA 06/25/2022 >60     GFR est non-AA 06/25/2022 >60     Calcium 06/25/2022 8.7     WBC 06/25/2022 7.5     RBC 06/25/2022 3.01*    HGB 06/25/2022 9.9*    HCT 06/25/2022 28.0*    MCV 06/25/2022 93.0     MCH 06/25/2022 32.9     MCHC 06/25/2022 35.4     RDW 06/25/2022 11.9     PLATELET 54/10/8747 918     MPV 06/25/2022 9.9     NRBC 06/25/2022 0.0     ABSOLUTE NRBC 06/25/2022 0.00     NEUTROPHILS 06/25/2022 64     LYMPHOCYTES 06/25/2022 27     MONOCYTES 06/25/2022 7     EOSINOPHILS 06/25/2022 2     BASOPHILS 06/25/2022 0     IMMATURE GRANULOCYTES 06/25/2022 0     ABS. NEUTROPHILS 06/25/2022 4.8     ABS. LYMPHOCYTES 06/25/2022 2.0     ABS. MONOCYTES 06/25/2022 0.5     ABS. EOSINOPHILS 06/25/2022 0.1     ABS. BASOPHILS 06/25/2022 0.0     ABS. IMM.  GRANS. 06/25/2022 0.0     DF 06/25/2022 AUTOMATED IMAGING:  XR HAND LT AP/LAT    Result Date: 6/24/2022  No acute fracture. XR HAND RT AP/LAT    Result Date: 6/24/2022  No significant change in alignment of fourth and fifth metacarpal neck fractures. XR HAND RT MIN 3 V    Addendum Date: 6/24/2022    Addendum: Images were incorrectly labeled as left hand. Subsequent reduction film of the right hand demonstrate that the fractures were of the right fourth and fifth metacarpals. Thus, the examination was right hand radiographs. Result Date: 6/24/2022  Acute displaced fifth metacarpal neck fracture. Acute angulated fourth metacarpal neck fracture. XR HAND RT MIN 3 V    Result Date: 6/24/2022  Acute fourth and fifth metacarpal neck fractures similar to prior study. CT HEAD WO CONT    Result Date: 6/23/2022  No evidence of acute process. CT CHEST WO CONT    Result Date: 6/23/2022  No evidence of acute process. Incidentals as above. CT SPINE CERV WO CONT    Result Date: 6/23/2022  No acute abnormality. Multilevel spondylosis. CT ABD PELV WO CONT    Result Date: 6/23/2022  No evidence of acute process. Incidentals as above. EKG:  Results for orders placed or performed in visit on 04/18/12   [facility] POC EKG ROUTINE W/ 12 LEADS, INTER & REP     Status: None    Narrative    See scanned report.                  Clinton Tracy MD  6/25/2022 4:10 PM

## 2022-06-25 NOTE — PROGRESS NOTES
Bedside and Verbal shift change report given to Jaz Robbins RN (oncoming nurse) by Felicita Camargo RN (offgoing nurse). Report included the following information SBAR, Kardex, Intake/Output, MAR, Recent Results, Cardiac Rhythm . and Alarm Parameters .

## 2022-06-25 NOTE — PROGRESS NOTES
Transition of Care    RUR: 14%    Ms. Reny Hoff was called. She had talked to the [facility]. They will take her mother back unless she has a hard cast on her hand. They are concerned that she would injure the hand without a hard cast.  Ms. Reny Hoff stated that she is planning to appeal.  The IM letter was emailed to her at [email or web address]. Will continue to follow for discharge planning.   Signed By: Baldemar Kingsley LCSW     June 25, 2022

## 2022-06-25 NOTE — PROGRESS NOTES
Bedside and Verbal shift change report given to MARCELINA Anand (oncoming nurse) by Alirio Hernandez RN (offgoing nurse). Report included the following information SBAR, Kardex, ED Summary, Procedure Summary, MAR, Recent Results and Cardiac Rhythm .

## 2022-06-26 LAB
ANION GAP SERPL CALC-SCNC: 8 MMOL/L (ref 5–15)
BUN SERPL-MCNC: 6 MG/DL (ref 6–20)
BUN/CREAT SERPL: 14 (ref 12–20)
CALCIUM SERPL-MCNC: 8.4 MG/DL (ref 8.5–10.1)
CHLORIDE SERPL-SCNC: 103 MMOL/L (ref 97–108)
CO2 SERPL-SCNC: 25 MMOL/L (ref 21–32)
CREAT SERPL-MCNC: 0.42 MG/DL (ref 0.55–1.02)
GLUCOSE SERPL-MCNC: 81 MG/DL (ref 65–100)
POTASSIUM SERPL-SCNC: 4.6 MMOL/L (ref 3.5–5.1)
SODIUM SERPL-SCNC: 136 MMOL/L (ref 136–145)

## 2022-06-26 PROCEDURE — 74011250637 HC RX REV CODE- 250/637: Performed by: HOSPITALIST

## 2022-06-26 PROCEDURE — 74011250636 HC RX REV CODE- 250/636: Performed by: FAMILY MEDICINE

## 2022-06-26 PROCEDURE — 74011000250 HC RX REV CODE- 250: Performed by: HOSPITALIST

## 2022-06-26 PROCEDURE — 65270000046 HC RM TELEMETRY

## 2022-06-26 PROCEDURE — 36415 COLL VENOUS BLD VENIPUNCTURE: CPT

## 2022-06-26 PROCEDURE — 74011250636 HC RX REV CODE- 250/636: Performed by: HOSPITALIST

## 2022-06-26 PROCEDURE — 74011250637 HC RX REV CODE- 250/637: Performed by: FAMILY MEDICINE

## 2022-06-26 PROCEDURE — 80048 BASIC METABOLIC PNL TOTAL CA: CPT

## 2022-06-26 PROCEDURE — 74011000250 HC RX REV CODE- 250: Performed by: FAMILY MEDICINE

## 2022-06-26 RX ORDER — METOPROLOL TARTRATE 5 MG/5ML
2.5 INJECTION INTRAVENOUS ONCE
Status: COMPLETED | OUTPATIENT
Start: 2022-06-26 | End: 2022-06-26

## 2022-06-26 RX ORDER — METOPROLOL TARTRATE 25 MG/1
12.5 TABLET, FILM COATED ORAL EVERY 12 HOURS
Status: DISCONTINUED | OUTPATIENT
Start: 2022-06-26 | End: 2022-06-28 | Stop reason: HOSPADM

## 2022-06-26 RX ORDER — MAGNESIUM SULFATE HEPTAHYDRATE 40 MG/ML
2 INJECTION, SOLUTION INTRAVENOUS ONCE
Status: COMPLETED | OUTPATIENT
Start: 2022-06-26 | End: 2022-06-26

## 2022-06-26 RX ORDER — SODIUM CHLORIDE, SODIUM LACTATE, POTASSIUM CHLORIDE, CALCIUM CHLORIDE 600; 310; 30; 20 MG/100ML; MG/100ML; MG/100ML; MG/100ML
50 INJECTION, SOLUTION INTRAVENOUS CONTINUOUS
Status: DISPENSED | OUTPATIENT
Start: 2022-06-26 | End: 2022-06-27

## 2022-06-26 RX ADMIN — ACETAMINOPHEN 650 MG: 325 TABLET ORAL at 21:47

## 2022-06-26 RX ADMIN — RISPERIDONE 1 MG: 0.5 TABLET ORAL at 10:01

## 2022-06-26 RX ADMIN — POTASSIUM CHLORIDE AND SODIUM CHLORIDE: 900; 150 INJECTION, SOLUTION INTRAVENOUS at 13:42

## 2022-06-26 RX ADMIN — METOPROLOL 12.5 MG: 25 TABLET ORAL at 18:07

## 2022-06-26 RX ADMIN — LEVOTHYROXINE SODIUM 150 MCG: 0.15 TABLET ORAL at 05:42

## 2022-06-26 RX ADMIN — QUETIAPINE FUMARATE 25 MG: 25 TABLET ORAL at 21:47

## 2022-06-26 RX ADMIN — SODIUM CHLORIDE, PRESERVATIVE FREE 10 ML: 5 INJECTION INTRAVENOUS at 14:36

## 2022-06-26 RX ADMIN — MAGNESIUM SULFATE IN WATER 2 G: 40 INJECTION, SOLUTION INTRAVENOUS at 14:34

## 2022-06-26 RX ADMIN — DONEPEZIL HYDROCHLORIDE 10 MG: 10 TABLET ORAL at 10:01

## 2022-06-26 RX ADMIN — AMLODIPINE BESYLATE 5 MG: 5 TABLET ORAL at 10:01

## 2022-06-26 RX ADMIN — METOPROLOL TARTRATE 2.5 MG: 5 INJECTION INTRAVENOUS at 14:34

## 2022-06-26 RX ADMIN — SODIUM CHLORIDE, POTASSIUM CHLORIDE, SODIUM LACTATE AND CALCIUM CHLORIDE 50 ML/HR: 600; 310; 30; 20 INJECTION, SOLUTION INTRAVENOUS at 18:07

## 2022-06-26 RX ADMIN — SERTRALINE 25 MG: 50 TABLET, FILM COATED ORAL at 10:01

## 2022-06-26 NOTE — PROGRESS NOTES
Patient has been restless since beginning of shift and after her Seroquel she was going to calm down but she hasn't. Constantly trying to get out of bed and when helped to the [facility] would not do anything. Tachy and sometimes would go to 140s. She has ripped out her IV  twice. Countless times has she taken out her hand brace. RN and tech have noticed IV running and disconnected from patient multiple times. Patient by some means disconnects self from IV tubing. On-call made aware.

## 2022-06-26 NOTE — PROGRESS NOTES
[facility] Progress Note    INTERVAL HISTORY  / Subjective:  She is awake and alert, she does not report any painful symptoms , she has elevated -140 at times appears to be an atrial tachycardia     Assessment / Plan:  Parker Meyers is a [de-identified] y.o. female who presents with fall      Patient has history of dementia, not much history could be obtained from the patient, history was probably obtained from patient's daughter Munira,Pt lives at [facility] was found on the floor, had a fall, was brought to [facility], patient was found hypokalemic and with metacarpal fractures, per daughter patient has significant dementia, patient was requested to be admitted to the hospitalist service. Currently patient is resting in bed, is alert x0, does not appear in any acute distress. Hypokalemia  Dehydration  --Resolved Hypokalemia, still appears to have dry skin/mucosa , resume IV Hydration and encourage oral intake      Atrial Tachycardia  --Appears A. Flutter, she appears to have no symptoms such as CP or palpitations, start IV Lopressor x1, IV Magnesium , EKG to confirm ,  start PO Lopressor     Right metacarpal fracture  --06/25 Ortho consult and recommends d/c from an ortho standpoint. Pt to f/u with Dr. Horace Leon in clinic upon d/c where she may receive a malreduction. Ortho will sign off. Please call if there are further ortho related questions.         Dementia  --Resides at a memory care unit     Hypertension  --Blood pressure in control     Hypothyroidism  --Resume Synthroid       Chief Complaint : Altered mental status        Objective:  Visit Vitals  /75 (BP 1 Location: Left upper arm, BP Patient Position: At rest)   Pulse (!) 128   Temp 98.5 °F (36.9 °C)   Resp 20   SpO2 97%                 Physical Exam:  General: No acute distress, speaking in full sentences, no use of accessory muscles   HEENT: Pupils equal and reactive to light and accommodation, oropharynx is clear   Neck: Supple, no lymphadenopathy, no JVD   Lungs: Clear to auscultation bilaterally   Cardiovascular Tachycardia with normal S1 and S2   Abdomen: Soft, nontender, nondistended, normoactive bowel sounds   Extremities: No cyanosis clubbing or edema   Neuro: Nonfocal alert   Psych: Normal affect     Intake and Output:  Date 06/25/22 0700 - 06/26/22 0659 06/26/22 0700 - 06/27/22 0659   Shift 0700-1859 1900-0659 24 Hour Total 0700-1859 1900-0659 24 Hour Total   INTAKE   P.O. 300  300        P. O. 300  300      Shift Total 300  300      OUTPUT   Urine           Urine Occurrence(s)  2 x 2 x      Shift Total           300      Weight (kg)             LAB:  Admission on 06/23/2022   Component Date Value    WBC 06/23/2022 8.5     RBC 06/23/2022 3.46*    HGB 06/23/2022 11.2*    HCT 06/23/2022 31.8*    MCV 06/23/2022 91.9     MCH 06/23/2022 32.4     MCHC 06/23/2022 35.2     RDW 06/23/2022 11.9     PLATELET 59/57/7689 664     MPV 06/23/2022 10.0     NRBC 06/23/2022 0.0     ABSOLUTE NRBC 06/23/2022 0.00     NEUTROPHILS 06/23/2022 75     LYMPHOCYTES 06/23/2022 15     MONOCYTES 06/23/2022 8     EOSINOPHILS 06/23/2022 0     BASOPHILS 06/23/2022 1     IMMATURE GRANULOCYTES 06/23/2022 1*    ABS. NEUTROPHILS 06/23/2022 6.5     ABS. LYMPHOCYTES 06/23/2022 1.3     ABS. MONOCYTES 06/23/2022 0.7     ABS. EOSINOPHILS 06/23/2022 0.0     ABS. BASOPHILS 06/23/2022 0.0     ABS. IMM. GRANS. 06/23/2022 0.0     DF 06/23/2022 AUTOMATED     Sodium 06/23/2022 135*    Potassium 06/23/2022 3.0*    Chloride 06/23/2022 96*    CO2 06/23/2022 27     Anion gap 06/23/2022 12     Glucose 06/23/2022 90     BUN 06/23/2022 20     Creatinine 06/23/2022 0.72     BUN/Creatinine ratio 06/23/2022 28*    GFR est AA 06/23/2022 >60     GFR est non-AA 06/23/2022 >60     Calcium 06/23/2022 9.1     Bilirubin, total 06/23/2022 0.8     ALT (SGPT) 06/23/2022 20     AST (SGOT) 06/23/2022 26     Alk.  phosphatase 06/23/2022 60     Protein, total 06/23/2022 6.5     Albumin 06/23/2022 3.5     Globulin 06/23/2022 3.0     A-G Ratio 06/23/2022 1.2     Troponin-High Sensitivity 06/23/2022 13     Ventricular Rate 06/23/2022 100     Atrial Rate 06/23/2022 100     P-R Interval 06/23/2022 136     QRS Duration 06/23/2022 72     Q-T Interval 06/23/2022 292     QTC Calculation (Bezet) 06/23/2022 376     Calculated P Axis 06/23/2022 40     Calculated R Axis 06/23/2022 17     Calculated T Axis 06/23/2022 47     Diagnosis 06/23/2022                      Value:Sinus rhythm with premature supraventricular complexes  Septal infarct (cited on or before 16-SEP-2013)  When compared with ECG of 16-SEP-2013 15:11,  premature supraventricular complexes are now present  Questionable change in initial forces of Septal leads  Nonspecific T wave abnormality now evident in Lateral leads  Confirmed by Jessie Rowe ([id number]) on 6/24/2022 7:09:04 AM      Color 06/24/2022 YELLOW/STRAW     Appearance 06/24/2022 CLEAR     Specific gravity 06/24/2022 1.025     pH (UA) 06/24/2022 6.0     Protein 06/24/2022 Negative     Glucose 06/24/2022 Negative     Ketone 06/24/2022 40*    Bilirubin 06/24/2022 Negative     Blood 06/24/2022 Negative     Urobilinogen 06/24/2022 0.2     Nitrites 06/24/2022 Negative     Leukocyte Esterase 06/24/2022 TRACE*    WBC 06/24/2022 0-4     RBC 06/24/2022 0-5     Epithelial cells 06/24/2022 FEW     Bacteria 06/24/2022 Negative     Urine culture hold 06/24/2022 Urine on hold in Microbiology dept for 2 days. If unpreserved urine is submitted, it cannot be used for addtional testing after 24 hours, recollection will be required.  SAMPLES BEING HELD 06/23/2022 1 RED, 1 BLUE, 1 GREY, 1 DK GREEN     COMMENT 06/23/2022 Add-on orders for these samples will be processed based on acceptable specimen integrity and analyte stability, which may vary by analyte.      Sodium 06/24/2022 134*    Potassium 06/24/2022 3.1*    Chloride 06/24/2022 99     CO2 06/24/2022 27     Anion gap 06/24/2022 8     Glucose 06/24/2022 102*    BUN 06/24/2022 16     Creatinine 06/24/2022 0.58     BUN/Creatinine ratio 06/24/2022 28*    GFR est AA 06/24/2022 >60     GFR est non-AA 06/24/2022 >60     Calcium 06/24/2022 8.0*    Ventricular Rate 06/24/2022 100     Atrial Rate 06/24/2022 100     P-R Interval 06/24/2022 138     QRS Duration 06/24/2022 66     Q-T Interval 06/24/2022 322     QTC Calculation (Bezet) 06/24/2022 415     Calculated P Axis 06/24/2022 33     Calculated R Axis 06/24/2022 10     Calculated T Axis 06/24/2022 47     Diagnosis 06/24/2022                      Value:Normal sinus rhythm  Septal infarct , age undetermined  Abnormal ECG  When compared with ECG of 23-JUN-2022 20:45,  MANUAL COMPARISON REQUIRED, DATA IS UNCONFIRMED  Confirmed by Oumar Alvarez ([id number]) on 6/24/2022 7:09:40 AM      Troponin-High Sensitivity 06/24/2022 14     Special Requests: 06/24/2022 NO SPECIAL REQUESTS     Culture result: 06/24/2022 No growth (<1,000 CFU/ML)     Specimen source 06/24/2022 Nasopharyngeal     COVID-19 rapid test 06/24/2022 Not detected     CK 06/24/2022 142     CK 06/25/2022 122     Sodium 06/25/2022 136     Potassium 06/25/2022 3.7     Chloride 06/25/2022 104     CO2 06/25/2022 19*    Anion gap 06/25/2022 13     Glucose 06/25/2022 59*    BUN 06/25/2022 12     Creatinine 06/25/2022 0.41*    BUN/Creatinine ratio 06/25/2022 29*    GFR est AA 06/25/2022 >60     GFR est non-AA 06/25/2022 >60     Calcium 06/25/2022 8.7     WBC 06/25/2022 7.5     RBC 06/25/2022 3.01*    HGB 06/25/2022 9.9*    HCT 06/25/2022 28.0*    MCV 06/25/2022 93.0     MCH 06/25/2022 32.9     MCHC 06/25/2022 35.4     RDW 06/25/2022 11.9     PLATELET 64/47/6257 540     MPV 06/25/2022 9.9     NRBC 06/25/2022 0.0     ABSOLUTE NRBC 06/25/2022 0.00     NEUTROPHILS 06/25/2022 64     LYMPHOCYTES 06/25/2022 27     MONOCYTES 06/25/2022 7     EOSINOPHILS 06/25/2022 2     BASOPHILS 06/25/2022 0     IMMATURE GRANULOCYTES 06/25/2022 0     ABS. NEUTROPHILS 06/25/2022 4.8     ABS. LYMPHOCYTES 06/25/2022 2.0     ABS. MONOCYTES 06/25/2022 0.5     ABS. EOSINOPHILS 06/25/2022 0.1     ABS. BASOPHILS 06/25/2022 0.0     ABS. IMM. GRANS. 06/25/2022 0.0     DF 06/25/2022 AUTOMATED     Sodium 06/26/2022 136     Potassium 06/26/2022 4.6     Chloride 06/26/2022 103     CO2 06/26/2022 25     Anion gap 06/26/2022 8     Glucose 06/26/2022 81     BUN 06/26/2022 6     Creatinine 06/26/2022 0.42*    BUN/Creatinine ratio 06/26/2022 14     GFR est AA 06/26/2022 >60     GFR est non-AA 06/26/2022 >60     Calcium 06/26/2022 8.4*       IMAGING:  XR HAND LT AP/LAT    Result Date: 6/24/2022  No acute fracture. XR HAND RT AP/LAT    Result Date: 6/24/2022  No significant change in alignment of fourth and fifth metacarpal neck fractures. XR HAND RT MIN 3 V    Addendum Date: 6/24/2022    Addendum: Images were incorrectly labeled as left hand. Subsequent reduction film of the right hand demonstrate that the fractures were of the right fourth and fifth metacarpals. Thus, the examination was right hand radiographs. Result Date: 6/24/2022  Acute displaced fifth metacarpal neck fracture. Acute angulated fourth metacarpal neck fracture. XR HAND RT MIN 3 V    Result Date: 6/24/2022  Acute fourth and fifth metacarpal neck fractures similar to prior study. CT HEAD WO CONT    Result Date: 6/23/2022  No evidence of acute process. CT CHEST WO CONT    Result Date: 6/23/2022  No evidence of acute process. Incidentals as above. CT SPINE CERV WO CONT    Result Date: 6/23/2022  No acute abnormality. Multilevel spondylosis. CT ABD PELV WO CONT    Result Date: 6/23/2022  No evidence of acute process. Incidentals as above.       EKG:  Results for orders placed or performed in visit on 04/18/12   AMB POC EKG ROUTINE W/ 12 LEADS, INTER & REP     Status: None Narrative    See scanned report.                  Claudeen Arenas, MD  6/26/2022 4:10 PM

## 2022-06-26 NOTE — ROUTINE PROCESS
Bedside and Verbal shift change report given to MARCELINA Anand (oncoming nurse) by Gucci Carmen (offgoing nurse). Report included the following information SBAR, Kardex, Intake/Output and MAR.

## 2022-06-26 NOTE — PROGRESS NOTES
Bedside and Verbal shift change report given to Cheng HEWITT (oncoming nurse) by Mani Barnhart (offgoing nurse). Report included the following information SBAR, Kardex, Intake/Output, MAR and Recent Results.

## 2022-06-27 LAB
GLUCOSE BLD STRIP.AUTO-MCNC: 123 MG/DL (ref 65–117)
GLUCOSE BLD STRIP.AUTO-MCNC: 53 MG/DL (ref 65–117)
GLUCOSE BLD STRIP.AUTO-MCNC: 56 MG/DL (ref 65–117)
SERVICE CMNT-IMP: ABNORMAL

## 2022-06-27 PROCEDURE — 74011000250 HC RX REV CODE- 250: Performed by: FAMILY MEDICINE

## 2022-06-27 PROCEDURE — 74011000250 HC RX REV CODE- 250

## 2022-06-27 PROCEDURE — 82962 GLUCOSE BLOOD TEST: CPT

## 2022-06-27 PROCEDURE — 74011250637 HC RX REV CODE- 250/637: Performed by: FAMILY MEDICINE

## 2022-06-27 PROCEDURE — 74011250637 HC RX REV CODE- 250/637: Performed by: HOSPITALIST

## 2022-06-27 PROCEDURE — 65270000046 HC RM TELEMETRY

## 2022-06-27 RX ORDER — DEXTROSE MONOHYDRATE 100 MG/ML
INJECTION, SOLUTION INTRAVENOUS
Status: COMPLETED
Start: 2022-06-27 | End: 2022-06-27

## 2022-06-27 RX ORDER — METOPROLOL SUCCINATE 25 MG/1
25 TABLET, EXTENDED RELEASE ORAL DAILY
Qty: 30 TABLET | Refills: 0 | Status: SHIPPED | OUTPATIENT
Start: 2022-06-27

## 2022-06-27 RX ADMIN — METOPROLOL 12.5 MG: 25 TABLET ORAL at 08:56

## 2022-06-27 RX ADMIN — QUETIAPINE FUMARATE 25 MG: 25 TABLET ORAL at 21:00

## 2022-06-27 RX ADMIN — METOPROLOL 12.5 MG: 25 TABLET ORAL at 21:00

## 2022-06-27 RX ADMIN — AMLODIPINE BESYLATE 5 MG: 5 TABLET ORAL at 08:56

## 2022-06-27 RX ADMIN — SODIUM CHLORIDE, PRESERVATIVE FREE 10 ML: 5 INJECTION INTRAVENOUS at 21:00

## 2022-06-27 RX ADMIN — DONEPEZIL HYDROCHLORIDE 10 MG: 10 TABLET ORAL at 08:56

## 2022-06-27 RX ADMIN — DEXTROSE MONOHYDRATE 125 ML: 10 INJECTION, SOLUTION INTRAVENOUS at 23:15

## 2022-06-27 RX ADMIN — SERTRALINE 25 MG: 50 TABLET, FILM COATED ORAL at 08:56

## 2022-06-27 RX ADMIN — RISPERIDONE 1 MG: 0.5 TABLET ORAL at 08:56

## 2022-06-27 RX ADMIN — SODIUM CHLORIDE, PRESERVATIVE FREE 10 ML: 5 INJECTION INTRAVENOUS at 14:00

## 2022-06-27 RX ADMIN — LEVOTHYROXINE SODIUM 150 MCG: 0.15 TABLET ORAL at 06:12

## 2022-06-27 NOTE — PROGRESS NOTES
Transition of Care  1. RUR 14% Low  2. Disposition SNF-pending acceptance  3. DME NA  4. Transportation BLS  5. Follow Up PCP, Specialist      CM placed call to patient's daughter Jarret Ham [phone] to discuss transitional care plan. CM advised that patient is recommended for SNF. CM explained this referral process and agreed to email SNF list. Calleen Kussmaul will review and provide 5 choices but is also in agreement with CM sending medicals to [facility] for their review and if they can accept patient back, [facility]. CM also messaged PA with Ortho surgery with request to follow up with daughter as she had questions about patient's injury to her hand. *IM letter emailed to daughter on 6/25/22 @ 10:19 am from weekend CM as daughter had plans to appeal DC over the weekend* CM to monitor. Marie Estrada, MS    1:08 CM placed call to [facility]  [phone] and spoke to [place], [place]. CM reviewed last PT note. [facility] stated that patient was not walking safely prior hospital admission so they would recommend rehab before returning to memory care. CM will still fax updated medicals to [facility] at [phone]. [facility] states that their  Heike Jesus will reach out to daughter to assist with SNF choices/referrals. CM to await choice to send referrals. *Patient had a negative rapid COVID test on 6/24/22, she has received Pfizer vaccination series and one booster: March 4, 2021; March 25, 2021; 10/29/21*    Marie Estrada MS    4:08 Call placed to daughter Rodrick Polo was awaiting recommendations from [facility] but has yet to receive any. Laurita Vicente agreeable to CM placing referrals to [facility],[address], [address]. Referrals placed via [facility] and [email or web address]. Updated attending that DC will be delayed until tomorrow.      Marie Estrada, MS

## 2022-06-27 NOTE — PROGRESS NOTES
[facility] Progress Note    INTERVAL HISTORY  / Subjective: Tachycardia resolved now, he    Assessment / Plan:  Norma Oswald is a [de-identified] y.o. female who presents with fall      Patient has history of dementia, not much history could be obtained from the patient, history was probably obtained from patient's daughter Munira,Pt lives at [address] was found on the floor, had a fall, was brought to [facility], patient was found hypokalemic and with metacarpal fractures, per daughter patient has significant dementia, patient was requested to be admitted to the hospitalist service. Currently patient is resting in bed, is alert x0, does not appear in any acute distress. Hypokalemia  Dehydration  --Resolved Hypokalemia, still appears to have dry skin/mucosa , resume IV Hydration and encourage oral intake      Atrial Tachycardia  --06/26 Appears A. Flutter, she appears to have no symptoms such as CP or palpitations, start IV Lopressor x1, IV Magnesium , EKG to confirm ,  start PO Lopressor   --06/27 HR in normal range, resume PO Lopressor, no overnight events on Tele     Right metacarpal fracture  -- Ortho consult and recommends d/c from an ortho standpoint. Pt to f/u with Dr. Kayode Alonzo in clinic upon d/c where she may receive a malreduction. Ortho will sign off. Please call if there are further ortho related questions.         Dementia  --Resides at a memory care unit     Hypertension  --Blood pressure in control     Hypothyroidism  --Resume Synthroid     Discharge Disposition  --06/27 Medically stable for discharge       Chief Complaint : Altered mental status        Objective:  Visit Vitals  BP (!) 108/57 (BP 1 Location: Left upper arm, BP Patient Position: At rest)   Pulse 72   Temp 98 °F (36.7 °C)   Resp 15   SpO2 98%                 Physical Exam:  General: No acute distress   HEENT: Pupils equal and reactive to light and accommodation, oropharynx is clear   Neck: Supple, no lymphadenopathy, no JVD   Lungs: Clear to auscultation bilaterally   Cardiovascular Normal rate with normal S1 and S2   Abdomen: Soft, nontender, nondistended, normoactive bowel sounds   Extremities: No cyanosis clubbing or edema   Neuro: Nonfocal alert       Intake and Output:  Date 06/26/22 0700 - 06/27/22 0659 06/27/22 0700 - 06/28/22 0659   Shift 1294-7430 0723-2595 24 Hour Total 3273-1367 5561-2133 24 Hour Total   INTAKE   I.V.    1052.5  1052.5     Volume (lactated Ringers infusion)    1052.5  1052.5   Shift Total    1052.5  1052.5   OUTPUT   Shift Total         NET    1052.5  1052.5   Weight (kg)             LAB:  Admission on 06/23/2022   Component Date Value    WBC 06/23/2022 8.5     RBC 06/23/2022 3.46*    HGB 06/23/2022 11.2*    HCT 06/23/2022 31.8*    MCV 06/23/2022 91.9     MCH 06/23/2022 32.4     MCHC 06/23/2022 35.2     RDW 06/23/2022 11.9     PLATELET 33/82/4966 038     MPV 06/23/2022 10.0     NRBC 06/23/2022 0.0     ABSOLUTE NRBC 06/23/2022 0.00     NEUTROPHILS 06/23/2022 75     LYMPHOCYTES 06/23/2022 15     MONOCYTES 06/23/2022 8     EOSINOPHILS 06/23/2022 0     BASOPHILS 06/23/2022 1     IMMATURE GRANULOCYTES 06/23/2022 1*    ABS. NEUTROPHILS 06/23/2022 6.5     ABS. LYMPHOCYTES 06/23/2022 1.3     ABS. MONOCYTES 06/23/2022 0.7     ABS. EOSINOPHILS 06/23/2022 0.0     ABS. BASOPHILS 06/23/2022 0.0     ABS. IMM. GRANS. 06/23/2022 0.0     DF 06/23/2022 AUTOMATED     Sodium 06/23/2022 135*    Potassium 06/23/2022 3.0*    Chloride 06/23/2022 96*    CO2 06/23/2022 27     Anion gap 06/23/2022 12     Glucose 06/23/2022 90     BUN 06/23/2022 20     Creatinine 06/23/2022 0.72     BUN/Creatinine ratio 06/23/2022 28*    GFR est AA 06/23/2022 >60     GFR est non-AA 06/23/2022 >60     Calcium 06/23/2022 9.1     Bilirubin, total 06/23/2022 0.8     ALT (SGPT) 06/23/2022 20     AST (SGOT) 06/23/2022 26     Alk.  phosphatase 06/23/2022 60     Protein, total 06/23/2022 6.5     Albumin 06/23/2022 3.5     Globulin 06/23/2022 3.0     A-G Ratio 06/23/2022 1.2     Troponin-High Sensitivity 06/23/2022 13     Ventricular Rate 06/23/2022 100     Atrial Rate 06/23/2022 100     P-R Interval 06/23/2022 136     QRS Duration 06/23/2022 72     Q-T Interval 06/23/2022 292     QTC Calculation (Bezet) 06/23/2022 376     Calculated P Axis 06/23/2022 40     Calculated R Axis 06/23/2022 17     Calculated T Axis 06/23/2022 47     Diagnosis 06/23/2022                      Value:Sinus rhythm with premature supraventricular complexes  Septal infarct (cited on or before 16-SEP-2013)  When compared with ECG of 16-SEP-2013 15:11,  premature supraventricular complexes are now present  Questionable change in initial forces of Septal leads  Nonspecific T wave abnormality now evident in Lateral leads  Confirmed by Nikole Staton ([id number]) on 6/24/2022 7:09:04 AM      Color 06/24/2022 YELLOW/STRAW     Appearance 06/24/2022 CLEAR     Specific gravity 06/24/2022 1.025     pH (UA) 06/24/2022 6.0     Protein 06/24/2022 Negative     Glucose 06/24/2022 Negative     Ketone 06/24/2022 40*    Bilirubin 06/24/2022 Negative     Blood 06/24/2022 Negative     Urobilinogen 06/24/2022 0.2     Nitrites 06/24/2022 Negative     Leukocyte Esterase 06/24/2022 TRACE*    WBC 06/24/2022 0-4     RBC 06/24/2022 0-5     Epithelial cells 06/24/2022 FEW     Bacteria 06/24/2022 Negative     Urine culture hold 06/24/2022 Urine on hold in Microbiology dept for 2 days. If unpreserved urine is submitted, it cannot be used for addtional testing after 24 hours, recollection will be required.  SAMPLES BEING HELD 06/23/2022 1 RED, 1 BLUE, 1 GREY, 1 DK GREEN     COMMENT 06/23/2022 Add-on orders for these samples will be processed based on acceptable specimen integrity and analyte stability, which may vary by analyte.      Sodium 06/24/2022 134*    Potassium 06/24/2022 3.1*    Chloride 06/24/2022 99     CO2 06/24/2022 27     Anion gap 06/24/2022 8     Glucose 06/24/2022 102*    BUN 06/24/2022 16     Creatinine 06/24/2022 0.58     BUN/Creatinine ratio 06/24/2022 28*    GFR est AA 06/24/2022 >60     GFR est non-AA 06/24/2022 >60     Calcium 06/24/2022 8.0*    Ventricular Rate 06/24/2022 100     Atrial Rate 06/24/2022 100     P-R Interval 06/24/2022 138     QRS Duration 06/24/2022 66     Q-T Interval 06/24/2022 322     QTC Calculation (Bezet) 06/24/2022 415     Calculated P Axis 06/24/2022 33     Calculated R Axis 06/24/2022 10     Calculated T Axis 06/24/2022 47     Diagnosis 06/24/2022                      Value:Normal sinus rhythm  Septal infarct , age undetermined  Abnormal ECG  When compared with ECG of 23-JUN-2022 20:45,  MANUAL COMPARISON REQUIRED, DATA IS UNCONFIRMED  Confirmed by Adelfo Castaneda ([id number]) on 6/24/2022 7:09:40 AM      Troponin-High Sensitivity 06/24/2022 14     Special Requests: 06/24/2022 NO SPECIAL REQUESTS     Culture result: 06/24/2022 No growth (<1,000 CFU/ML)     Specimen source 06/24/2022 Nasopharyngeal     COVID-19 rapid test 06/24/2022 Not detected     CK 06/24/2022 142     CK 06/25/2022 122     Sodium 06/25/2022 136     Potassium 06/25/2022 3.7     Chloride 06/25/2022 104     CO2 06/25/2022 19*    Anion gap 06/25/2022 13     Glucose 06/25/2022 59*    BUN 06/25/2022 12     Creatinine 06/25/2022 0.41*    BUN/Creatinine ratio 06/25/2022 29*    GFR est AA 06/25/2022 >60     GFR est non-AA 06/25/2022 >60     Calcium 06/25/2022 8.7     WBC 06/25/2022 7.5     RBC 06/25/2022 3.01*    HGB 06/25/2022 9.9*    HCT 06/25/2022 28.0*    MCV 06/25/2022 93.0     MCH 06/25/2022 32.9     MCHC 06/25/2022 35.4     RDW 06/25/2022 11.9     PLATELET 24/38/4075 733     MPV 06/25/2022 9.9     NRBC 06/25/2022 0.0     ABSOLUTE NRBC 06/25/2022 0.00     NEUTROPHILS 06/25/2022 64     LYMPHOCYTES 06/25/2022 27     MONOCYTES 06/25/2022 7     EOSINOPHILS 06/25/2022 2     BASOPHILS 06/25/2022 0     IMMATURE GRANULOCYTES 06/25/2022 0     ABS. NEUTROPHILS 06/25/2022 4.8     ABS. LYMPHOCYTES 06/25/2022 2.0     ABS. MONOCYTES 06/25/2022 0.5     ABS. EOSINOPHILS 06/25/2022 0.1     ABS. BASOPHILS 06/25/2022 0.0     ABS. IMM. GRANS. 06/25/2022 0.0     DF 06/25/2022 AUTOMATED     Sodium 06/26/2022 136     Potassium 06/26/2022 4.6     Chloride 06/26/2022 103     CO2 06/26/2022 25     Anion gap 06/26/2022 8     Glucose 06/26/2022 81     BUN 06/26/2022 6     Creatinine 06/26/2022 0.42*    BUN/Creatinine ratio 06/26/2022 14     GFR est AA 06/26/2022 >60     GFR est non-AA 06/26/2022 >60     Calcium 06/26/2022 8.4*       IMAGING:  XR HAND LT AP/LAT    Result Date: 6/24/2022  No acute fracture. XR HAND RT AP/LAT    Result Date: 6/24/2022  No significant change in alignment of fourth and fifth metacarpal neck fractures. XR HAND RT MIN 3 V    Addendum Date: 6/24/2022    Addendum: Images were incorrectly labeled as left hand. Subsequent reduction film of the right hand demonstrate that the fractures were of the right fourth and fifth metacarpals. Thus, the examination was right hand radiographs. Result Date: 6/24/2022  Acute displaced fifth metacarpal neck fracture. Acute angulated fourth metacarpal neck fracture. XR HAND RT MIN 3 V    Result Date: 6/24/2022  Acute fourth and fifth metacarpal neck fractures similar to prior study. CT HEAD WO CONT    Result Date: 6/23/2022  No evidence of acute process. CT CHEST WO CONT    Result Date: 6/23/2022  No evidence of acute process. Incidentals as above. CT SPINE CERV WO CONT    Result Date: 6/23/2022  No acute abnormality. Multilevel spondylosis. CT ABD PELV WO CONT    Result Date: 6/23/2022  No evidence of acute process. Incidentals as above. EKG:  Results for orders placed or performed in visit on 04/18/12   AMB POC EKG ROUTINE W/ 12 LEADS, INTER & REP     Status: None    Narrative    See scanned report.                  Pina Payan Ivana Perales MD  6/27/2022 4:10 PM

## 2022-06-27 NOTE — PROGRESS NOTES
0900: RN gave pt AM meds. RN offered to help pt sit up and eat breakfast. Pt refused. Pt said she would like to rest. Will attempt again later. 0930: PCT offered to help pt eat breakfast. Pt refused. 1020: RN gave daughter, Zhao De Dios, update. Bedside shift change report given to MARCELINA Rao (oncoming nurse) by Naima Akins RN (offgoing nurse). Report included the following information SBAR, Kardex, MAR, Recent Results and Cardiac Rhythm NSR.

## 2022-06-27 NOTE — PROGRESS NOTES
Bedside and Verbal shift change report given to Balbir Crump RN (oncoming nurse) by Katrina Aguayo RN (offgoing nurse). Report included the following information SBAR, Kardex, Intake/Output, MAR, Recent Results, Cardiac Rhythm . and Alarm Parameters .

## 2022-06-28 VITALS
DIASTOLIC BLOOD PRESSURE: 72 MMHG | HEART RATE: 91 BPM | OXYGEN SATURATION: 96 % | RESPIRATION RATE: 16 BRPM | TEMPERATURE: 97.6 F | WEIGHT: 117.95 LBS | SYSTOLIC BLOOD PRESSURE: 147 MMHG | BODY MASS INDEX: 20.89 KG/M2

## 2022-06-28 PROCEDURE — 74011000250 HC RX REV CODE- 250: Performed by: FAMILY MEDICINE

## 2022-06-28 PROCEDURE — 74011250637 HC RX REV CODE- 250/637: Performed by: HOSPITALIST

## 2022-06-28 PROCEDURE — 74011250637 HC RX REV CODE- 250/637: Performed by: FAMILY MEDICINE

## 2022-06-28 RX ADMIN — LEVOTHYROXINE SODIUM 150 MCG: 0.15 TABLET ORAL at 06:00

## 2022-06-28 RX ADMIN — RISPERIDONE 1 MG: 0.5 TABLET ORAL at 08:25

## 2022-06-28 RX ADMIN — DONEPEZIL HYDROCHLORIDE 10 MG: 10 TABLET ORAL at 08:25

## 2022-06-28 RX ADMIN — AMLODIPINE BESYLATE 5 MG: 5 TABLET ORAL at 08:25

## 2022-06-28 RX ADMIN — SERTRALINE 25 MG: 50 TABLET, FILM COATED ORAL at 08:25

## 2022-06-28 RX ADMIN — SODIUM CHLORIDE, PRESERVATIVE FREE 10 ML: 5 INJECTION INTRAVENOUS at 06:00

## 2022-06-28 RX ADMIN — METOPROLOL 12.5 MG: 25 TABLET ORAL at 08:25

## 2022-06-28 NOTE — PROGRESS NOTES
TRANSFER - OUT REPORT:    Verbal report given to DIMA (name) on [facility] [place]  being transferred to [address] [place] [address] for SNF/Rehab     Report consisted of patients Situation, Background, Assessment and   Recommendations(SBAR). Information from the following report(s) SBAR and Kardex was reviewed with the receiving nurse. Opportunity for questions and clarification was provided.       Patient transported with:  SHIRLEY

## 2022-06-28 NOTE — PROGRESS NOTES
Transition of Care Plan to SNF/Rehab    SNF/Rehab Transition:  Patient has been accepted to [address] [place] [address] and meets criteria for admission. Patient will transported by [facility] and expected to leave at 10:00 am.    Communication to Patient/Family:  Contacted patient's daughter Jimy Moe and she is agreeable to the transition plan. Communication to SNF/Rehab:  Bedside RN has been notified to update the transition plan to the facility and call report [phone], ext [phone], assigned to room 221. Discharge information has been updated on the AVS.     Discharge instructions to be fax'd to facility at [phone]. Nursing Please include all hard scripts for controlled substances, med rec and dc summary, and AVS in packet. Reviewed and confirmed with [facility] facility can manage the patient care needs for the following:     SNF/Rehab Transition:  Patient to follow-up with: PCP, Specialist     [place] Records with (X) only those applicable:    Medication:  [x]  Medications will be available at the facility  []  IV Antibiotics   [x]  Controlled Substance - hard copy to be sent with patient   []  Weekly Labs   Documents:  [x] Hard RX  [x] MAR  [x] Kardex  [x] AVS  [x]Transfer Summary  [x]Discharge   Equipment:  []  CPAP/BiPAP  []  Wound Vacuum  []  Mchugh or Urinary Device  []  PICC/Central Line  []  Nebulizer  []  Ventilator   Treatment:  []Isolation (for MRSA, VRE, etc.)  []Surgical Drain Management  []Tracheostomy Care  []Dressing Changes  []Dialysis with transportation and chair time   []PEG Care  []Oxygen  []Daily Weights for Heart Failure   Dietary:  []Any diet limitations  []Tube Feedings   []Total Parenteral Management (TPN)   Eligible for Medicaid Long Term Services and Supports  Yes:  [] Eligible for medical assistance or will become eligible within 180 days and UAI completed. [] Provider/Patient and/or support system has requested screening. [] UAI copy provided to patient or responsible party.   [] UAI unavailable at discharge will send once processed to SNF provider. [] UAI unavailable at discharged mailed to patient  No:   [x] Private pay and is not financially eligible for Medicaid within the next 180 days. [] Reside out-of-state. [] A residents of a state owned/operated facility that is licensed  by [facility] or [facility]  [] Enrollment in [facility] hospice services  [] [address]  [] Patient /Family declines to have screening completed or provide financial information for screening     Financial Resources:  Medicaid    [] Initiated and application pending   [] Full coverage     Advanced Care Plan:  []Surrogate Decision Maker of Care  []POA  []Communicated Code Status DNR    Other         Medicare pt has received, reviewed, and signed 2nd IM letter informing them of their right to appeal the discharge. Signed copy has been placed on pt bedside chart.     Daniel Marcial MS

## 2022-06-28 NOTE — DISCHARGE SUMMARY
[facility]    Patient ID:  Deneen Kline female. 1940.  [id number]    Admit date: 6/23/2022  8:18 PM    Discharge date: 06/28/22     Admitting Physician: Joseph Medina DO  Attending Physician: No att. providers found  Primary Care Physician: Vladimir Avery MD     Discharge Physician: Viridiana Marroquin MD    Discharged Condition: Stable    Indication for Admission:   Chief Complaint   Patient presents with    Altered mental status        Reason for hospitalization:   Patient Active Problem List   Diagnosis Code    HTN (hypertension) I10    Hypothyroid E03.9    OA (osteoarthritis) M19.90    GERD (gastroesophageal reflux disease) K21.9    Osteoporosis M81.0    Left knee DJD M17.12    Tachycardia R00.0    DDD (degenerative disc disease), lumbar M51.36    Bronchitis J40    Bronchospasm with bronchitis, acute J20.9    Asthma J45.909    Advance care planning Z71.89    Advanced care planning/counseling discussion Z71.89    History of right shoulder replacement Z96.611    Dementia without behavioral disturbance ([facility] [address] F03.90    Hypokalemia E87.6       Discharge Diagnosis: Dehydration, Hypokalemia, right metacarpal fracture  Discharge Disposition: Discharge to skilled nursing facility  Follow up Instructions: With primary care, and orthopedic surgery  Did Patient have Sepsis (YES OR NO): No     Hospital Course:     a [de-identified] y.o. female who presents with fall. The  Patient has history of dementia, noted on admission  not much history could be obtained from the patient, history was probably obtained from patient's daughter Anthony Feldman. The patient  l[address] at [address] was found on the floor, had a fall, was [facility], patient was found hypokalemic and with metacarpal fractures, per her daughter the  patient has significant dementia.   She was noted to be dehydrated with hypokalemia, she was managed with IV hydration and electrolyte replacement, and to encourage oral intake, eventually her hydration status is improved, hypokalemia had resolved, regarding the right metacarpal fracture orthopedic surgery was consulted and they recommend nonweightbearing of the right upper extremity and was cleared for discharge from an orthopedic standpoint and to follow-up with   Dr. Binh Albert in clinic upon d/c where she may receive a malreduction. She was also noted to have tachycardia atrial tachydysrhythmia, she was started on p.o. Lopressor and her heart rate became in normal ranges, her dementia was stable and not resulting in any behaviors disturbances, she is medically optimized discharged with primary care and orthopedic surgery follow-up. Case management help assist with placement into skilled nursing facility.            Discharge Exam:  Visit Vitals  BP (!) 147/72 (BP 1 Location: Left upper arm, BP Patient Position: At rest)   Pulse 91   Temp 97.6 °F (36.4 °C)   Resp 16   Wt 53.5 kg (117 lb 15.1 oz)   SpO2 96%   BMI 20.89 kg/m²        General: No acute distress   HEENT: Pupils equal and reactive to light and accommodation, oropharynx is clear   Neck: Supple, no lymphadenopathy, no JVD   Lungs: Clear to auscultation bilaterally   Cardiovascular Normal rate with normal S1 and S2   Abdomen: Soft, nontender, nondistended, normoactive bowel sounds   Extremities: No cyanosis clubbing or edema   Neuro: Nonfocal alert     Consults: IP CONSULT TO ORTHOPEDIC SURGERY    Code Status: Prior    Significant Diagnostic Studies:   CMP: No results found for: NA, K, CL, CO2, AGAP, GLU, BUN, CREA, GFRAA, GFRNA, CA, MG, PHOS, ALB, TBIL, TBILI, TP, ALB, GLOB, AGRAT, ALT      CBC:  No results found for: WBC, HGB, HCT, PLT, HGBEXT, HCTEXT, PLTEXT    No results found for: INR, PTMR, PTP, PT1, PT2, INREXT    ABG:  No results found for: PH, PHI, PCO2, PCO2I, PO2, PO2I, HCO3, HCO3I, FIO2, FIO2I        Lab Results   Component Value Date/Time     06/25/2022 04:00 AM    CK - MB 3.6 09/16/2013 03:00 PM    CK-MB Index 3.6 (H) 09/16/2013 03:00 PM           Radiology Reports :   [unfilled]      Discharge Medications:  Cannot display discharge medications since this patient is not currently admitted. Medications Discontinued during this hospitalization:   Medications Discontinued During This Encounter   Medication Reason    potassium chloride SR (KLOR-CON 10) tablet 20 mEq     0.9% sodium chloride with KCl 20 mEq/L infusion     losartan-hydroCHLOROthiazide (HYZAAR) 100-12.5 mg per tablet Discontinued at Discharge    metoprolol tartrate (LOPRESSOR) tablet 12.5 mg Patient Discharge    sertraline (ZOLOFT) tablet 25 mg Patient Discharge    risperiDONE (RisperDAL) tablet 1 mg Patient Discharge    QUEtiapine (SEROquel) tablet 25 mg Patient Discharge    levothyroxine (SYNTHROID) tablet 150 mcg Patient Discharge    donepeziL (ARICEPT) tablet 10 mg Patient Discharge    amLODIPine (NORVASC) tablet 5 mg Patient Discharge    acetaminophen (TYLENOL) tablet 650 mg Patient Discharge    sodium chloride (NS) flush 5-40 mL Patient Discharge    sodium chloride (NS) flush 5-40 mL Patient Discharge       Patient Instructions: Activity: as tolerated. Diet:   No diet orders on file    Therapy Ordered:  No therapy plan of the specified type found. Follow-up appointments: Follow-up Appointments   Procedures    FOLLOW UP VISIT Appointment in: Other (Specify) With Primary Care, And Orthopedic Surgery     With Primary Care, And Orthopedic Surgery     Standing Status:   Standing     Number of Occurrences:   1     Order Specific Question:   Appointment in     Answer: Other (Specify)       Time Spent on Discharge greater than 30 minutes.     Jocelynn Park MD  6/28/2022 4:24 PM

## 2022-06-29 ENCOUNTER — PATIENT OUTREACH (OUTPATIENT)
Dept: CASE MANAGEMENT | Age: 82
End: 2022-06-29

## 2022-06-29 NOTE — PROGRESS NOTES
Transition of care outreach postponed for 14 days due to patient's discharge to SNF. Per EMR patient discharged to [address], will continue to monitor patient progress.

## 2022-07-08 ENCOUNTER — PATIENT OUTREACH (OUTPATIENT)
Dept: CASE MANAGEMENT | Age: 82
End: 2022-07-08

## 2022-07-08 NOTE — PROGRESS NOTES
Post Acute Facility Update     *The information contained in this note was received during a weekly care coordination call with the post acute facility*    Current Facility: [address] [place] [address] ([facility])  Anticipated Discharge Date: TBD    Facility Nursing Update: candy dawson   Facility Social Work Update: Per assessment of [facility],pending d/c location  Bundle Program Status: none  Upper Extremity Assistance: Minimal Assistance   Lower Extremity Assistance: Minimal Assistance   Bed Mobility: Independent  Transfers: Independent  Ambulation: Contact Guard Assist - hands on patient for balance   How far (in feet) is the patient ambulating?  500  Device Used: none   Barriers to Discharge: assessment by ARAVIND  Other: [facility], [employer]

## 2022-07-14 ENCOUNTER — PATIENT OUTREACH (OUTPATIENT)
Dept: CASE MANAGEMENT | Age: 82
End: 2022-07-14

## 2022-07-14 NOTE — PROGRESS NOTES
Outgoing call placed to [facility] SNF regarding update of patient progress informed patient was discharge date not given. Per EMR patient returned to [address] 2nd call placed to [facility] spoke to Mansoor Garcia (staff member) patient identified utilizing 2 identifiers. Introduced self and reason for the call. Mansoor Garcia confirms patient has return to the facility. Call placed to healthcare unit no answer message left with this writer's contact information, no response. Will follow up with facility tomorrow.

## 2022-07-15 ENCOUNTER — PATIENT OUTREACH (OUTPATIENT)
Dept: CASE MANAGEMENT | Age: 82
End: 2022-07-15

## 2022-07-15 NOTE — PROGRESS NOTES
[address] [place] [address] Dr Discharge Note    *The information contained in this note was received during a weekly care coordination call with the post acute facility*      Patient was discharged from [facility] ([facility]) on 7/8/2022 to [address]. PCP: MD ZURI FeltonS appointment: Will be seen by in-house medical provider     Home Health/Outpatient orders at discharge: home health care  [address]: In-House therapy     Durable Medical Equipment ordered at discharge: None  [address] received prior to discharge: N/A    LPN Care Coordinator managing patient: TTEO Franco. Community Care Team will sign off at this time. Medications were not reconciled and general patient assessment was not completed during this skilled nursing facility outreach.      JETT Adkins  [facility] Team

## 2022-07-19 ENCOUNTER — PATIENT OUTREACH (OUTPATIENT)
Dept: CASE MANAGEMENT | Age: 82
End: 2022-07-19

## 2022-07-19 NOTE — PROGRESS NOTES
Outgoing call placed to [facility] regarding update of patient progress. Spoke to [employer] (staff member) patient identified utilizing 2 identifiers. Introduced self and reason for the call, [employer] reports patient is currently admitted to the facility.   Will continue to monitor patient progress

## 2022-07-27 ENCOUNTER — PATIENT OUTREACH (OUTPATIENT)
Dept: CASE MANAGEMENT | Age: 82
End: 2022-07-27

## 2022-07-27 NOTE — PROGRESS NOTES
Outgoing call placed to [facility] spoke to Isamar (staff)  patient identified utilizing 2 identifiers, introduced self and reason for the call given. Ms. Overvold report patient returned to [facility] and care will be managed by in-house staff. Episode of care closed.

## 2022-07-27 NOTE — PROGRESS NOTES
[address] [place] [address] Dr Discharge Note    *The information contained in this note was received during a weekly care coordination call with the post acute facility*      Patient was discharged from [facility] ([facility]) on 7/26/2022 to [facility]. PCP: MD ZURI Scott appointment: No future appointments. Home Health/Outpatient orders at discharge:  [address] [place] [address] Rd ordered at discharge:  None  [facility]: N/A  Durable Medical Equipment [facility] to [facility] will sign off at this time. Medications were not reconciled and general patient assessment was not completed during this skilled nursing facility outreach.      Fadi Chand, MSLIZA  [facility] Team

## 2022-09-08 ENCOUNTER — APPOINTMENT (OUTPATIENT)
Dept: GENERAL RADIOLOGY | Age: 82
End: 2022-09-08
Attending: EMERGENCY MEDICINE
Payer: MEDICARE

## 2022-09-08 ENCOUNTER — HOSPITAL ENCOUNTER (EMERGENCY)
Age: 82
Discharge: HOME | End: 2022-09-08
Attending: EMERGENCY MEDICINE
Payer: MEDICARE

## 2022-09-08 ENCOUNTER — APPOINTMENT (OUTPATIENT)
Dept: CT IMAGING | Age: 82
End: 2022-09-08
Attending: EMERGENCY MEDICINE
Payer: MEDICARE

## 2022-09-08 VITALS
DIASTOLIC BLOOD PRESSURE: 69 MMHG | TEMPERATURE: 98.4 F | WEIGHT: 133.6 LBS | HEART RATE: 90 BPM | OXYGEN SATURATION: 95 % | SYSTOLIC BLOOD PRESSURE: 133 MMHG | RESPIRATION RATE: 16 BRPM | BODY MASS INDEX: 23.67 KG/M2

## 2022-09-08 DIAGNOSIS — M17.10 ARTHRITIS OF KNEE: Primary | ICD-10-CM

## 2022-09-08 DIAGNOSIS — S09.90XA CLOSED HEAD INJURY, INITIAL ENCOUNTER: ICD-10-CM
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
## 2022-09-08 PROCEDURE — 99284 EMERGENCY DEPT VISIT MOD MDM: CPT

## 2022-09-08 PROCEDURE — 70450 CT HEAD/BRAIN W/O DYE: CPT

## 2022-09-08 PROCEDURE — 74011250637 HC RX REV CODE- 250/637: Performed by: EMERGENCY MEDICINE

## 2022-09-08 PROCEDURE — 72125 CT NECK SPINE W/O DYE: CPT

## 2022-09-08 PROCEDURE — 73562 X-RAY EXAM OF KNEE 3: CPT

## 2022-09-08 RX ORDER — ACETAMINOPHEN 325 MG/1
650 TABLET ORAL
Qty: 20 TABLET | Refills: 0 | Status: SHIPPED | OUTPATIENT
Start: 2022-09-08

## 2022-09-08 RX ORDER — ACETAMINOPHEN 325 MG/1
650 TABLET ORAL ONCE
Status: COMPLETED | OUTPATIENT
Start: 2022-09-08 | End: 2022-09-08

## 2022-09-08 RX ADMIN — ACETAMINOPHEN 650 MG: 325 TABLET ORAL at 14:32

## 2022-09-08 NOTE — ED TRIAGE NOTES
Arrives from [address] [facility] assisted living with witnessed mechanical trip and fall resulting in L knee pain and R eye pain/bruising. No LOC.

## 2022-09-08 NOTE — ED NOTES
I have reviewed discharge instructions with [address] to Home transport personnel. [address] to Home transport personnelverbalized understanding. Copy of discharge instructions provided.

## 2022-09-08 NOTE — ED NOTES
Patient ambulatory to bathroom with assistance. Gait steady. Patient back to bed, and placed back on monitor.

## 2022-09-08 NOTE — ED PROVIDER NOTES
80-year-old female with past medical history significant for dementia, hypertension, osteoporosis, osteoarthritis presents with complaints of left knee pain and right periorbital bruising after trip and fall witnessed at assisted living facility. Patient denies head pain. Denies loss of consciousness. No loss of consciousness per nursing staff at assisted living facility. Denies numbness, tingling, weakness. Patient complains only of left knee pain. Denies any recent illness, fever, chills, nausea, vomiting, chest pain, shortness of breath. Patient reports that she uses a wheelchair most of the time.     Denies tobacco use, drug use, alcohol use  Pmd-cage       Past Medical History:   Diagnosis Date    Acute meniscal tear of knee     Alzheimer disease ([facility] [address]     Bulging disc     Dementia (HCC)     DJD (degenerative joint disease)     l knee    GERD (gastroesophageal reflux disease)     HTN     Hypothyroid     OA (osteoarthritis)     Osteoporosis     Spinal stenosis        Past Surgical History:   Procedure Laterality Date    COLONOSCOPY       HX HYSTERECTOMY  1979    HX ORTHOPAEDIC      left knee 2008    IR DILATE ESOPH STRICT      2004    NEUROLOGICAL PROCEDURE UNLISTED  2013    MO BREAST SURGERY PROCEDURE UNLISTED      1968 implants    STRESS TEST - GXT  2005    neg         Family History:   Problem Relation Age of Onset    Cancer Father     Heart Attack Brother     Stroke Brother     Coronary Art Dis Brother        Social History     Socioeconomic History    Marital status:      Spouse name: Not on file    Number of children: Not on file    Years of education: Not on file    Highest education level: Not on file   Occupational History    Not on file   Tobacco Use    Smoking status: Never    Smokeless tobacco: Never   Substance and Sexual Activity    Alcohol use: Not Currently     Comment: occas    Drug use: No    Sexual activity: Not on file   Other Topics Concern    Not on file   Social History Narrative    Not on file     Social Determinants of Health     Financial Resource Strain: Not on file   Food Insecurity: Not on file   Transportation Needs: Not on file   Physical Activity: Not on file   Stress: Not on file   Social Connections: Not on file   Intimate Partner Violence: Not on file   Housing Stability: Not on file         ALLERGIES: Ace inhibitors, Ambien [zolpidem], Keflex [cephalexin], and Levaquin [levofloxacin]    Review of Systems   Constitutional:  Negative for chills and fever. Eyes:  Negative for photophobia, pain and visual disturbance. Respiratory:  Negative for cough and shortness of breath. Cardiovascular:  Negative for chest pain. Gastrointestinal:  Negative for abdominal pain, nausea and vomiting. Genitourinary:  Negative for dysuria and urgency. Musculoskeletal:  Positive for arthralgias and joint swelling. Skin:  Negative for wound. Neurological:  Negative for seizures and headaches. Vitals:    09/08/22 1310   BP: 137/82   Pulse: 90   Resp: 18   Temp: 98.4 °F (36.9 °C)   SpO2: 98%   Weight: 60.6 kg (133 lb 9.6 oz)            Physical Exam  Vitals and nursing note reviewed. Constitutional:       Appearance: She is well-developed. HENT:      Head: Normocephalic and atraumatic. Mouth/Throat:      Mouth: Mucous membranes are moist.   Eyes:      Extraocular Movements: Extraocular movements intact. Pupils: Pupils are equal, round, and reactive to light. Comments: No periorbital bruising noted. No signs of trauma to head noted. Neck:      Trachea: No tracheal deviation. Cardiovascular:      Rate and Rhythm: Normal rate and regular rhythm. Heart sounds: Normal heart sounds. Pulmonary:      Effort: Pulmonary effort is normal. No respiratory distress. Breath sounds: Normal breath sounds. No stridor. No wheezing or rales. Chest:      Chest wall: No tenderness. Abdominal:      General: Bowel sounds are normal. There is no distension. Palpations: Abdomen is soft. Tenderness: There is no abdominal tenderness. There is no rebound. Musculoskeletal:         General: No tenderness. Cervical back: Normal range of motion and neck supple. Right knee: No swelling, erythema, ecchymosis or bony tenderness. Left knee: Swelling, effusion and ecchymosis present. No erythema or bony tenderness. Skin:     General: Skin is warm and dry. Coloration: Skin is not pale. Findings: No rash. Neurological:      Mental Status: She is alert and oriented to person, place, and time. Cranial Nerves: No cranial nerve deficit. MDM  Number of Diagnoses or Management Options  Arthritis of knee  Closed head injury, initial encounter  Fall, initial encounter  Diagnosis management comments: 50-year-old female presents from assisted living facility after witnessed trip and fall. History of osteoarthritis, osteoporosis and dementia. Patient is well-appearing, no acute distress, hemodynamically stable, afebrile, nontoxic, no signs of head trauma, no C-spine tenderness palpation step-offs or crepitus. Left knee with ecchymosis, decreased range of motion, mild medial tenderness to palpation, swelling. Plan-head CT, C-spine CT, knee x-ray, pain control. CT unremarkable  Knee x-ray with no fracture; osteoarthritis with effusion. Amount and/or Complexity of Data Reviewed  Tests in the radiology section of CPT®: ordered and reviewed           Procedures    Patient reevaluation. Patient reports pain improved. Discussed results with patient. Agreeable plan for discharge. Patient's results have been reviewed with them. Patient and/or family have verbally conveyed their understanding and agreement of the patient's signs, symptoms, diagnosis, treatment and prognosis and additionally agree to follow up as recommended or return to the Emergency Room should their condition change prior to follow-up.   Discharge instructions have also been provided to the patient with some educational information regarding their diagnosis as well a list of reasons why they would want to return to the ER prior to their follow-up appointment should their condition change.

## 2024-03-16 ENCOUNTER — HOSPITAL ENCOUNTER (INPATIENT)
Facility: HOSPITAL | Age: 84
LOS: 6 days | Discharge: OTHER INSTITUTION | End: 2024-03-22
Attending: STUDENT IN AN ORGANIZED HEALTH CARE EDUCATION/TRAINING PROGRAM | Admitting: INTERNAL MEDICINE
Payer: MEDICARE

## 2024-03-16 ENCOUNTER — APPOINTMENT (OUTPATIENT)
Facility: HOSPITAL | Age: 84
End: 2024-03-16
Payer: MEDICARE

## 2024-03-16 DIAGNOSIS — R11.2 NAUSEA AND VOMITING, UNSPECIFIED VOMITING TYPE: Primary | ICD-10-CM

## 2024-03-16 DIAGNOSIS — N39.0 URINARY TRACT INFECTION WITH HEMATURIA, SITE UNSPECIFIED: ICD-10-CM

## 2024-03-16 DIAGNOSIS — R31.9 URINARY TRACT INFECTION WITH HEMATURIA, SITE UNSPECIFIED: ICD-10-CM

## 2024-03-16 DIAGNOSIS — R93.5 ABNORMAL CT OF THE ABDOMEN: ICD-10-CM

## 2024-03-16 PROBLEM — R93.2 BILIARY TRACT IMAGING ABNORMALITY: Status: ACTIVE | Noted: 2024-03-16

## 2024-03-16 LAB
ALBUMIN SERPL-MCNC: 3.5 G/DL (ref 3.5–5)
ALBUMIN/GLOB SERPL: 0.7 (ref 1.1–2.2)
ALP SERPL-CCNC: 217 U/L (ref 45–117)
ALT SERPL-CCNC: 19 U/L (ref 12–78)
ANION GAP SERPL CALC-SCNC: 13 MMOL/L (ref 5–15)
ANION GAP SERPL CALC-SCNC: 15 MMOL/L (ref 5–15)
APPEARANCE UR: CLEAR
AST SERPL-CCNC: 47 U/L (ref 15–37)
BACTERIA URNS QL MICRO: ABNORMAL /HPF
BASOPHILS # BLD: 0 K/UL (ref 0–0.1)
BASOPHILS NFR BLD: 0 % (ref 0–1)
BILIRUB SERPL-MCNC: 1.2 MG/DL (ref 0.2–1)
BILIRUB UR QL: NEGATIVE
BUN SERPL-MCNC: 16 MG/DL (ref 6–20)
BUN SERPL-MCNC: 17 MG/DL (ref 6–20)
BUN/CREAT SERPL: 19 (ref 12–20)
BUN/CREAT SERPL: 23 (ref 12–20)
CALCIUM SERPL-MCNC: 8.6 MG/DL (ref 8.5–10.1)
CALCIUM SERPL-MCNC: 9.1 MG/DL (ref 8.5–10.1)
CHLORIDE SERPL-SCNC: 100 MMOL/L (ref 97–108)
CHLORIDE SERPL-SCNC: 103 MMOL/L (ref 97–108)
CO2 SERPL-SCNC: 23 MMOL/L (ref 21–32)
CO2 SERPL-SCNC: 26 MMOL/L (ref 21–32)
COLOR UR: ABNORMAL
COMMENT:: NORMAL
CREAT SERPL-MCNC: 0.73 MG/DL (ref 0.55–1.02)
CREAT SERPL-MCNC: 0.84 MG/DL (ref 0.55–1.02)
DIFFERENTIAL METHOD BLD: ABNORMAL
EKG ATRIAL RATE: 80 BPM
EKG DIAGNOSIS: NORMAL
EKG P AXIS: 56 DEGREES
EKG P-R INTERVAL: 162 MS
EKG Q-T INTERVAL: 372 MS
EKG QRS DURATION: 56 MS
EKG QTC CALCULATION (BAZETT): 429 MS
EKG R AXIS: 0 DEGREES
EKG T AXIS: 28 DEGREES
EKG VENTRICULAR RATE: 80 BPM
EOSINOPHIL # BLD: 0 K/UL (ref 0–0.4)
EOSINOPHIL NFR BLD: 0 % (ref 0–7)
EPITH CASTS URNS QL MICRO: ABNORMAL /LPF
ERYTHROCYTE [DISTWIDTH] IN BLOOD BY AUTOMATED COUNT: 14 % (ref 11.5–14.5)
FLUAV AG NPH QL IA: NEGATIVE
FLUBV AG NOSE QL IA: NEGATIVE
GLOBULIN SER CALC-MCNC: 4.8 G/DL (ref 2–4)
GLUCOSE SERPL-MCNC: 143 MG/DL (ref 65–100)
GLUCOSE SERPL-MCNC: 168 MG/DL (ref 65–100)
GLUCOSE UR STRIP.AUTO-MCNC: NEGATIVE MG/DL
HCT VFR BLD AUTO: 46.1 % (ref 35–47)
HGB BLD-MCNC: 15.4 G/DL (ref 11.5–16)
HGB UR QL STRIP: ABNORMAL
IMM GRANULOCYTES # BLD AUTO: 0 K/UL (ref 0–0.04)
IMM GRANULOCYTES NFR BLD AUTO: 0 % (ref 0–0.5)
KETONES UR QL STRIP.AUTO: 40 MG/DL
LEUKOCYTE ESTERASE UR QL STRIP.AUTO: NEGATIVE
LIPASE SERPL-CCNC: 20 U/L (ref 13–75)
LYMPHOCYTES # BLD: 0.3 K/UL (ref 0.8–3.5)
LYMPHOCYTES NFR BLD: 4 % (ref 12–49)
MAGNESIUM SERPL-MCNC: 1.9 MG/DL (ref 1.6–2.4)
MCH RBC QN AUTO: 27.3 PG (ref 26–34)
MCHC RBC AUTO-ENTMCNC: 33.4 G/DL (ref 30–36.5)
MCV RBC AUTO: 81.7 FL (ref 80–99)
MONOCYTES # BLD: 0.3 K/UL (ref 0–1)
MONOCYTES NFR BLD: 3 % (ref 5–13)
MUCOUS THREADS URNS QL MICRO: ABNORMAL /LPF
NEUTS SEG # BLD: 7.9 K/UL (ref 1.8–8)
NEUTS SEG NFR BLD: 93 % (ref 32–75)
NITRITE UR QL STRIP.AUTO: POSITIVE
NRBC # BLD: 0 K/UL (ref 0–0.01)
NRBC BLD-RTO: 0 PER 100 WBC
PH UR STRIP: 5.5 (ref 5–8)
PLATELET # BLD AUTO: 218 K/UL (ref 150–400)
PMV BLD AUTO: 11.3 FL (ref 8.9–12.9)
POTASSIUM SERPL-SCNC: 3.8 MMOL/L (ref 3.5–5.1)
POTASSIUM SERPL-SCNC: 5.6 MMOL/L (ref 3.5–5.1)
PROT SERPL-MCNC: 8.3 G/DL (ref 6.4–8.2)
PROT UR STRIP-MCNC: ABNORMAL MG/DL
RBC # BLD AUTO: 5.64 M/UL (ref 3.8–5.2)
RBC #/AREA URNS HPF: ABNORMAL /HPF (ref 0–5)
RBC MORPH BLD: ABNORMAL
SARS-COV-2 RDRP RESP QL NAA+PROBE: NOT DETECTED
SODIUM SERPL-SCNC: 138 MMOL/L (ref 136–145)
SODIUM SERPL-SCNC: 142 MMOL/L (ref 136–145)
SOURCE: NORMAL
SP GR UR REFRACTOMETRY: >1.03 (ref 1–1.03)
SPECIMEN HOLD: NORMAL
TROPONIN I SERPL HS-MCNC: 5 NG/L (ref 0–51)
URINE CULTURE IF INDICATED: ABNORMAL
UROBILINOGEN UR QL STRIP.AUTO: 0.2 EU/DL (ref 0.2–1)
WBC # BLD AUTO: 8.5 K/UL (ref 3.6–11)
WBC URNS QL MICRO: ABNORMAL /HPF (ref 0–4)

## 2024-03-16 PROCEDURE — 6360000004 HC RX CONTRAST MEDICATION: Performed by: STUDENT IN AN ORGANIZED HEALTH CARE EDUCATION/TRAINING PROGRAM

## 2024-03-16 PROCEDURE — 81001 URINALYSIS AUTO W/SCOPE: CPT

## 2024-03-16 PROCEDURE — 99285 EMERGENCY DEPT VISIT HI MDM: CPT

## 2024-03-16 PROCEDURE — 99222 1ST HOSP IP/OBS MODERATE 55: CPT | Performed by: INTERNAL MEDICINE

## 2024-03-16 PROCEDURE — 87086 URINE CULTURE/COLONY COUNT: CPT

## 2024-03-16 PROCEDURE — 93010 ELECTROCARDIOGRAM REPORT: CPT | Performed by: INTERNAL MEDICINE

## 2024-03-16 PROCEDURE — 1200000000 HC SEMI PRIVATE

## 2024-03-16 PROCEDURE — 87040 BLOOD CULTURE FOR BACTERIA: CPT

## 2024-03-16 PROCEDURE — 83735 ASSAY OF MAGNESIUM: CPT

## 2024-03-16 PROCEDURE — 74177 CT ABD & PELVIS W/CONTRAST: CPT

## 2024-03-16 PROCEDURE — 2580000003 HC RX 258: Performed by: STUDENT IN AN ORGANIZED HEALTH CARE EDUCATION/TRAINING PROGRAM

## 2024-03-16 PROCEDURE — 36415 COLL VENOUS BLD VENIPUNCTURE: CPT

## 2024-03-16 PROCEDURE — 96375 TX/PRO/DX INJ NEW DRUG ADDON: CPT

## 2024-03-16 PROCEDURE — 87186 SC STD MICRODIL/AGAR DIL: CPT

## 2024-03-16 PROCEDURE — 93005 ELECTROCARDIOGRAM TRACING: CPT | Performed by: STUDENT IN AN ORGANIZED HEALTH CARE EDUCATION/TRAINING PROGRAM

## 2024-03-16 PROCEDURE — 96365 THER/PROPH/DIAG IV INF INIT: CPT

## 2024-03-16 PROCEDURE — 83690 ASSAY OF LIPASE: CPT

## 2024-03-16 PROCEDURE — 6360000002 HC RX W HCPCS: Performed by: FAMILY MEDICINE

## 2024-03-16 PROCEDURE — 87804 INFLUENZA ASSAY W/OPTIC: CPT

## 2024-03-16 PROCEDURE — 85025 COMPLETE CBC W/AUTO DIFF WBC: CPT

## 2024-03-16 PROCEDURE — 6360000002 HC RX W HCPCS: Performed by: STUDENT IN AN ORGANIZED HEALTH CARE EDUCATION/TRAINING PROGRAM

## 2024-03-16 PROCEDURE — 80053 COMPREHEN METABOLIC PANEL: CPT

## 2024-03-16 PROCEDURE — 87088 URINE BACTERIA CULTURE: CPT

## 2024-03-16 PROCEDURE — 84484 ASSAY OF TROPONIN QUANT: CPT

## 2024-03-16 PROCEDURE — 2580000003 HC RX 258: Performed by: INTERNAL MEDICINE

## 2024-03-16 PROCEDURE — 2580000003 HC RX 258: Performed by: FAMILY MEDICINE

## 2024-03-16 PROCEDURE — 87635 SARS-COV-2 COVID-19 AMP PRB: CPT

## 2024-03-16 PROCEDURE — 6370000000 HC RX 637 (ALT 250 FOR IP): Performed by: FAMILY MEDICINE

## 2024-03-16 PROCEDURE — 96374 THER/PROPH/DIAG INJ IV PUSH: CPT

## 2024-03-16 RX ORDER — ACETAMINOPHEN 325 MG/1
650 TABLET ORAL EVERY 6 HOURS PRN
Status: DISCONTINUED | OUTPATIENT
Start: 2024-03-16 | End: 2024-03-22 | Stop reason: HOSPADM

## 2024-03-16 RX ORDER — RISPERIDONE 1 MG/1
1 TABLET ORAL DAILY
Status: DISCONTINUED | OUTPATIENT
Start: 2024-03-17 | End: 2024-03-22 | Stop reason: HOSPADM

## 2024-03-16 RX ORDER — SODIUM CHLORIDE 9 MG/ML
INJECTION, SOLUTION INTRAVENOUS CONTINUOUS
Status: DISCONTINUED | OUTPATIENT
Start: 2024-03-16 | End: 2024-03-22 | Stop reason: HOSPADM

## 2024-03-16 RX ORDER — DONEPEZIL HYDROCHLORIDE 10 MG/1
10 TABLET, FILM COATED ORAL DAILY
Status: DISCONTINUED | OUTPATIENT
Start: 2024-03-17 | End: 2024-03-22 | Stop reason: HOSPADM

## 2024-03-16 RX ORDER — ONDANSETRON 2 MG/ML
4 INJECTION INTRAMUSCULAR; INTRAVENOUS EVERY 6 HOURS PRN
Status: DISCONTINUED | OUTPATIENT
Start: 2024-03-16 | End: 2024-03-22 | Stop reason: HOSPADM

## 2024-03-16 RX ORDER — QUETIAPINE FUMARATE 25 MG/1
25 TABLET, FILM COATED ORAL NIGHTLY
Status: DISCONTINUED | OUTPATIENT
Start: 2024-03-16 | End: 2024-03-22 | Stop reason: HOSPADM

## 2024-03-16 RX ORDER — ONDANSETRON 2 MG/ML
4 INJECTION INTRAMUSCULAR; INTRAVENOUS ONCE
Status: COMPLETED | OUTPATIENT
Start: 2024-03-16 | End: 2024-03-16

## 2024-03-16 RX ORDER — HYDROMORPHONE HYDROCHLORIDE 1 MG/ML
0.25 INJECTION, SOLUTION INTRAMUSCULAR; INTRAVENOUS; SUBCUTANEOUS EVERY 4 HOURS PRN
Status: DISCONTINUED | OUTPATIENT
Start: 2024-03-16 | End: 2024-03-22 | Stop reason: HOSPADM

## 2024-03-16 RX ORDER — ACETAMINOPHEN 650 MG/1
650 SUPPOSITORY RECTAL EVERY 6 HOURS PRN
Status: DISCONTINUED | OUTPATIENT
Start: 2024-03-16 | End: 2024-03-22 | Stop reason: HOSPADM

## 2024-03-16 RX ORDER — SODIUM CHLORIDE 0.9 % (FLUSH) 0.9 %
5-40 SYRINGE (ML) INJECTION PRN
Status: DISCONTINUED | OUTPATIENT
Start: 2024-03-16 | End: 2024-03-22 | Stop reason: HOSPADM

## 2024-03-16 RX ORDER — NALOXONE HYDROCHLORIDE 0.4 MG/ML
0.4 INJECTION, SOLUTION INTRAMUSCULAR; INTRAVENOUS; SUBCUTANEOUS PRN
Status: DISCONTINUED | OUTPATIENT
Start: 2024-03-16 | End: 2024-03-22 | Stop reason: HOSPADM

## 2024-03-16 RX ORDER — MIRTAZAPINE 15 MG/1
TABLET, FILM COATED ORAL
COMMUNITY
Start: 2024-02-13

## 2024-03-16 RX ORDER — MIDAZOLAM HYDROCHLORIDE 2 MG/2ML
0.5 INJECTION, SOLUTION INTRAMUSCULAR; INTRAVENOUS ONCE
Status: COMPLETED | OUTPATIENT
Start: 2024-03-16 | End: 2024-03-16

## 2024-03-16 RX ORDER — ONDANSETRON 4 MG/1
4 TABLET, ORALLY DISINTEGRATING ORAL EVERY 8 HOURS PRN
Status: DISCONTINUED | OUTPATIENT
Start: 2024-03-16 | End: 2024-03-22 | Stop reason: HOSPADM

## 2024-03-16 RX ORDER — LEVOTHYROXINE SODIUM 175 UG/1
175 TABLET ORAL DAILY
COMMUNITY

## 2024-03-16 RX ORDER — SODIUM CHLORIDE 0.9 % (FLUSH) 0.9 %
5-40 SYRINGE (ML) INJECTION EVERY 12 HOURS SCHEDULED
Status: DISCONTINUED | OUTPATIENT
Start: 2024-03-16 | End: 2024-03-22 | Stop reason: HOSPADM

## 2024-03-16 RX ORDER — 0.9 % SODIUM CHLORIDE 0.9 %
500 INTRAVENOUS SOLUTION INTRAVENOUS ONCE
Status: COMPLETED | OUTPATIENT
Start: 2024-03-16 | End: 2024-03-16

## 2024-03-16 RX ORDER — METOPROLOL SUCCINATE 25 MG/1
25 TABLET, EXTENDED RELEASE ORAL DAILY
Status: DISCONTINUED | OUTPATIENT
Start: 2024-03-17 | End: 2024-03-22 | Stop reason: HOSPADM

## 2024-03-16 RX ORDER — POLYETHYLENE GLYCOL 3350 17 G/17G
17 POWDER, FOR SOLUTION ORAL DAILY PRN
Status: DISCONTINUED | OUTPATIENT
Start: 2024-03-16 | End: 2024-03-22 | Stop reason: HOSPADM

## 2024-03-16 RX ORDER — AMLODIPINE BESYLATE 5 MG/1
5 TABLET ORAL DAILY
Status: DISCONTINUED | OUTPATIENT
Start: 2024-03-17 | End: 2024-03-22 | Stop reason: HOSPADM

## 2024-03-16 RX ORDER — SODIUM CHLORIDE 9 MG/ML
INJECTION, SOLUTION INTRAVENOUS PRN
Status: DISCONTINUED | OUTPATIENT
Start: 2024-03-16 | End: 2024-03-22 | Stop reason: HOSPADM

## 2024-03-16 RX ORDER — LORAZEPAM 0.5 MG/1
0.5 TABLET ORAL ONCE
Status: DISCONTINUED | OUTPATIENT
Start: 2024-03-16 | End: 2024-03-16

## 2024-03-16 RX ORDER — MIRTAZAPINE 15 MG/1
15 TABLET, FILM COATED ORAL NIGHTLY
Status: DISCONTINUED | OUTPATIENT
Start: 2024-03-16 | End: 2024-03-22 | Stop reason: HOSPADM

## 2024-03-16 RX ADMIN — SODIUM CHLORIDE 500 ML: 9 INJECTION, SOLUTION INTRAVENOUS at 12:00

## 2024-03-16 RX ADMIN — SODIUM CHLORIDE: 9 INJECTION, SOLUTION INTRAVENOUS at 22:14

## 2024-03-16 RX ADMIN — MIRTAZAPINE 15 MG: 15 TABLET, FILM COATED ORAL at 22:20

## 2024-03-16 RX ADMIN — VANCOMYCIN HYDROCHLORIDE 1000 MG: 1 INJECTION, POWDER, LYOPHILIZED, FOR SOLUTION INTRAVENOUS at 14:17

## 2024-03-16 RX ADMIN — IOPAMIDOL 100 ML: 755 INJECTION, SOLUTION INTRAVENOUS at 13:22

## 2024-03-16 RX ADMIN — PIPERACILLIN SODIUM AND TAZOBACTAM SODIUM 4500 MG: 4; .5 INJECTION, POWDER, LYOPHILIZED, FOR SOLUTION INTRAVENOUS at 22:17

## 2024-03-16 RX ADMIN — ONDANSETRON 4 MG: 2 INJECTION INTRAMUSCULAR; INTRAVENOUS at 12:01

## 2024-03-16 RX ADMIN — MIDAZOLAM 0.5 MG: 1 INJECTION INTRAMUSCULAR; INTRAVENOUS at 14:10

## 2024-03-16 ASSESSMENT — PAIN - FUNCTIONAL ASSESSMENT: PAIN_FUNCTIONAL_ASSESSMENT: NONE - DENIES PAIN

## 2024-03-16 ASSESSMENT — PAIN SCALES - GENERAL: PAINLEVEL_OUTOF10: 0

## 2024-03-16 ASSESSMENT — LIFESTYLE VARIABLES
HOW MANY STANDARD DRINKS CONTAINING ALCOHOL DO YOU HAVE ON A TYPICAL DAY: PATIENT DOES NOT DRINK
HOW OFTEN DO YOU HAVE A DRINK CONTAINING ALCOHOL: NEVER

## 2024-03-16 NOTE — ED PROVIDER NOTES
3:14 PM  Change of shift. Care of patient taken over from Dr. Cobb; H&P reviewed, bedside handoff complete.  Awaiting imaging and admission    CT reviewed.  Concerning for cholangitis versus reactive from previous stent.  Here bilirubin is minimally elevated.  AST minimally elevated and alk phos is elevated.  On exam, patient does have some generalized tenderness and appears somewhat jaundice.  She provides no history.  Dr. Cobb wanted this patient admitted and had already started the patient on antibiotics for UTI.  Will continue with admission planning and reach out to GI.  GI consult is pending    Perfect Serve Consult for Admission  3:49 PM    ED Room Number: SER06/06  Patient Name and age:  Genesis Mello 83 y.o.  female  Working Diagnosis:   1. Nausea and vomiting, unspecified vomiting type    2. Urinary tract infection with hematuria, site unspecified    3. Abnormal CT of the abdomen        COVID-19 Suspicion: No  Sepsis present:  No  Reassessment needed: No  Code Status:  Do Not Resuscitate on file  Readmission: No  Isolation Requirements: no  Recommended Level of Care: telemetry  Department: [place] [facility] - [phone]  Consulting Provider: GI    Other:  CT reviewed.  Concerning for cholangitis versus reactive from previous stent.  Here bilirubin is minimally elevated.  AST minimally elevated and alk phos is elevated.  On exam, patient does have some generalized tenderness and appears somewhat jaundice.  She provides no history.  Dr. Cobb wanted this patient admitted and had already started the patient on antibiotics for UTI.  Will continue with admission planning and reach out to GI.  GI consult is pending                  Enio Franks,   03/16/24 9694

## 2024-03-16 NOTE — ED PROVIDER NOTES
Glucose 143 (*)     All other components within normal limits   COVID-19, RAPID   RAPID INFLUENZA A/B ANTIGENS   MAGNESIUM   LIPASE   TROPONIN       All other labs were within normal range or not returned as of this dictation.    EMERGENCY DEPARTMENT COURSE and DIFFERENTIAL DIAGNOSIS/MDM:   Vitals:    Vitals:    03/16/24 1141 03/16/24 1200   BP: 130/74 114/82   Pulse: 99 82   Resp: 11 24   Temp: 98.5 °F (36.9 °C)    TempSrc: Oral    SpO2: 98% 96%   Weight: 66.8 kg (147 lb 4.3 oz)            Medical Decision Making  Differential includes but not limited to small bowel obstruction, ileus, volvulus, ACS, dehydration, electrolyte disturbance, COVID, flu.  Patient from nursing home.  History of dementia does not know why she is here.  Patient reports that \"you guys are trying to kill me \"    Amount and/or Complexity of Data Reviewed  Labs: ordered.  Radiology: ordered.  ECG/medicine tests: ordered.    Risk  Prescription drug management.            REASSESSMENT     ED Course as of 03/16/24 1459   Sat Mar 16, 2024   1211 ECG performed at 1210 shows normal sinus rhythm, ventricular rate 80, normal intervals no STEMI [WG]   1400 Pt. requiring  [WG]   1403 Pt agitated, requiring sedation for CT. Giving small amount of versed [WG]   1404 Patient was given vancomycin for UTI, with allergies to cephalosporins and fluoroquinolones [WG]   1458 Change of shift. Care of patient taken over by Dr. Franks; H&P reviewed, handoff complete. [WG]      ED Course User Index  [WG] Maikol Cobb DO           CONSULTS:  None    PROCEDURES:  Unless otherwise noted below, none     Procedures      FINAL IMPRESSION      1. Nausea and vomiting, unspecified vomiting type    2. Urinary tract infection with hematuria, site unspecified          DISPOSITION/PLAN   DISPOSITION        PATIENT REFERRED TO:  No follow-up provider specified.    DISCHARGE MEDICATIONS:  New Prescriptions    No medications on file         (Please note that portions of  this note were completed with a voice recognition program.  Efforts were made to edit the dictations but occasionally words are mis-transcribed.)    Maikol Cobb DO (electronically signed)  Emergency Attending Physician / Physician Assistant / Nurse Practitioner              Maikol Cobb DO  03/16/24 6816

## 2024-03-16 NOTE — ED NOTES
Patient straight cathed, draining 50 ml of dawood colored urine. Patient changed into a clean brief, repositioned in bed, and provided with pillow and blanket.

## 2024-03-16 NOTE — ED NOTES
TRANSFER - OUT REPORT:    Verbal report given to JENNIFER Barrett on Genesis Mello  being transferred to  for routine progression of patient care       Report consisted of patient's Situation, Background, Assessment and   Recommendations(SBAR).     Information from the following report(s) ED SBAR, MAR, Recent Results, and Cardiac Rhythm NSR  was reviewed with the receiving nurse.    Hooper Fall Assessment:    Presents to emergency department  because of falls (Syncope, seizure, or loss of consciousness): No  Age > 70: Yes  Altered Mental Status, Intoxication with alcohol or substance confusion (Disorientation, impaired judgment, poor safety awaremess, or inability to follow instructions): Yes  Impaired Mobility: Ambulates or transfers with assistive devices or assistance; Unable to ambulate or transer.: No  Nursing Judgement: Yes          Lines:   Peripheral IV 03/16/24 Left;Dorsal Hand (Active)       Peripheral IV 03/16/24 Right Antecubital (Active)        Opportunity for questions and clarification was provided.      Patient transported with:  Monitor

## 2024-03-16 NOTE — ED NOTES
CT called. CT reports patient would not lie still for imaging. Will notify primary RN and attending physician.

## 2024-03-16 NOTE — ED TRIAGE NOTES
Patient arrives via ems from [facility]. States that the patient has had nausea and vomiting since this morning. Patient is AO x 1 at baseline.

## 2024-03-17 LAB
ALBUMIN SERPL-MCNC: 3 G/DL (ref 3.5–5)
ALBUMIN/GLOB SERPL: 0.8 (ref 1.1–2.2)
ALP SERPL-CCNC: 165 U/L (ref 45–117)
ALT SERPL-CCNC: 14 U/L (ref 12–78)
AMMONIA PLAS-SCNC: 31 UMOL/L
ANION GAP SERPL CALC-SCNC: 5 MMOL/L (ref 5–15)
APTT PPP: 27.3 SEC (ref 22.1–31)
AST SERPL-CCNC: 17 U/L (ref 15–37)
BASOPHILS # BLD: 0 K/UL (ref 0–0.1)
BASOPHILS NFR BLD: 1 % (ref 0–1)
BILIRUB DIRECT SERPL-MCNC: 0.3 MG/DL (ref 0–0.2)
BILIRUB SERPL-MCNC: 1 MG/DL (ref 0.2–1)
BUN SERPL-MCNC: 14 MG/DL (ref 6–20)
BUN/CREAT SERPL: 17 (ref 12–20)
CALCIUM SERPL-MCNC: 9 MG/DL (ref 8.5–10.1)
CHLORIDE SERPL-SCNC: 112 MMOL/L (ref 97–108)
CO2 SERPL-SCNC: 24 MMOL/L (ref 21–32)
CREAT SERPL-MCNC: 0.83 MG/DL (ref 0.55–1.02)
DIFFERENTIAL METHOD BLD: NORMAL
EOSINOPHIL # BLD: 0 K/UL (ref 0–0.4)
EOSINOPHIL NFR BLD: 0 % (ref 0–7)
ERYTHROCYTE [DISTWIDTH] IN BLOOD BY AUTOMATED COUNT: 13.7 % (ref 11.5–14.5)
GLOBULIN SER CALC-MCNC: 3.7 G/DL (ref 2–4)
GLUCOSE SERPL-MCNC: 94 MG/DL (ref 65–100)
HCT VFR BLD AUTO: 39.8 % (ref 35–47)
HGB BLD-MCNC: 12.9 G/DL (ref 11.5–16)
IMM GRANULOCYTES # BLD AUTO: 0 K/UL (ref 0–0.04)
IMM GRANULOCYTES NFR BLD AUTO: 0 % (ref 0–0.5)
INR PPP: 1.1 (ref 0.9–1.1)
LACTATE SERPL-SCNC: 1 MMOL/L (ref 0.4–2)
LYMPHOCYTES # BLD: 1 K/UL (ref 0.8–3.5)
LYMPHOCYTES NFR BLD: 16 % (ref 12–49)
MAGNESIUM SERPL-MCNC: 1.9 MG/DL (ref 1.6–2.4)
MCH RBC QN AUTO: 27.1 PG (ref 26–34)
MCHC RBC AUTO-ENTMCNC: 32.4 G/DL (ref 30–36.5)
MCV RBC AUTO: 83.6 FL (ref 80–99)
MONOCYTES # BLD: 0.5 K/UL (ref 0–1)
MONOCYTES NFR BLD: 8 % (ref 5–13)
NEUTS SEG # BLD: 4.5 K/UL (ref 1.8–8)
NEUTS SEG NFR BLD: 75 % (ref 32–75)
NRBC # BLD: 0 K/UL (ref 0–0.01)
NRBC BLD-RTO: 0 PER 100 WBC
PHOSPHATE SERPL-MCNC: 3.1 MG/DL (ref 2.6–4.7)
PLATELET # BLD AUTO: 200 K/UL (ref 150–400)
PMV BLD AUTO: 10.6 FL (ref 8.9–12.9)
POTASSIUM SERPL-SCNC: 3.5 MMOL/L (ref 3.5–5.1)
PROT SERPL-MCNC: 6.7 G/DL (ref 6.4–8.2)
PROTHROMBIN TIME: 11.7 SEC (ref 9–11.1)
RBC # BLD AUTO: 4.76 M/UL (ref 3.8–5.2)
SODIUM SERPL-SCNC: 141 MMOL/L (ref 136–145)
THERAPEUTIC RANGE: NORMAL SECS (ref 58–77)
WBC # BLD AUTO: 6.1 K/UL (ref 3.6–11)

## 2024-03-17 PROCEDURE — 1100000000 HC RM PRIVATE

## 2024-03-17 PROCEDURE — 6370000000 HC RX 637 (ALT 250 FOR IP): Performed by: FAMILY MEDICINE

## 2024-03-17 PROCEDURE — 82248 BILIRUBIN DIRECT: CPT

## 2024-03-17 PROCEDURE — 36415 COLL VENOUS BLD VENIPUNCTURE: CPT

## 2024-03-17 PROCEDURE — 83605 ASSAY OF LACTIC ACID: CPT

## 2024-03-17 PROCEDURE — 6360000002 HC RX W HCPCS: Performed by: FAMILY MEDICINE

## 2024-03-17 PROCEDURE — 85025 COMPLETE CBC W/AUTO DIFF WBC: CPT

## 2024-03-17 PROCEDURE — 85730 THROMBOPLASTIN TIME PARTIAL: CPT

## 2024-03-17 PROCEDURE — 80053 COMPREHEN METABOLIC PANEL: CPT

## 2024-03-17 PROCEDURE — 84100 ASSAY OF PHOSPHORUS: CPT

## 2024-03-17 PROCEDURE — 2580000003 HC RX 258: Performed by: FAMILY MEDICINE

## 2024-03-17 PROCEDURE — 82140 ASSAY OF AMMONIA: CPT

## 2024-03-17 PROCEDURE — 83735 ASSAY OF MAGNESIUM: CPT

## 2024-03-17 PROCEDURE — 85610 PROTHROMBIN TIME: CPT

## 2024-03-17 PROCEDURE — 2580000003 HC RX 258: Performed by: INTERNAL MEDICINE

## 2024-03-17 RX ADMIN — MIRTAZAPINE 15 MG: 15 TABLET, FILM COATED ORAL at 21:40

## 2024-03-17 RX ADMIN — PIPERACILLIN AND TAZOBACTAM 3375 MG: 3; .375 INJECTION, POWDER, FOR SOLUTION INTRAVENOUS at 10:04

## 2024-03-17 RX ADMIN — SERTRALINE HYDROCHLORIDE 25 MG: 50 TABLET ORAL at 10:02

## 2024-03-17 RX ADMIN — METOPROLOL SUCCINATE 25 MG: 25 TABLET, EXTENDED RELEASE ORAL at 10:02

## 2024-03-17 RX ADMIN — PIPERACILLIN AND TAZOBACTAM 3375 MG: 3; .375 INJECTION, POWDER, FOR SOLUTION INTRAVENOUS at 02:56

## 2024-03-17 RX ADMIN — LEVOTHYROXINE SODIUM 175 MCG: 0.15 TABLET ORAL at 06:16

## 2024-03-17 RX ADMIN — AMLODIPINE BESYLATE 5 MG: 5 TABLET ORAL at 10:02

## 2024-03-17 RX ADMIN — PIPERACILLIN AND TAZOBACTAM 3375 MG: 3; .375 INJECTION, POWDER, FOR SOLUTION INTRAVENOUS at 18:39

## 2024-03-17 RX ADMIN — DONEPEZIL HYDROCHLORIDE 10 MG: 10 TABLET, FILM COATED ORAL at 10:02

## 2024-03-17 RX ADMIN — RISPERIDONE 1 MG: 1 TABLET, FILM COATED ORAL at 10:02

## 2024-03-17 RX ADMIN — QUETIAPINE FUMARATE 25 MG: 25 TABLET ORAL at 21:40

## 2024-03-17 RX ADMIN — SODIUM CHLORIDE: 9 INJECTION, SOLUTION INTRAVENOUS at 12:37

## 2024-03-17 NOTE — CONSULTS
[facility] [address]  [place] [address]        Gastroenterology Consult     Referring Physician:  Patricia Lew M.D.    Consult Date: 3/16/2024     Subjective:     Chief Complaint: nausea, vomiting, poor appetite    History of Present Illness: Genesis Mello is a 83 y.o. female who is seen in consultation for nausea, vomiting, poor appetite and possible cholangitis.  Pt has dementia and is unable to provide any meaningful history.  History obtained from the chart.  Patient was transferred from [facility] for possible cholangitis, UTI.  CT Abdomen showed enhancement and wall thickening of the extrahepatic bile duct with stent in the distal duct.  No stones were seen.  Pt has normal AST/ALT/TB/WBC ct and no fevers.   Her ALP is 217.    Past Medical History:   Diagnosis Date    Acute meniscal tear of knee     Alzheimer disease (HCC)     Bulging disc     Dementia (HCC)     Hypothyroid     Osteoporosis     Spinal stenosis      Past Surgical History:   Procedure Laterality Date    BREAST SURGERY      1968 implants    COLONOSCOPY       HYSTERECTOMY (CERVIX STATUS UNKNOWN)  1979    IR ESOPHAGEAL DILITATION      2004    NEUROLOGICAL SURGERY  2013    ORTHOPEDIC SURGERY      left knee 2008    STRESS TEST - GXT  2005    neg      Family History   Problem Relation Age of Onset    Coronary Art Dis Brother     Stroke Brother     Heart Attack Brother     Cancer Father      Social History     Tobacco Use    Smoking status: Never    Smokeless tobacco: Never   Substance Use Topics    Alcohol use: Not Currently      Allergies   Allergen Reactions    Ace Inhibitors Hives and Swelling    Cephalexin Other (See Comments)     Pt reports having hives from head to toe so her PCP included several drugs that she was taking at the time as the cause (Keflex, Levaquin and ACE inhibitors)    Levofloxacin      Other reaction(s): Not Reported This Time    Zolpidem Other (See Comments)      % 93 (H) 32 - 75 %    Lymphocytes % 4 (L) 12 - 49 %    Monocytes % 3 (L) 5 - 13 %    Eosinophils % 0 0 - 7 %    Basophils % 0 0 - 1 %    Immature Granulocytes 0 0.0 - 0.5 %    Neutrophils Absolute 7.9 1.8 - 8.0 K/UL    Lymphocytes Absolute 0.3 (L) 0.8 - 3.5 K/UL    Monocytes Absolute 0.3 0.0 - 1.0 K/UL    Eosinophils Absolute 0.0 0.0 - 0.4 K/UL    Basophils Absolute 0.0 0.0 - 0.1 K/UL    Absolute Immature Granulocyte 0.0 0.00 - 0.04 K/UL    Differential Type SMEAR SCANNED      RBC Comment NORMOCYTIC, NORMOCHROMIC     CMP    Collection Time: 03/16/24 11:43 AM   Result Value Ref Range    Sodium 138 136 - 145 mmol/L    Potassium 5.6 (H) 3.5 - 5.1 mmol/L    Chloride 100 97 - 108 mmol/L    CO2 23 21 - 32 mmol/L    Anion Gap 15 5 - 15 mmol/L    Glucose 168 (H) 65 - 100 mg/dL    BUN 17 6 - 20 MG/DL    Creatinine 0.73 0.55 - 1.02 MG/DL    Bun/Cre Ratio 23 (H) 12 - 20      Est, Glom Filt Rate >60 >60 ml/min/1.73m2    Calcium 9.1 8.5 - 10.1 MG/DL    Total Bilirubin 1.2 (H) 0.2 - 1.0 MG/DL    ALT 19 12 - 78 U/L    AST 47 (H) 15 - 37 U/L    Alk Phosphatase 217 (H) 45 - 117 U/L    Total Protein 8.3 (H) 6.4 - 8.2 g/dL    Albumin 3.5 3.5 - 5.0 g/dL    Globulin 4.8 (H) 2.0 - 4.0 g/dL    Albumin/Globulin Ratio 0.7 (L) 1.1 - 2.2     Magnesium    Collection Time: 03/16/24 11:43 AM   Result Value Ref Range    Magnesium 1.9 1.6 - 2.4 mg/dL   Lipase    Collection Time: 03/16/24 11:43 AM   Result Value Ref Range    Lipase 20 13 - 75 U/L   Troponin    Collection Time: 03/16/24 11:43 AM   Result Value Ref Range    Troponin, High Sensitivity 5 0 - 51 ng/L   COVID-19, Rapid    Collection Time: 03/16/24 12:03 PM    Specimen: Nasopharyngeal   Result Value Ref Range    Source Nasopharyngeal      SARS-CoV-2, Rapid Not detected NOTD     Rapid influenza A/B antigens    Collection Time: 03/16/24 12:03 PM    Specimen: Nasopharyngeal   Result Value Ref Range    Influenza A Ag Negative NEG      Influenza B Ag Negative NEG     EKG 12 Lead    Collection

## 2024-03-17 NOTE — H&P
History and Physical    Date of Service:  3/16/2024  Primary Care Provider: Teri Ríos MD  Source of information: Patient is confused and agitated not answering questions.  History is obtained per review ED and electronic medical records.    Chief Complaint: Patient does not provide      History of Presenting Illness:   Genesis Mello is a 83 y.o. female past medical history of Alzheimer's dementia, hypothyroidism, osteoporosis, osteoarthritis, GERD, left knee DJD, lumbar DDD, bronchitis, asthma, right shoulder replacement, spinal stenosis, acute meniscal tear of knee, right breast implant presented to the emergency department via EMS from [facility] with reported nausea and vomiting, decreased oral intake.  Has no reports of abdominal pain, melena, hematochezia, hematemesis, dysuria, hematuria.  At baseline, patient is A&O x 1 limited historian with history of dementia.  On arrival to ED, initial corded vital signs temperature 98.5 °F, /74, heart rate 99, respirate 11, O2 saturation 90% room air.  12 EKG showed normal sinus rhythm, ST changes in the septal leads 80 beats a minute.  Urinalysis showed 40 ketones, > 1.030 specific gravity, moderate blood, 5-10 RBCs, 0-4 RBCs, trace protein, positive nitrite, 0-4 WBCs, negative leukocyte esterase.  Abnormal labs included blood glucose 168, platelets 93, total bili 1.2, AST 47, alkaline phosphatase 217.  Initial potassium =5.6 was hemolyzed; repeat potassium 3.8.  CT abdomen pelvis with IV contrast showed extrahepatic biliary mucosal enhancement suggesting possible cholangitis versus reactive to CBD stent with other incidental findings of small hiatal hernia, ruptured chronic right breast implant, atelectasis at lung bases, T12 compression fraction, sigmoid and transverse diverticulosis, multiple jejunal angioectasias, and right renal cyst.  ED ordered Zofran 4 mg IV x 1 dose, 0.9% normal saline 500 mL bolus x 1, Versed 0.5 mg IV x 1, and  above  -Repeat total/direct bilirubin level and LFTs in a.m.    5.  Thrombocytopenia  -Platelets 93  -Avoid antiplatelets/anticoagulants  -Repeat platelet count in a.m.    6.  Hypothyroidism  -Resume home dose of levothyroxine.  Check TSH and free T4 level.    7.  Essential hypertension  -Resume home dose of metoprolol XL 25 mg p.o. daily    8.  Alzheimer's dementia  -Resume home dose of Aricept 10 mg daily    9.  Agitation and anxiety  -Resume home dose of Seroquel, risperidone  -Continue Zoloft          DIET: Diet NPO Exceptions are: Sips of Water with Meds, Ice Chips, Sips of Clear Liquids   ISOLATION PRECAUTIONS: No active isolations  CODE STATUS: Full Code   Central Line:   none  DVT PROPHYLAXIS: SCDs  FUNCTIONAL STATUS PRIOR TO HOSPITALIZATION: Uncertain of patient's baseline ambulatory status   EARLY MOBILITY ASSESSMENT: Recommend routine ambulation while hospitalized with the assistance of nursing staff  ANTICIPATED DISCHARGE: Greater than 48 hours.  ANTICIPATED DISPOSITION: SNF  EMERGENCY CONTACT/SURROGATE DECISION MAKER:   Taina Barajas, bassem  ([id number])     CRITICAL CARE WAS PERFORMED FOR THIS ENCOUNTER: NO.    Nurse notes that patient has DNR CODE STATUS but has it is melanite and family not available at this time to obtain additional confirmation advance directive, patient remains full code until final verification/confirmation.  Signed By: Seymour Medley MD     March 16, 2024         Please note that this dictation may have been completed with Dragon, the [employer] voice recognition software.  Quite often unanticipated grammatical, syntax, homophones, and other interpretive errors are inadvertently transcribed by the computer software.  Please disregard these errors.  Please excuse any errors that have escaped final proofreading.

## 2024-03-17 NOTE — PROGRESS NOTES
GI Progress Note    I spoke to daughter who informed me that patient had biliary stent placed within the past year for bile duct blockage but she was unsure if her mother had stones or jaundice at the time.  She was not aware that the stent had to be removed. I explained that if the stent is in place for more than 3-4 months it can be a nidus for infection and should be removed.     She requested that the stent be removed while she is inpatient.    Will plan on stent removal in the next 1-2 days.    Kenn Dill M.D.

## 2024-03-17 NOTE — PROGRESS NOTES
Anil called for report and patient transferred to [facility],handed over and settled in bed left sleeping.

## 2024-03-17 NOTE — PROGRESS NOTES
Hospitalist Progress Note  Shannon Durham, ARIADNE - CNP  Answering service:         Date of Service:  3/17/2024  NAME:  Genesis Mello  :  1940  MRN:  [id number]      Admission Summary:   Genesis Mello is a 83 y.o. female past medical history of Alzheimer's dementia, hypothyroidism, osteoporosis, osteoarthritis, GERD, left knee DJD, lumbar DDD, bronchitis, asthma, right shoulder replacement, spinal stenosis, acute meniscal tear of knee, right breast implant presented to the emergency department via EMS from [facility] with reported nausea and vomiting, decreased oral intake.  Has no reports of abdominal pain, melena, hematochezia, hematemesis, dysuria, hematuria.  At baseline, patient is A&O x 1 limited historian with history of dementia.  On arrival to ED, initial corded vital signs temperature 98.5 °F, /74, heart rate 99, respirate 11, O2 saturation 90% room air.  12 EKG showed normal sinus rhythm, ST changes in the septal leads 80 beats a minute.  Urinalysis showed 40 ketones, > 1.030 specific gravity, moderate blood, 5-10 RBCs, 0-4 RBCs, trace protein, positive nitrite, 0-4 WBCs, negative leukocyte esterase.  Abnormal labs included blood glucose 168, platelets 93, total bili 1.2, AST 47, alkaline phosphatase 217.  Initial potassium =5.6 was hemolyzed; repeat potassium 3.8.  CT abdomen pelvis with IV contrast showed extrahepatic biliary mucosal enhancement suggesting possible cholangitis versus reactive to CBD stent with other incidental findings of small hiatal hernia, ruptured chronic right breast implant, atelectasis at lung bases, T12 compression fraction, sigmoid and transverse diverticulosis, multiple jejunal angioectasias, and right renal cyst.  ED ordered Zofran 4 mg IV x 1 dose, 0.9% normal saline 500 mL bolus x 1, Versed 0.5 mg IV x 1, and vancomycin 1000 mg IV x 1 dose.     Pertinent items are noted in HPI.         Vital Signs:    Last 24hrs VS reviewed since prior progress note. Most recent are:  Vitals:    03/17/24 1000   BP:    Pulse: 75   Resp:    Temp:    SpO2:          Intake/Output Summary (Last 24 hours) at 3/17/2024 1053  Last data filed at 3/17/2024 0622  Gross per 24 hour   Intake 1273.86 ml   Output --   Net 1273.86 ml        Physical Examination:     I had a face to face encounter with this patient and independently examined them on 3/17/2024 as outlined below:          General : alert x 1 (demented at baseline), awake, conversational  HEENT: EOMI, normocephalic, atraumatic, moist mucous membranes   Neck: supple, nontender, no JVD, no masses or swelling   Respiratory: clear to auscultation, no distress, normal resp rate  Cardiovascular: regular rate and rhythm, no murmur appreciated, normal heart sounds   Abd: non-tender, soft, no distention, bowel sounds active x 4 quadrants  Genitourinary: no walls  Extremities: moves all, normal capillary refill,no noted edema   Neuro: alert, oriented x 1 (dementetd at baseline), normal speech, no obvious motor deficits   Skin: warm, dry, normal color, no rash  Psych:calm, cooperative        Data Review:    Review and/or order of clinical lab test    I have independently reviewed and interpreted patient's lab and all other diagnostic data    Notes reviewed from all clinical/nonclinical/nursing services involved in patient's clinical care. Care coordination discussions were held with appropriate clinical/nonclinical/ nursing providers based on care coordination needs.     Labs:     Recent Labs     03/16/24  1143 03/17/24  0236   WBC 8.5 6.1   HGB 15.4 12.9   HCT 46.1 39.8    200     Recent Labs     03/16/24  1143 03/16/24  1338 03/17/24  0236    142 141   K 5.6* 3.8 3.5    103 112*   CO2 23 26 24   BUN 17 16 14   MG 1.9  --  1.9   PHOS  --   --  3.1     Recent Labs     03/16/24  1143 03/17/24  0236   ALT 19 14   GLOB

## 2024-03-18 PROCEDURE — 6360000002 HC RX W HCPCS: Performed by: FAMILY MEDICINE

## 2024-03-18 PROCEDURE — 1100000000 HC RM PRIVATE

## 2024-03-18 PROCEDURE — 6370000000 HC RX 637 (ALT 250 FOR IP): Performed by: FAMILY MEDICINE

## 2024-03-18 PROCEDURE — 2580000003 HC RX 258: Performed by: FAMILY MEDICINE

## 2024-03-18 PROCEDURE — 2580000003 HC RX 258: Performed by: INTERNAL MEDICINE

## 2024-03-18 RX ADMIN — LEVOTHYROXINE SODIUM 175 MCG: 0.15 TABLET ORAL at 06:50

## 2024-03-18 RX ADMIN — MIRTAZAPINE 15 MG: 15 TABLET, FILM COATED ORAL at 21:22

## 2024-03-18 RX ADMIN — METOPROLOL SUCCINATE 25 MG: 25 TABLET, EXTENDED RELEASE ORAL at 09:15

## 2024-03-18 RX ADMIN — PIPERACILLIN AND TAZOBACTAM 3375 MG: 3; .375 INJECTION, POWDER, FOR SOLUTION INTRAVENOUS at 02:18

## 2024-03-18 RX ADMIN — QUETIAPINE FUMARATE 25 MG: 25 TABLET ORAL at 21:22

## 2024-03-18 RX ADMIN — RISPERIDONE 1 MG: 1 TABLET, FILM COATED ORAL at 09:15

## 2024-03-18 RX ADMIN — PIPERACILLIN AND TAZOBACTAM 3375 MG: 3; .375 INJECTION, POWDER, FOR SOLUTION INTRAVENOUS at 10:01

## 2024-03-18 RX ADMIN — SODIUM CHLORIDE: 9 INJECTION, SOLUTION INTRAVENOUS at 06:48

## 2024-03-18 RX ADMIN — AMLODIPINE BESYLATE 5 MG: 5 TABLET ORAL at 09:14

## 2024-03-18 RX ADMIN — PIPERACILLIN AND TAZOBACTAM 3375 MG: 3; .375 INJECTION, POWDER, FOR SOLUTION INTRAVENOUS at 18:03

## 2024-03-18 RX ADMIN — SERTRALINE HYDROCHLORIDE 25 MG: 50 TABLET ORAL at 09:14

## 2024-03-18 RX ADMIN — DONEPEZIL HYDROCHLORIDE 10 MG: 10 TABLET, FILM COATED ORAL at 09:15

## 2024-03-18 ASSESSMENT — PAIN SCALES - GENERAL: PAINLEVEL_OUTOF10: 0

## 2024-03-18 NOTE — PROGRESS NOTES
Hospitalist Progress Note  Shannon Durham, ARIADNE - CNP  Answering service:         Date of Service:  3/18/2024  NAME:  Genesis Mello  :  1940  MRN:  [id number]      Admission Summary:   Genesis Mello is a 83 y.o. female past medical history of Alzheimer's dementia, hypothyroidism, osteoporosis, osteoarthritis, GERD, left knee DJD, lumbar DDD, bronchitis, asthma, right shoulder replacement, spinal stenosis, acute meniscal tear of knee, right breast implant presented to the emergency department via EMS from [facility] with reported nausea and vomiting, decreased oral intake.  Has no reports of abdominal pain, melena, hematochezia, hematemesis, dysuria, hematuria.  At baseline, patient is A&O x 1 limited historian with history of dementia.  On arrival to ED, initial corded vital signs temperature 98.5 °F, /74, heart rate 99, respirate 11, O2 saturation 90% room air.  12 EKG showed normal sinus rhythm, ST changes in the septal leads 80 beats a minute.  Urinalysis showed 40 ketones, > 1.030 specific gravity, moderate blood, 5-10 RBCs, 0-4 RBCs, trace protein, positive nitrite, 0-4 WBCs, negative leukocyte esterase.  Abnormal labs included blood glucose 168, platelets 93, total bili 1.2, AST 47, alkaline phosphatase 217.  Initial potassium =5.6 was hemolyzed; repeat potassium 3.8.  CT abdomen pelvis with IV contrast showed extrahepatic biliary mucosal enhancement suggesting possible cholangitis versus reactive to CBD stent with other incidental findings of small hiatal hernia, ruptured chronic right breast implant, atelectasis at lung bases, T12 compression fraction, sigmoid and transverse diverticulosis, multiple jejunal angioectasias, and right renal cyst.  ED ordered Zofran 4 mg IV x 1 dose, 0.9% normal saline 500 mL bolus x 1, Versed 0.5 mg IV x 1, and vancomycin 1000 mg IV x 1 dose.   (SEROQUEL) tablet 25 mg  25 mg Oral Nightly    risperiDONE (RISPERDAL) tablet 1 mg  1 mg Oral Daily    sertraline (ZOLOFT) tablet 25 mg  25 mg Oral Daily    levothyroxine (SYNTHROID) tablet 175 mcg  175 mcg Oral QAM AC     ______________________________________________________________________  EXPECTED LENGTH OF STAY: 5  ACTUAL LENGTH OF STAY:          2                 Shannon Durham, APRN - CNP

## 2024-03-18 NOTE — PROGRESS NOTES
extrahepatic biliary mucosa  (601-64) is likely reactive in the setting of a stent in the distal CBD.  Cholangitis can also have this appearance, however. Correlation with LFTs  recommended. Sludge or noncalcified stones layer dependently in the nondistended  gallbladder.     Right renal cyst requires no follow-up. The stomach, duodenum, liver, pancreas,  spleen, adrenals, and kidneys are otherwise normal     Pelvis: Sigmoid diverticulosis is moderate and transverse diverticulosis is  mild. Incidental note is made of multiple jejunal angioectasias. The ileum,  ileocecal junction, appendix, and bladder are normal. There is excreted contrast  in the bladder. Post hysterectomy. No free air or fluid, and no abdominopelvic  lymphadenopathy.     IMPRESSION:  Extrahepatic biliary mucosal enhancement: cholangitis versus reactive to CBD  stent. Correlation with LFTs recommended.       Assessment:     Nausea/vomiting: Presented with nausea, vomiting, and decreased appetite. Patient with significant dementia, unable to provide history. CT abd/pelvis (3/16) showing extrahepatic biliary mucosal enhancement: cholangitis versus reactive CBD stent. Correlation with LFTs recommended. Unclear why she had a biliary stent placed, daughter reports bile duct blockage but unsure if due to stones or jaundice. Stent placed November 2023 possibly at [facility]. Will attempt to get records. Alk phos 165. AST/ALT normal. Tbili previous elevation 1.2, now wnl. Direct bilirubin 0.3. WBC normal. Tentative plans for ERCP Wednesday (3/20).   Alzhemier's dementia   Hypothyroidism  HTN     Patient Active Problem List   Diagnosis    Bronchospasm with bronchitis, acute    Advanced care planning/counseling discussion    OA (osteoarthritis)    Tachycardia    Bronchitis    HTN (hypertension)    History of right shoulder replacement    Dementia without behavioral disturbance (HCC)    Hypothyroid    Osteoporosis    Asthma    Left knee DJD     Advance care planning    DDD (degenerative disc disease), lumbar    GERD (gastroesophageal reflux disease)    Hypokalemia    Biliary tract imaging abnormality     Plan:     Diet as tolerated, CLD at midnight   Supportive measures  Trend LFTs  Tentative plans for ERCP on 3/20. Risks of procedure reviewed including anesthesia, bleeding, infection, perforation, and post procedure pancreatitis. Patient's daughter understands and agrees to proceed.   Patient was discussed with Dr. Arais      Signed By: Emy Moore PA-C     3/18/2024  9:45 AM

## 2024-03-19 LAB
ALBUMIN SERPL-MCNC: 2.9 G/DL (ref 3.5–5)
ALBUMIN/GLOB SERPL: 0.8 (ref 1.1–2.2)
ALP SERPL-CCNC: 151 U/L (ref 45–117)
ALT SERPL-CCNC: 21 U/L (ref 12–78)
ANION GAP SERPL CALC-SCNC: 8 MMOL/L (ref 5–15)
AST SERPL-CCNC: 23 U/L (ref 15–37)
BASOPHILS # BLD: 0 K/UL (ref 0–0.1)
BASOPHILS NFR BLD: 0 % (ref 0–1)
BILIRUB DIRECT SERPL-MCNC: 0.1 MG/DL (ref 0–0.2)
BILIRUB SERPL-MCNC: 0.6 MG/DL (ref 0.2–1)
BUN SERPL-MCNC: 4 MG/DL (ref 6–20)
BUN/CREAT SERPL: 6 (ref 12–20)
CALCIUM SERPL-MCNC: 8.7 MG/DL (ref 8.5–10.1)
CHLORIDE SERPL-SCNC: 114 MMOL/L (ref 97–108)
CO2 SERPL-SCNC: 20 MMOL/L (ref 21–32)
CREAT SERPL-MCNC: 0.7 MG/DL (ref 0.55–1.02)
DIFFERENTIAL METHOD BLD: NORMAL
EOSINOPHIL # BLD: 0.2 K/UL (ref 0–0.4)
EOSINOPHIL NFR BLD: 4 % (ref 0–7)
ERYTHROCYTE [DISTWIDTH] IN BLOOD BY AUTOMATED COUNT: 13.3 % (ref 11.5–14.5)
GLOBULIN SER CALC-MCNC: 3.8 G/DL (ref 2–4)
GLUCOSE SERPL-MCNC: 85 MG/DL (ref 65–100)
HCT VFR BLD AUTO: 41.2 % (ref 35–47)
HGB BLD-MCNC: 14.1 G/DL (ref 11.5–16)
IMM GRANULOCYTES # BLD AUTO: 0 K/UL (ref 0–0.04)
IMM GRANULOCYTES NFR BLD AUTO: 0 % (ref 0–0.5)
LYMPHOCYTES # BLD: 2 K/UL (ref 0.8–3.5)
LYMPHOCYTES NFR BLD: 37 % (ref 12–49)
MCH RBC QN AUTO: 27.6 PG (ref 26–34)
MCHC RBC AUTO-ENTMCNC: 34.2 G/DL (ref 30–36.5)
MCV RBC AUTO: 80.8 FL (ref 80–99)
MONOCYTES # BLD: 0.5 K/UL (ref 0–1)
MONOCYTES NFR BLD: 10 % (ref 5–13)
NEUTS SEG # BLD: 2.7 K/UL (ref 1.8–8)
NEUTS SEG NFR BLD: 49 % (ref 32–75)
NRBC # BLD: 0 K/UL (ref 0–0.01)
NRBC BLD-RTO: 0 PER 100 WBC
PLATELET # BLD AUTO: 214 K/UL (ref 150–400)
PMV BLD AUTO: 10.3 FL (ref 8.9–12.9)
POTASSIUM SERPL-SCNC: 3.2 MMOL/L (ref 3.5–5.1)
PROT SERPL-MCNC: 6.7 G/DL (ref 6.4–8.2)
RBC # BLD AUTO: 5.1 M/UL (ref 3.8–5.2)
SODIUM SERPL-SCNC: 142 MMOL/L (ref 136–145)
WBC # BLD AUTO: 5.5 K/UL (ref 3.6–11)

## 2024-03-19 PROCEDURE — 6360000002 HC RX W HCPCS: Performed by: FAMILY MEDICINE

## 2024-03-19 PROCEDURE — 80076 HEPATIC FUNCTION PANEL: CPT

## 2024-03-19 PROCEDURE — 80048 BASIC METABOLIC PNL TOTAL CA: CPT

## 2024-03-19 PROCEDURE — 6370000000 HC RX 637 (ALT 250 FOR IP): Performed by: NURSE PRACTITIONER

## 2024-03-19 PROCEDURE — 2580000003 HC RX 258: Performed by: FAMILY MEDICINE

## 2024-03-19 PROCEDURE — 1100000000 HC RM PRIVATE

## 2024-03-19 PROCEDURE — 6370000000 HC RX 637 (ALT 250 FOR IP): Performed by: FAMILY MEDICINE

## 2024-03-19 PROCEDURE — 36415 COLL VENOUS BLD VENIPUNCTURE: CPT

## 2024-03-19 PROCEDURE — 85025 COMPLETE CBC W/AUTO DIFF WBC: CPT

## 2024-03-19 PROCEDURE — 99221 1ST HOSP IP/OBS SF/LOW 40: CPT | Performed by: NURSE PRACTITIONER

## 2024-03-19 RX ORDER — POTASSIUM CHLORIDE 750 MG/1
40 TABLET, FILM COATED, EXTENDED RELEASE ORAL ONCE
Status: COMPLETED | OUTPATIENT
Start: 2024-03-19 | End: 2024-03-19

## 2024-03-19 RX ADMIN — POTASSIUM CHLORIDE 40 MEQ: 750 TABLET, EXTENDED RELEASE ORAL at 10:09

## 2024-03-19 RX ADMIN — METOPROLOL SUCCINATE 25 MG: 25 TABLET, EXTENDED RELEASE ORAL at 10:06

## 2024-03-19 RX ADMIN — SODIUM CHLORIDE, PRESERVATIVE FREE 10 ML: 5 INJECTION INTRAVENOUS at 21:36

## 2024-03-19 RX ADMIN — AMLODIPINE BESYLATE 5 MG: 5 TABLET ORAL at 10:09

## 2024-03-19 RX ADMIN — RISPERIDONE 1 MG: 1 TABLET, FILM COATED ORAL at 10:06

## 2024-03-19 RX ADMIN — PIPERACILLIN AND TAZOBACTAM 3375 MG: 3; .375 INJECTION, POWDER, FOR SOLUTION INTRAVENOUS at 10:10

## 2024-03-19 RX ADMIN — QUETIAPINE FUMARATE 25 MG: 25 TABLET ORAL at 21:36

## 2024-03-19 RX ADMIN — PIPERACILLIN AND TAZOBACTAM 3375 MG: 3; .375 INJECTION, POWDER, FOR SOLUTION INTRAVENOUS at 18:07

## 2024-03-19 RX ADMIN — SERTRALINE HYDROCHLORIDE 25 MG: 50 TABLET ORAL at 10:08

## 2024-03-19 RX ADMIN — SODIUM CHLORIDE, PRESERVATIVE FREE 10 ML: 5 INJECTION INTRAVENOUS at 10:09

## 2024-03-19 RX ADMIN — PIPERACILLIN AND TAZOBACTAM 3375 MG: 3; .375 INJECTION, POWDER, FOR SOLUTION INTRAVENOUS at 02:36

## 2024-03-19 RX ADMIN — DONEPEZIL HYDROCHLORIDE 10 MG: 10 TABLET, FILM COATED ORAL at 10:09

## 2024-03-19 RX ADMIN — MIRTAZAPINE 15 MG: 15 TABLET, FILM COATED ORAL at 21:36

## 2024-03-19 ASSESSMENT — PAIN SCALES - GENERAL: PAINLEVEL_OUTOF10: 0

## 2024-03-19 NOTE — CONSULTS
[facility]  Inpatient Consultation        Admit Date: 3/16/2024  Reason for Consultation: choledocholithiasis    HPI:  Genesis Mello is a 83 y.o. female w/ hx as below including dementia whom we are asked to see in consultation by LUCINDA Pereira for the above complaint.  Pt presented to the ED from a SNF to the ED w/ c/o n/v and abd pain.  Also possible cholangitis.  She has hx of placement of biliary stent, not sure where or why.  Labs show an elevated alk phos and nl wbc.   She is demented so hx obtained from chart.  She currently denies abd pain or nausea.      CT-     Extrahepatic biliary mucosal enhancement: cholangitis versus reactive to CBD  stent. Correlation with LFTs recommended.      Patient Active Problem List    Diagnosis Date Noted    Biliary tract imaging abnormality 03/16/2024    Hypokalemia 06/24/2022    Dementia without behavioral disturbance (HCC) 08/13/2018    History of right shoulder replacement 10/31/2017    Advanced care planning/counseling discussion 03/27/2017    Advance care planning 03/15/2016    Asthma 12/17/2013    Bronchospasm with bronchitis, acute 09/16/2013    Bronchitis 09/16/2013    DDD (degenerative disc disease), lumbar 11/01/2011    Tachycardia 08/12/2011    OA (osteoarthritis) 05/10/2010    HTN (hypertension) 05/10/2010    Hypothyroid 05/10/2010    Osteoporosis 05/10/2010    Left knee DJD 05/10/2010    GERD (gastroesophageal reflux disease) 05/10/2010     Past Medical History:   Diagnosis Date    Acute meniscal tear of knee     Alzheimer disease (HCC)     Bulging disc     Dementia (HCC)     Hypothyroid     Osteoporosis     Spinal stenosis       Past Surgical History:   Procedure Laterality Date    BREAST SURGERY      1968 implants    COLONOSCOPY       HYSTERECTOMY (CERVIX STATUS UNKNOWN)  1979    IR ESOPHAGEAL DILITATION      2004    NEUROLOGICAL SURGERY  2013    ORTHOPEDIC SURGERY      left knee 2008    STRESS TEST - GXT  2005     3.1  --    ALT 19  --  14 21   INR  --   --  1.1  --      No results for input(s): \"AML\" in the last 72 hours.    Invalid input(s): \"LPSE\"    No intake or output data in the 24 hours ending 03/19/24 1140    _____________________  Physical Exam:     General:  Alert, cooperative, no distress, appears stated age.   Eyes:   Sclera clear.   Throat: Lips, mucosa, and tongue normal.   Neck: Supple, symmetrical, trachea midline.   Lungs:   Clear to auscultation bilaterally.   Heart:  Regular rate and rhythm.   Abdomen:   Normal BS, flat, Soft, TTP RUQ, No masses,  No organomegaly.   Extremities: Extremities normal, atraumatic, no cyanosis or edema.   Skin: Skin color, texture, turgor normal. No rashes or lesions.             Assessment:   Principal Problem:    Biliary tract imaging abnormality  Resolved Problems:    * No resolved hospital problems. *          Plan:     Pt for ERCP tomorrow  Pain mgmt  Will discuss need for lap sami after ERCP  Cont abx for UTI  Monitor labs    Thank you for allowing us to participate in the care of this patient.     Total time spent with patient: 40 minutes.    Signed By: ARIADNE Abrams - NP     March 19, 2024      Pt seen and examined   Agree with above  Pt says she has some pain though on exam is asymptomatic.   Will see how she does with the ERCP-  Lap Sami would be preventative   Need to consider medical condition and goals of care prior  Consider Palliative consult

## 2024-03-19 NOTE — CARE COORDINATION
Care Management Initial Assessment       RUR:  14%  Readmission? No  1st IM letter given? yes   3/19/24   1st  letter given: No  Patient has Medicare and [place] [employer] [place] secondary    Admission  Abmormal CT of the abdomen, biliary tract imaging abnormality, UTI with hematuria  Hx alzheimer's dementia, hypothyroidism osteoporosis, osteoarthritis, EMILIE, left knee DJD, asthma, spinal stenosis,    GI following    Surgery following  ERCP 3/20/24    Disposition   Return to [facility] Memory [facility] Unit-- [facility]   [phone]/ fax [phone] vs SNF   ([facility]) pending medical and therapy progress and recommendations  Contact  -- Afsaneh Charge nurse of memory care- [phone]) - she will come to the hospital to evaluate patient to determine if patient can return to memory care-  patient must be able to stand and pivot to return  (No authorization is needed if Snf-- Cm will send referral if needed. )    Transportation   BLS    Medical follow up   PCP and specialist    Contact  Daughter  Taina Barajas  [phone] CHAT[facility]  Granddaughter  Lilly Morgan [phone]  2nd [facility]    CM met with patient in her room-- she was alert and pleasant-- only oriented to self.  Cm attempted to talk with CHATO daughterTaina, but no answer and no HIPAA message.      CM talked with charge nurse Afsaneh at [facility] memory care unit and she confirmed that patient is a resident of the facility and that she receives assistance with adl's and iadl's.  The staff assist her as needed.   Patient is mobile mainly in the wheelchair   Prior to admission, she was able to stand and pivot to get to the wheelchair.  Patient does need to be able to sand and pivot to be allowed to return to the [facility].    Afsaneh said she will come to [facility] to access patient prior to her discharge.      If patient is not able to return to [facility], CM will send referral to [facility] as the facility has a working agreement with [facility] for SNF    Patient's daughter will be  of Residence Other (Comment)  ([facility] vs SNF penidng medical and therpay progress--)   Current Services Prior To Admission None   DME Ordered? No   Potential Assistance Purchasing Medications No   Services At/After Discharge   Transition of Care Consult (CM Consult) Assisted Living   Services At/After Discharge   (pending medical and therpay progress and recommendations  [facility] vs SNF)   Mode of Transport at Discharge BLS   Condition of Participation: Discharge Planning   The Plan for Transition of Care is related to the following treatment goals: return to [facility] vs SNF   if SNF  [facility] choice   The Patient and/or Patient Representative was provided with a Choice of Provider? Patient Representative   Name of the Patient Representative who was provided with the Choice of Provider and agrees with the Discharge Plan?  [facility] will be contacted  Johnny Barajas if SNf is the plan   The Patient and/Or Patient Representative agree with the Discharge Plan? Yes   Freedom of Choice list was provided with basic dialogue that supports the patient's individualized plan of care/goals, treatment preferences, and shares the quality data associated with the providers?  Yes     GIANA LUCAS

## 2024-03-19 NOTE — PROGRESS NOTES
[facility]   [address] [address]       GI PROGRESS NOTE  Will INNA Macedo  [phone] office      NAME: Genesis Mello   :  1940   MRN:  [id number]       Subjective:   Denies nausea, vomiting, or abdominal pain.     Objective:     VITALS:   Last 24hrs VS reviewed since prior progress note. Most recent are:  Vitals:    24 0829   BP: (!) 146/66   Pulse: 61   Resp: 16   Temp: 97.7 °F (36.5 °C)   SpO2: 96%     PHYSICAL EXAM:  General: Cooperative, no acute distress    Neurologic:  Alert  HEENT: EOMI, no scleral icterus   Lungs:  No acute respiratory distress  Heart:  S1 S2  Abdomen: Soft, non-distended, no tenderness, no guarding, no rebound. +Bowel sounds.   Extremities: Warm  Psych:   Not anxious or agitated    Lab Data Reviewed:     Recent Results (from the past 24 hour(s))   CBC with Auto Differential    Collection Time: 24  4:35 AM   Result Value Ref Range    WBC 5.5 3.6 - 11.0 K/uL    RBC 5.10 3.80 - 5.20 M/uL    Hemoglobin 14.1 11.5 - 16.0 g/dL    Hematocrit 41.2 35.0 - 47.0 %    MCV 80.8 80.0 - 99.0 FL    MCH 27.6 26.0 - 34.0 PG    MCHC 34.2 30.0 - 36.5 g/dL    RDW 13.3 11.5 - 14.5 %    Platelets 214 150 - 400 K/uL    MPV 10.3 8.9 - 12.9 FL    Nucleated RBCs 0.0 0  WBC    nRBC 0.00 0.00 - 0.01 K/uL    Neutrophils % 49 32 - 75 %    Lymphocytes % 37 12 - 49 %    Monocytes % 10 5 - 13 %    Eosinophils % 4 0 - 7 %    Basophils % 0 0 - 1 %    Immature Granulocytes 0 0.0 - 0.5 %    Neutrophils Absolute 2.7 1.8 - 8.0 K/UL    Lymphocytes Absolute 2.0 0.8 - 3.5 K/UL    Monocytes Absolute 0.5 0.0 - 1.0 K/UL    Eosinophils Absolute 0.2 0.0 - 0.4 K/UL    Basophils Absolute 0.0 0.0 - 0.1 K/UL    Absolute Immature Granulocyte 0.0 0.00 - 0.04 K/UL    Differential Type AUTOMATED     Basic Metabolic Panel    Collection Time: 24  4:35 AM   Result Value Ref Range    Sodium 142 136 - 145 mmol/L    Potassium 3.2 (L) 3.5 - 5.1 mmol/L    Chloride 114 (H) 97 -  108 mmol/L    CO2 20 (L) 21 - 32 mmol/L    Anion Gap 8 5 - 15 mmol/L    Glucose 85 65 - 100 mg/dL    BUN 4 (L) 6 - 20 MG/DL    Creatinine 0.70 0.55 - 1.02 MG/DL    Bun/Cre Ratio 6 (L) 12 - 20      Est, Glom Filt Rate >60 >60 ml/min/1.73m2    Calcium 8.7 8.5 - 10.1 MG/DL   Hepatic Function Panel    Collection Time: 03/19/24  4:35 AM   Result Value Ref Range    Total Protein 6.7 6.4 - 8.2 g/dL    Albumin 2.9 (L) 3.5 - 5.0 g/dL    Globulin 3.8 2.0 - 4.0 g/dL    Albumin/Globulin Ratio 0.8 (L) 1.1 - 2.2      Total Bilirubin 0.6 0.2 - 1.0 MG/DL    Bilirubin, Direct 0.1 0.0 - 0.2 MG/DL    Alk Phosphatase 151 (H) 45 - 117 U/L    AST 23 15 - 37 U/L    ALT 21 12 - 78 U/L       Assessment:     Nausea/vomiting and decreased appetite: Per chart review, CT abdomen/pelvis with IV contrast (3/16/24): extrahepatic biliary mucosal enhancement: cholangitis versus reactive to CBD stent, correlation with LFTs recommended. ERCP 11/16/22 for [facility]ed[facility]olith[facility] at [facility]: small stones were removed and 7 Fr by 7 cm plastic biliary stent was placed. WBC 5.5, AST 23, ALT 21, alkaline phosphatase 151, total bilirubin 0.6.   Dementia  Hypothyroidism  Hypertension     Patient Active Problem List   Diagnosis    Bronchospasm with bronchitis, acute    Advanced care planning/counseling discussion    OA (osteoarthritis)    Tachycardia    Bronchitis    HTN (hypertension)    History of right shoulder replacement    Dementia without behavioral disturbance (HCC)    Hypothyroid    Osteoporosis    Asthma    Left knee DJD    Advance care planning    DDD (degenerative disc disease), lumbar    GERD (gastroesophageal reflux disease)    Hypokalemia    Biliary tract imaging abnormality     Plan:     NPO after midnight  Supportive measures  Trend LFTs  Plan for ERCP tomorrow with Dr. Arias which was discussed with the patient's daughter as documented yesterday. Consult surgery for consideration of cholecystectomy if within goals of care.      Signed By: Maikol

## 2024-03-19 NOTE — PROGRESS NOTES
Hospitalist Progress Note  ARIADNE Vinson NP  Answering service: [phone] OR [phone] from in house phone        Date of Service:  3/19/2024  NAME:  Genesis Mello  :  1940  MRN:  [id number]      Admission Summary:          Genesis Mello is a 83 y.o. female past medical history of Alzheimer's dementia, hypothyroidism, osteoporosis, osteoarthritis, GERD, left knee DJD, lumbar DDD, bronchitis, asthma, right shoulder replacement, spinal stenosis, acute meniscal tear of knee, right breast implant presented to the emergency department via EMS from [facility] with reported nausea and vomiting, decreased oral intake.  Has no reports of abdominal pain, melena, hematochezia, hematemesis, dysuria, hematuria.  At baseline, patient is A&O x 1 limited historian with history of dementia.  On arrival to ED, initial corded vital signs temperature 98.5 °F, /74, heart rate 99, respirate 11, O2 saturation 90% room air.  12 EKG showed normal sinus rhythm, ST changes in the septal leads 80 beats a minute.  Urinalysis showed 40 ketones, > 1.030 specific gravity, moderate blood, 5-10 RBCs, 0-4 RBCs, trace protein, positive nitrite, 0-4 WBCs, negative leukocyte esterase.  Abnormal labs included blood glucose 168, platelets 93, total bili 1.2, AST 47, alkaline phosphatase 217.  Initial potassium =5.6 was hemolyzed; repeat potassium 3.8.  CT abdomen pelvis with IV contrast showed extrahepatic biliary mucosal enhancement suggesting possible cholangitis versus reactive to CBD stent with other incidental findings of small hiatal hernia, ruptured chronic right breast implant, atelectasis at lung bases, T12 compression fraction, sigmoid and transverse diverticulosis, multiple jejunal angioectasias, and right renal cyst.  ED ordered Zofran 4 mg IV x 1 dose, 0.9% normal saline 500 mL bolus x 1, Versed 0.5 mg IV x 1, and  Continuous    HYDROmorphone HCl PF (DILAUDID) injection 0.25 mg  0.25 mg IntraVENous Q4H PRN    naloxone (NARCAN) injection 0.4 mg  0.4 mg IntraVENous PRN    sodium chloride flush 0.9 % injection 5-40 mL  5-40 mL IntraVENous 2 times per day    sodium chloride flush 0.9 % injection 5-40 mL  5-40 mL IntraVENous PRN    0.9 % sodium chloride infusion   IntraVENous PRN    ondansetron (ZOFRAN-ODT) disintegrating tablet 4 mg  4 mg Oral Q8H PRN    Or    ondansetron (ZOFRAN) injection 4 mg  4 mg IntraVENous Q6H PRN    polyethylene glycol (GLYCOLAX) packet 17 g  17 g Oral Daily PRN    acetaminophen (TYLENOL) tablet 650 mg  650 mg Oral Q6H PRN    Or    acetaminophen (TYLENOL) suppository 650 mg  650 mg Rectal Q6H PRN    piperacillin-tazobactam (ZOSYN) 3,375 mg in sodium chloride 0.9 % 50 mL IVPB (mini-bag)  3,375 mg IntraVENous Q8H    amLODIPine (NORVASC) tablet 5 mg  5 mg Oral Daily    donepezil (ARICEPT) tablet 10 mg  10 mg Oral Daily    metoprolol succinate (TOPROL XL) extended release tablet 25 mg  25 mg Oral Daily    mirtazapine (REMERON) tablet 15 mg  15 mg Oral Nightly    QUEtiapine (SEROQUEL) tablet 25 mg  25 mg Oral Nightly    risperiDONE (RISPERDAL) tablet 1 mg  1 mg Oral Daily    sertraline (ZOLOFT) tablet 25 mg  25 mg Oral Daily    levothyroxine (SYNTHROID) tablet 175 mcg  175 mcg Oral QAM AC     ______________________________________________________________________  EXPECTED LENGTH OF STAY: Unable to retrieve estimated LOS  ACTUAL LENGTH OF STAY:          3                 Malachi Franco, APRN - NP

## 2024-03-20 ENCOUNTER — ANESTHESIA (OUTPATIENT)
Facility: HOSPITAL | Age: 84
End: 2024-03-20
Payer: MEDICARE

## 2024-03-20 ENCOUNTER — ANESTHESIA EVENT (OUTPATIENT)
Facility: HOSPITAL | Age: 84
End: 2024-03-20
Payer: MEDICARE

## 2024-03-20 LAB
ALBUMIN SERPL-MCNC: 3.2 G/DL (ref 3.5–5)
ALBUMIN/GLOB SERPL: 0.9 (ref 1.1–2.2)
ALP SERPL-CCNC: 153 U/L (ref 45–117)
ALT SERPL-CCNC: 20 U/L (ref 12–78)
ANION GAP SERPL CALC-SCNC: 12 MMOL/L (ref 5–15)
AST SERPL-CCNC: 36 U/L (ref 15–37)
BACTERIA SPEC CULT: ABNORMAL
BACTERIA SPEC CULT: ABNORMAL
BASOPHILS # BLD: 0 K/UL (ref 0–0.1)
BASOPHILS NFR BLD: 0 % (ref 0–1)
BILIRUB SERPL-MCNC: 0.8 MG/DL (ref 0.2–1)
BUN SERPL-MCNC: 3 MG/DL (ref 6–20)
BUN/CREAT SERPL: 5 (ref 12–20)
CALCIUM SERPL-MCNC: 8.6 MG/DL (ref 8.5–10.1)
CC UR VC: ABNORMAL
CHLORIDE SERPL-SCNC: 113 MMOL/L (ref 97–108)
CO2 SERPL-SCNC: 17 MMOL/L (ref 21–32)
CREAT SERPL-MCNC: 0.65 MG/DL (ref 0.55–1.02)
DIFFERENTIAL METHOD BLD: NORMAL
EOSINOPHIL # BLD: 0.1 K/UL (ref 0–0.4)
EOSINOPHIL NFR BLD: 1 % (ref 0–7)
ERYTHROCYTE [DISTWIDTH] IN BLOOD BY AUTOMATED COUNT: 13 % (ref 11.5–14.5)
GLOBULIN SER CALC-MCNC: 3.4 G/DL (ref 2–4)
GLUCOSE SERPL-MCNC: 83 MG/DL (ref 65–100)
HCT VFR BLD AUTO: 41.9 % (ref 35–47)
HGB BLD-MCNC: 14.3 G/DL (ref 11.5–16)
IMM GRANULOCYTES # BLD AUTO: 0 K/UL (ref 0–0.04)
IMM GRANULOCYTES NFR BLD AUTO: 0 % (ref 0–0.5)
LYMPHOCYTES # BLD: 1.3 K/UL (ref 0.8–3.5)
LYMPHOCYTES NFR BLD: 17 % (ref 12–49)
MCH RBC QN AUTO: 27.7 PG (ref 26–34)
MCHC RBC AUTO-ENTMCNC: 34.1 G/DL (ref 30–36.5)
MCV RBC AUTO: 81 FL (ref 80–99)
MONOCYTES # BLD: 0.5 K/UL (ref 0–1)
MONOCYTES NFR BLD: 7 % (ref 5–13)
NEUTS SEG # BLD: 5.5 K/UL (ref 1.8–8)
NEUTS SEG NFR BLD: 75 % (ref 32–75)
NRBC # BLD: 0 K/UL (ref 0–0.01)
NRBC BLD-RTO: 0 PER 100 WBC
PLATELET # BLD AUTO: 253 K/UL (ref 150–400)
PMV BLD AUTO: 10.8 FL (ref 8.9–12.9)
POTASSIUM SERPL-SCNC: 3.6 MMOL/L (ref 3.5–5.1)
PROT SERPL-MCNC: 6.6 G/DL (ref 6.4–8.2)
RBC # BLD AUTO: 5.17 M/UL (ref 3.8–5.2)
SERVICE CMNT-IMP: ABNORMAL
SODIUM SERPL-SCNC: 142 MMOL/L (ref 136–145)
WBC # BLD AUTO: 7.4 K/UL (ref 3.6–11)

## 2024-03-20 PROCEDURE — 7100000011 HC PHASE II RECOVERY - ADDTL 15 MIN: Performed by: INTERNAL MEDICINE

## 2024-03-20 PROCEDURE — 3700000000 HC ANESTHESIA ATTENDED CARE: Performed by: INTERNAL MEDICINE

## 2024-03-20 PROCEDURE — 2580000003 HC RX 258: Performed by: FAMILY MEDICINE

## 2024-03-20 PROCEDURE — C1769 GUIDE WIRE: HCPCS | Performed by: INTERNAL MEDICINE

## 2024-03-20 PROCEDURE — 0FPB8DZ REMOVAL OF INTRALUMINAL DEVICE FROM HEPATOBILIARY DUCT, VIA NATURAL OR ARTIFICIAL OPENING ENDOSCOPIC: ICD-10-PCS | Performed by: INTERNAL MEDICINE

## 2024-03-20 PROCEDURE — 2709999900 HC NON-CHARGEABLE SUPPLY: Performed by: INTERNAL MEDICINE

## 2024-03-20 PROCEDURE — 6360000002 HC RX W HCPCS: Performed by: NURSE ANESTHETIST, CERTIFIED REGISTERED

## 2024-03-20 PROCEDURE — 3600007503: Performed by: INTERNAL MEDICINE

## 2024-03-20 PROCEDURE — 3600007513: Performed by: INTERNAL MEDICINE

## 2024-03-20 PROCEDURE — 6370000000 HC RX 637 (ALT 250 FOR IP): Performed by: FAMILY MEDICINE

## 2024-03-20 PROCEDURE — 36415 COLL VENOUS BLD VENIPUNCTURE: CPT

## 2024-03-20 PROCEDURE — 6360000004 HC RX CONTRAST MEDICATION: Performed by: INTERNAL MEDICINE

## 2024-03-20 PROCEDURE — 2500000003 HC RX 250 WO HCPCS: Performed by: NURSE ANESTHETIST, CERTIFIED REGISTERED

## 2024-03-20 PROCEDURE — 2580000003 HC RX 258: Performed by: NURSE ANESTHETIST, CERTIFIED REGISTERED

## 2024-03-20 PROCEDURE — 2720000010 HC SURG SUPPLY STERILE: Performed by: INTERNAL MEDICINE

## 2024-03-20 PROCEDURE — 6360000002 HC RX W HCPCS: Performed by: FAMILY MEDICINE

## 2024-03-20 PROCEDURE — 99231 SBSQ HOSP IP/OBS SF/LOW 25: CPT | Performed by: SURGERY

## 2024-03-20 PROCEDURE — 1100000000 HC RM PRIVATE

## 2024-03-20 PROCEDURE — 3700000001 HC ADD 15 MINUTES (ANESTHESIA): Performed by: INTERNAL MEDICINE

## 2024-03-20 PROCEDURE — 0FC98ZZ EXTIRPATION OF MATTER FROM COMMON BILE DUCT, VIA NATURAL OR ARTIFICIAL OPENING ENDOSCOPIC: ICD-10-PCS | Performed by: INTERNAL MEDICINE

## 2024-03-20 PROCEDURE — 85025 COMPLETE CBC W/AUTO DIFF WBC: CPT

## 2024-03-20 PROCEDURE — 7100000010 HC PHASE II RECOVERY - FIRST 15 MIN: Performed by: INTERNAL MEDICINE

## 2024-03-20 PROCEDURE — 80053 COMPREHEN METABOLIC PANEL: CPT

## 2024-03-20 RX ORDER — SODIUM CHLORIDE, SODIUM LACTATE, POTASSIUM CHLORIDE, CALCIUM CHLORIDE 600; 310; 30; 20 MG/100ML; MG/100ML; MG/100ML; MG/100ML
INJECTION, SOLUTION INTRAVENOUS CONTINUOUS
Status: DISCONTINUED | OUTPATIENT
Start: 2024-03-20 | End: 2024-03-20 | Stop reason: HOSPADM

## 2024-03-20 RX ORDER — LIDOCAINE HYDROCHLORIDE 20 MG/ML
INJECTION, SOLUTION EPIDURAL; INFILTRATION; INTRACAUDAL; PERINEURAL PRN
Status: DISCONTINUED | OUTPATIENT
Start: 2024-03-20 | End: 2024-03-20 | Stop reason: SDUPTHER

## 2024-03-20 RX ORDER — SUCCINYLCHOLINE CHLORIDE 20 MG/ML
INJECTION INTRAMUSCULAR; INTRAVENOUS PRN
Status: DISCONTINUED | OUTPATIENT
Start: 2024-03-20 | End: 2024-03-20 | Stop reason: SDUPTHER

## 2024-03-20 RX ORDER — ONDANSETRON 2 MG/ML
INJECTION INTRAMUSCULAR; INTRAVENOUS PRN
Status: DISCONTINUED | OUTPATIENT
Start: 2024-03-20 | End: 2024-03-20 | Stop reason: SDUPTHER

## 2024-03-20 RX ORDER — ROCURONIUM BROMIDE 10 MG/ML
INJECTION, SOLUTION INTRAVENOUS PRN
Status: DISCONTINUED | OUTPATIENT
Start: 2024-03-20 | End: 2024-03-20 | Stop reason: SDUPTHER

## 2024-03-20 RX ORDER — SODIUM CHLORIDE 0.9 % (FLUSH) 0.9 %
5-40 SYRINGE (ML) INJECTION PRN
Status: DISCONTINUED | OUTPATIENT
Start: 2024-03-20 | End: 2024-03-20 | Stop reason: HOSPADM

## 2024-03-20 RX ORDER — SODIUM CHLORIDE, SODIUM LACTATE, POTASSIUM CHLORIDE, CALCIUM CHLORIDE 600; 310; 30; 20 MG/100ML; MG/100ML; MG/100ML; MG/100ML
INJECTION, SOLUTION INTRAVENOUS CONTINUOUS PRN
Status: DISCONTINUED | OUTPATIENT
Start: 2024-03-20 | End: 2024-03-20 | Stop reason: SDUPTHER

## 2024-03-20 RX ORDER — 0.9 % SODIUM CHLORIDE 0.9 %
INTRAVENOUS SOLUTION INTRAVENOUS PRN
Status: DISCONTINUED | OUTPATIENT
Start: 2024-03-20 | End: 2024-03-20

## 2024-03-20 RX ORDER — SODIUM CHLORIDE 9 MG/ML
25 INJECTION, SOLUTION INTRAVENOUS PRN
Status: DISCONTINUED | OUTPATIENT
Start: 2024-03-20 | End: 2024-03-20 | Stop reason: HOSPADM

## 2024-03-20 RX ORDER — DEXAMETHASONE SODIUM PHOSPHATE 4 MG/ML
INJECTION, SOLUTION INTRA-ARTICULAR; INTRALESIONAL; INTRAMUSCULAR; INTRAVENOUS; SOFT TISSUE PRN
Status: DISCONTINUED | OUTPATIENT
Start: 2024-03-20 | End: 2024-03-20 | Stop reason: SDUPTHER

## 2024-03-20 RX ORDER — SODIUM CHLORIDE 0.9 % (FLUSH) 0.9 %
5-40 SYRINGE (ML) INJECTION EVERY 12 HOURS SCHEDULED
Status: DISCONTINUED | OUTPATIENT
Start: 2024-03-20 | End: 2024-03-20 | Stop reason: HOSPADM

## 2024-03-20 RX ADMIN — LIDOCAINE HYDROCHLORIDE 80 MG: 20 INJECTION, SOLUTION EPIDURAL; INFILTRATION; INTRACAUDAL; PERINEURAL at 11:50

## 2024-03-20 RX ADMIN — PROPOFOL 140 MG: 10 INJECTION, EMULSION INTRAVENOUS at 11:50

## 2024-03-20 RX ADMIN — ONDANSETRON HYDROCHLORIDE 4 MG: 2 INJECTION, SOLUTION INTRAMUSCULAR; INTRAVENOUS at 12:27

## 2024-03-20 RX ADMIN — PIPERACILLIN AND TAZOBACTAM 3375 MG: 3; .375 INJECTION, POWDER, FOR SOLUTION INTRAVENOUS at 10:20

## 2024-03-20 RX ADMIN — PIPERACILLIN AND TAZOBACTAM 3375 MG: 3; .375 INJECTION, POWDER, FOR SOLUTION INTRAVENOUS at 03:23

## 2024-03-20 RX ADMIN — DONEPEZIL HYDROCHLORIDE 10 MG: 10 TABLET, FILM COATED ORAL at 10:19

## 2024-03-20 RX ADMIN — ROCURONIUM BROMIDE 5 MG: 10 SOLUTION INTRAVENOUS at 11:50

## 2024-03-20 RX ADMIN — RISPERIDONE 1 MG: 1 TABLET, FILM COATED ORAL at 10:19

## 2024-03-20 RX ADMIN — AMLODIPINE BESYLATE 5 MG: 5 TABLET ORAL at 10:19

## 2024-03-20 RX ADMIN — METOPROLOL SUCCINATE 25 MG: 25 TABLET, EXTENDED RELEASE ORAL at 10:19

## 2024-03-20 RX ADMIN — SUCCINYLCHOLINE CHLORIDE 140 MG: 20 INJECTION, SOLUTION INTRAMUSCULAR; INTRAVENOUS at 11:50

## 2024-03-20 RX ADMIN — DEXAMETHASONE SODIUM PHOSPHATE 4 MG: 4 INJECTION, SOLUTION INTRAMUSCULAR; INTRAVENOUS at 12:08

## 2024-03-20 RX ADMIN — SERTRALINE HYDROCHLORIDE 25 MG: 50 TABLET ORAL at 10:19

## 2024-03-20 RX ADMIN — PHENYLEPHRINE HYDROCHLORIDE 40 MCG/MIN: 10 INJECTION INTRAVENOUS at 11:57

## 2024-03-20 RX ADMIN — SODIUM CHLORIDE, POTASSIUM CHLORIDE, SODIUM LACTATE AND CALCIUM CHLORIDE: 600; 310; 30; 20 INJECTION, SOLUTION INTRAVENOUS at 11:50

## 2024-03-20 RX ADMIN — PIPERACILLIN AND TAZOBACTAM 3375 MG: 3; .375 INJECTION, POWDER, FOR SOLUTION INTRAVENOUS at 18:27

## 2024-03-20 ASSESSMENT — PAIN - FUNCTIONAL ASSESSMENT: PAIN_FUNCTIONAL_ASSESSMENT: 0-10

## 2024-03-20 NOTE — PROGRESS NOTES
Hospitalist Progress Note  ARIADNE Vinson NP  Answering service: [phone] OR [phone] from in house phone        Date of Service:  3/20/2024  NAME:  Genesis Mello  :  1940  MRN:  [id number]      Admission Summary:          Genesis Mello is a 83 y.o. female past medical history of Alzheimer's dementia, hypothyroidism, osteoporosis, osteoarthritis, GERD, left knee DJD, lumbar DDD, bronchitis, asthma, right shoulder replacement, spinal stenosis, acute meniscal tear of knee, right breast implant presented to the emergency department via EMS from [facility] with reported nausea and vomiting, decreased oral intake.  Has no reports of abdominal pain, melena, hematochezia, hematemesis, dysuria, hematuria.  At baseline, patient is A&O x 1 limited historian with history of dementia.  On arrival to ED, initial corded vital signs temperature 98.5 °F, /74, heart rate 99, respirate 11, O2 saturation 90% room air.  12 EKG showed normal sinus rhythm, ST changes in the septal leads 80 beats a minute.  Urinalysis showed 40 ketones, > 1.030 specific gravity, moderate blood, 5-10 RBCs, 0-4 RBCs, trace protein, positive nitrite, 0-4 WBCs, negative leukocyte esterase.  Abnormal labs included blood glucose 168, platelets 93, total bili 1.2, AST 47, alkaline phosphatase 217.  Initial potassium =5.6 was hemolyzed; repeat potassium 3.8.  CT abdomen pelvis with IV contrast showed extrahepatic biliary mucosal enhancement suggesting possible cholangitis versus reactive to CBD stent with other incidental findings of small hiatal hernia, ruptured chronic right breast implant, atelectasis at lung bases, T12 compression fraction, sigmoid and transverse diverticulosis, multiple jejunal angioectasias, and right renal cyst.  ED ordered Zofran 4 mg IV x 1 dose, 0.9% normal saline 500 mL bolus x 1, Versed 0.5 mg IV x 1, and

## 2024-03-20 NOTE — PROGRESS NOTES
TRANSFER - IN REPORT:    Verbal report received from Lili Mello  being received from Ortho for ordered procedure      Report consisted of patient's Situation, Background, Assessment and   Recommendations(SBAR).     Information from the following report(s) Nurse Handoff Report was reviewed with the receiving nurse.    Opportunity for questions and clarification was provided.      Assessment completed upon patient's arrival to unit and care assumed.       Verified patient name and date of birth, scheduled procedure, and informed consent.  Assessed patient. Awake, alert, and oriented per baseline. Vital signs stable (see vital sign flowsheet). Respiratory status within defined limits, abdomen soft and non tender. Skin with in defined limits.       -  Initial RN admission and assessment performed and documented in Endoscopy navigator.     Patient evaluated by anesthesia in pre-procedure holding.     All procedural vital signs, airway assessment, and level of consciousness information monitored and recorded by anesthesia staff on the anesthesia record.     Report received from CRNA post procedure.  Patient transported to recovery area by RN.    Endoscope was pre-cleaned at bedside immediately following procedure by Lucho.      1251: TRANSFER - OUT REPORT:    Verbal report given to Lili Mello  being transferred to  for routine progression of patient care       Report consisted of patient's Situation, Background, Assessment and   Recommendations(SBAR).     Information from the following report(s) Nurse Handoff Report was reviewed with the receiving nurse.           Lines:   Peripheral IV 03/20/24 Left Forearm (Active)   Site Assessment Clean, dry & intact 03/20/24 1250   Line Status Infusing 03/20/24 1250   Phlebitis Assessment No symptoms 03/20/24 1250   Infiltration Assessment 0 03/20/24 1250   Dressing Status Clean, dry & intact 03/20/24 1250   Dressing Type Transparent 03/20/24 1250

## 2024-03-20 NOTE — ANESTHESIA PRE PROCEDURE
(SYNTHROID) tablet 175 mcg  175 mcg Oral QAM AC Seymour Medley MD   175 mcg at 03/18/24 0650       Allergies:    Allergies   Allergen Reactions   • Ace Inhibitors Hives and Swelling   • Cephalexin Other (See Comments)     Pt reports having hives from head to toe so her PCP included several drugs that she was taking at the time as the cause (Keflex, Levaquin and ACE inhibitors)  Has taken Augmentin   • Levofloxacin      Other reaction(s): Not Reported This Time   • Zolpidem Other (See Comments)     HALLUCINATIONS       Problem List:    Patient Active Problem List   Diagnosis Code   • Bronchospasm with bronchitis, acute J20.9   • Advanced care planning/counseling discussion Z71.89   • OA (osteoarthritis) M19.90   • Tachycardia R00.0   • Bronchitis J40   • HTN (hypertension) I10   • History of right shoulder replacement Z96.611   • Dementia without behavioral disturbance (HCC) F03.90   • Hypothyroid E03.9   • Osteoporosis M81.0   • Asthma J45.909   • Left knee DJD M17.12   • Advance care planning Z71.89   • DDD (degenerative disc disease), lumbar M51.36   • GERD (gastroesophageal reflux disease) K21.9   • Hypokalemia E87.6   • Biliary tract imaging abnormality R93.2       Past Medical History:        Diagnosis Date   • Acute meniscal tear of knee    • Alzheimer disease (HCC)    • Bulging disc    • Dementia (HCC)    • Hypothyroid    • Osteoporosis    • Spinal stenosis        Past Surgical History:        Procedure Laterality Date   • BREAST SURGERY      1968 implants   • COLONOSCOPY      • HYSTERECTOMY (CERVIX STATUS UNKNOWN)  1979   • IR ESOPHAGEAL DILITATION      2004   • NEUROLOGICAL SURGERY  2013   • ORTHOPEDIC SURGERY      left knee 2008   • STRESS TEST - GXT  2005    neg       Social History:    Social History     Tobacco Use   • Smoking status: Never   • Smokeless tobacco: Never   Substance Use Topics   • Alcohol use: Not Currently                                Counseling given: Not Answered      Vital

## 2024-03-20 NOTE — ANESTHESIA POSTPROCEDURE EVALUATION
Department of Anesthesiology  Postprocedure Note    Patient: Genesis Mello  MRN: [id number]  YOB: 1940  Date of evaluation: 3/20/2024    Procedure Summary       Date: 03/20/24 Room / Location: [facility] ENDO 03 / [facility] ENDOSCOPY    Anesthesia Start: 1149 Anesthesia Stop: 1243    Procedure: ENDOSCOPIC RETROGRADE CHOLANGIOPANCREATOGRAPHY (Upper GI Region) Diagnosis:       Biliary stricture      (Biliary stricture [K83.1])    Surgeons: Rafael Arias MD Responsible Provider: Hal Vann MD    Anesthesia Type: general ASA Status: 3            Anesthesia Type: No value filed.    [place] Phase I: Virginia Score: 10    [place] Phase II:      Anesthesia Post Evaluation    Patient location during evaluation: PACU  Patient participation: complete - patient participated  Level of consciousness: awake  Pain score: 0  Airway patency: patent  Nausea & Vomiting: no nausea  Cardiovascular status: blood pressure returned to baseline and hemodynamically stable  Respiratory status: acceptable  Hydration status: stable  Pain management: adequate and satisfactory to patient    No notable events documented.

## 2024-03-20 NOTE — OP NOTE
[facility]  [address] [id number]       NAME:  Genesis Mello   :   1940   MRN:   [id number]       Procedure Type:   ERCP with biliary stent removal, biliary stone removal    Indications: choledocholithiasis  Pre-operative Diagnosis: see indication above  Post-operative Diagnosis:  See findings below  : Rafael Arias MD  Staff: Circulator: Yessi Rosenberg RN  Endoscopy Technician: Lucho Harrell     Referring Provider:    MAXIMILIAN SHELL MD, Teri Ríos MD      Sedation:  General Anesthesia    Procedure Details:  After informed consent was obtained with all risks and benefits of procedure explained, the patient was taken to the fluoroscopy suite and placed in the prone position.  Upon sequential sedation as per above, the Olympus duodenoscope [id number]   was inserted via the mouthpiece and carefully advanced to the second portion of the duodenum.   The quality of visualization was good.  The duodenoscope was withdrawn into a short position.      Findings:   Esophagus: normal  Stomach: normal  Duodenum:normal, previously placed plastic biliary stent was seen transpapillary    ERCP: A  film was taken. The previously placed plastic biliary stent was removed with a rat tooth forceps. Stone fragments and sludge were expelled with stent removal. Using a [employer] RX44 Dreamtome preloaded with a 0.035 inch Dreamwire, the bile duct was cannulated with the guidewire. The Dreamtome was then advanced over the guidewire. Bile was aspirated to confirm proper positioning. Limited contrast was injected under fluoroscopic visualization. The bile duct was not dilated. A 9 mm to 12 mm biliary extraction balloon was used to sweep the bile duct multiple times. Additional sludge was expelled. An occlusion cholangiogram was negative for an upstream filling defects.    There was adequate drainage of contrast and bile. The pancreatic duct was neither entered or injected

## 2024-03-20 NOTE — PROGRESS NOTES
Patient back from ERCP.  Had stent and sludge removed.  Had conversation with patient's daughter regarding indications for laparoscopic cholecystectomy.  Also discussed what goals of treatment are for her mother and whether or not she wants her to undergo an operation.  She explained to me that she had a very difficult time after she had hand surgery due to her dementia.  I explained to the patient's daughter that surgery would be preventative and not curative of any sort of infection at this time.  After long discussion she would like to wait to see how her mother does and whether or not she becomes symptomatic from this again before proceeding with surgery.  Please call if needed

## 2024-03-21 LAB
BACTERIA SPEC CULT: NORMAL
BACTERIA SPEC CULT: NORMAL
SERVICE CMNT-IMP: NORMAL
SERVICE CMNT-IMP: NORMAL

## 2024-03-21 PROCEDURE — 6360000002 HC RX W HCPCS: Performed by: FAMILY MEDICINE

## 2024-03-21 PROCEDURE — 2580000003 HC RX 258: Performed by: NURSE PRACTITIONER

## 2024-03-21 PROCEDURE — 6360000002 HC RX W HCPCS: Performed by: NURSE PRACTITIONER

## 2024-03-21 PROCEDURE — 2580000003 HC RX 258: Performed by: FAMILY MEDICINE

## 2024-03-21 PROCEDURE — 6370000000 HC RX 637 (ALT 250 FOR IP): Performed by: FAMILY MEDICINE

## 2024-03-21 PROCEDURE — 1100000000 HC RM PRIVATE

## 2024-03-21 PROCEDURE — 2580000003 HC RX 258: Performed by: INTERNAL MEDICINE

## 2024-03-21 RX ADMIN — SODIUM CHLORIDE, PRESERVATIVE FREE 20 ML: 5 INJECTION INTRAVENOUS at 22:02

## 2024-03-21 RX ADMIN — AMLODIPINE BESYLATE 5 MG: 5 TABLET ORAL at 08:41

## 2024-03-21 RX ADMIN — METOPROLOL SUCCINATE 25 MG: 25 TABLET, EXTENDED RELEASE ORAL at 08:41

## 2024-03-21 RX ADMIN — LEVOTHYROXINE SODIUM 175 MCG: 0.15 TABLET ORAL at 07:23

## 2024-03-21 RX ADMIN — QUETIAPINE FUMARATE 25 MG: 25 TABLET ORAL at 21:59

## 2024-03-21 RX ADMIN — DONEPEZIL HYDROCHLORIDE 10 MG: 10 TABLET, FILM COATED ORAL at 08:41

## 2024-03-21 RX ADMIN — SODIUM CHLORIDE, PRESERVATIVE FREE 10 ML: 5 INJECTION INTRAVENOUS at 22:01

## 2024-03-21 RX ADMIN — SODIUM CHLORIDE, PRESERVATIVE FREE 10 ML: 5 INJECTION INTRAVENOUS at 08:42

## 2024-03-21 RX ADMIN — SERTRALINE HYDROCHLORIDE 25 MG: 50 TABLET ORAL at 08:41

## 2024-03-21 RX ADMIN — MIRTAZAPINE 15 MG: 15 TABLET, FILM COATED ORAL at 21:59

## 2024-03-21 RX ADMIN — PIPERACILLIN AND TAZOBACTAM 3375 MG: 3; .375 INJECTION, POWDER, FOR SOLUTION INTRAVENOUS at 10:16

## 2024-03-21 RX ADMIN — WATER 1000 MG: 1 INJECTION INTRAMUSCULAR; INTRAVENOUS; SUBCUTANEOUS at 18:26

## 2024-03-21 RX ADMIN — SODIUM CHLORIDE: 9 INJECTION, SOLUTION INTRAVENOUS at 15:12

## 2024-03-21 RX ADMIN — PIPERACILLIN AND TAZOBACTAM 3375 MG: 3; .375 INJECTION, POWDER, FOR SOLUTION INTRAVENOUS at 02:58

## 2024-03-21 RX ADMIN — RISPERIDONE 1 MG: 1 TABLET, FILM COATED ORAL at 08:41

## 2024-03-21 NOTE — PROGRESS NOTES
Hospitalist Progress Note  ARIADNE Pinedo NP  Answering service: [phone] OR [phone] from in house phone        Date of Service:  3/21/2024  NAME:  Genesis Mello  :  1940  MRN:  [id number]      Admission Summary:   Genesis Mello is a 83 y.o. female past medical history of Alzheimer's dementia, hypothyroidism, osteoporosis, osteoarthritis, GERD, left knee DJD, lumbar DDD, bronchitis, asthma, right shoulder replacement, spinal stenosis, acute meniscal tear of knee, right breast implant presented to the emergency department via EMS from [facility] with reported nausea and vomiting, decreased oral intake.  Has no reports of abdominal pain, melena, hematochezia, hematemesis, dysuria, hematuria.  At baseline, patient is A&O x 1 limited historian with history of dementia.  On arrival to ED, initial corded vital signs temperature 98.5 °F, /74, heart rate 99, respirate 11, O2 saturation 90% room air.  12 EKG showed normal sinus rhythm, ST changes in the septal leads 80 beats a minute.  Urinalysis showed 40 ketones, > 1.030 specific gravity, moderate blood, 5-10 RBCs, 0-4 RBCs, trace protein, positive nitrite, 0-4 WBCs, negative leukocyte esterase.  Abnormal labs included blood glucose 168, platelets 93, total bili 1.2, AST 47, alkaline phosphatase 217.  Initial potassium =5.6 was hemolyzed; repeat potassium 3.8.  CT abdomen pelvis with IV contrast showed extrahepatic biliary mucosal enhancement suggesting possible cholangitis versus reactive to CBD stent with other incidental findings of small hiatal hernia, ruptured chronic right breast implant, atelectasis at lung bases, T12 compression fraction, sigmoid and transverse diverticulosis, multiple jejunal angioectasias, and right renal cyst.  ED ordered Zofran 4 mg IV x 1 dose, 0.9% normal saline 500 mL bolus x 1, Versed 0.5 mg IV x 1, and  filed at 3/20/2024 1225  Gross per 24 hour   Intake 600 ml   Output --   Net 600 ml          Physical Examination:     I had a face to face encounter with this patient and independently examined them on 3/21/2024 as outlined below:          General :  awake, no acute distress,   HEENT: NCAT moist mucus membrane  Neck: supple, no JVD,   Chest: Clear to auscultation bilaterally   CVS: S1 S2 heard, Capillary refill less than 2 seconds  Abd: soft/ non tender, non distended, BS physiological,   Ext: no clubbing, no cyanosis, no edema, brisk 2+ DP pulses  Neuro/Psych: pleasant mood and affect, ERNA EOMI, JULIO spontaneously  Skin: warm            Data Review:    Review and/or order of clinical lab test  Review and/or order of tests in the radiology section of CPT  I personally reviewed  Image    I have independently reviewed and interpreted patient's lab and all other diagnostic data    Notes reviewed from all clinical/nonclinical/nursing services involved in patient's clinical care. Care coordination discussions were held with appropriate clinical/nonclinical/ nursing providers based on care coordination needs.     Labs:     Recent Labs     03/19/24  0435 03/20/24  0324   WBC 5.5 7.4   HGB 14.1 14.3   HCT 41.2 41.9    253       Recent Labs     03/19/24  0435 03/20/24  0324    142   K 3.2* 3.6   * 113*   CO2 20* 17*   BUN 4* 3*       Recent Labs     03/19/24  0435 03/20/24  0324   ALT 21 20   GLOB 3.8 3.4       No results for input(s): \"INR\", \"APTT\" in the last 72 hours.    Invalid input(s): \"PTP\"     No results for input(s): \"TIBC\", \"FERR\" in the last 72 hours.    Invalid input(s): \"FE\", \"PSAT\"   No results found for: \"FOL\", \"RBCF\"   No results for input(s): \"PH\", \"PCO2\", \"PO2\" in the last 72 hours.  No results for input(s): \"CPK\" in the last 72 hours.    Invalid input(s): \"CPKMB\", \"CKNDX\", \"TROIQ\"  Lab Results   Component Value Date/Time    CHOL 192 11/09/2020 09:32 AM    HDL 70 11/09/2020 09:32 AM

## 2024-03-21 NOTE — PROGRESS NOTES
[facility]   [address] [address]       GI PROGRESS NOTE  Will INNA Macedo  [phone] office      NAME: Genesis Mello   :  1940   MRN:  [id number]       Subjective:   ERCP was done yesterday.  Patient reportedly tolerated clear liquids and diet has been advanced.  Denies nausea, vomiting, or abdominal pain.    Objective:     VITALS:   Last 24hrs VS reviewed since prior progress note. Most recent are:  Vitals:    24 0805   BP: (!) 130/93   Pulse: 76   Resp: 14   Temp: 98.1 °F (36.7 °C)   SpO2: 94%     PHYSICAL EXAM:  General: Cooperative, no acute distress  Neurologic:  Alert, drowsy  HEENT: EOMI, no scleral icterus   Lungs:  No acute respiratory distress  Heart:  S1 S2  Abdomen: Soft, non-distended, no tenderness, no guarding, no rebound. +Bowel sounds.   Extremities: Warm  Psych:   Not anxious or agitated    Lab Data Reviewed:     No results found for this or any previous visit (from the past 24 hour(s)).    Assessment:     Nausea/vomiting and decreased appetite: Per chart review, CT abdomen/pelvis with IV contrast (3/16/24): extrahepatic biliary mucosal enhancement: cholangitis versus reactive to CBD stent, correlation with LFTs recommended. ERCP 22 for choledocholithiasis at [facility]: small stones were removed and 7 Fr by 7 cm plastic biliary stent was placed. ERCP (3/20/24) by Dr. Arias: removal of previously placed plastic biliary stent; residual choledocholithiasis - removed.   Dementia  Hypothyroidism  Hypertension     Patient Active Problem List   Diagnosis    Bronchospasm with bronchitis, acute    Advanced care planning/counseling discussion    OA (osteoarthritis)    Tachycardia    Bronchitis    HTN (hypertension)    History of right shoulder replacement    Dementia without behavioral disturbance (HCC)    Hypothyroid    Osteoporosis    Asthma    Left knee DJD    Advance care planning    DDD (degenerative disc disease), lumbar    GERD  (gastroesophageal reflux disease)    Hypokalemia    Biliary tract imaging abnormality     Plan:     Diet as tolerated  Supportive measures  Trend LFTs  ERCP as above  Surgery following  We will sign off and be available again as needed. Please call us with any questions. Thank you.      Signed By: LUCINDA Chan     3/21/2024  12:04 PM

## 2024-03-22 VITALS
TEMPERATURE: 97.7 F | OXYGEN SATURATION: 94 % | DIASTOLIC BLOOD PRESSURE: 79 MMHG | RESPIRATION RATE: 17 BRPM | SYSTOLIC BLOOD PRESSURE: 149 MMHG | HEIGHT: 63 IN | WEIGHT: 147.27 LBS | BODY MASS INDEX: 26.09 KG/M2 | HEART RATE: 73 BPM

## 2024-03-22 PROCEDURE — 2580000003 HC RX 258: Performed by: INTERNAL MEDICINE

## 2024-03-22 PROCEDURE — 97161 PT EVAL LOW COMPLEX 20 MIN: CPT

## 2024-03-22 PROCEDURE — 97530 THERAPEUTIC ACTIVITIES: CPT

## 2024-03-22 PROCEDURE — 6370000000 HC RX 637 (ALT 250 FOR IP): Performed by: FAMILY MEDICINE

## 2024-03-22 PROCEDURE — 2580000003 HC RX 258: Performed by: FAMILY MEDICINE

## 2024-03-22 RX ORDER — CEFDINIR 300 MG/1
300 CAPSULE ORAL 2 TIMES DAILY
Qty: 10 CAPSULE | Refills: 0 | Status: SHIPPED | OUTPATIENT
Start: 2024-03-22 | End: 2024-03-27

## 2024-03-22 RX ADMIN — DONEPEZIL HYDROCHLORIDE 10 MG: 10 TABLET, FILM COATED ORAL at 08:28

## 2024-03-22 RX ADMIN — SODIUM CHLORIDE: 9 INJECTION, SOLUTION INTRAVENOUS at 03:55

## 2024-03-22 RX ADMIN — METOPROLOL SUCCINATE 25 MG: 25 TABLET, EXTENDED RELEASE ORAL at 08:29

## 2024-03-22 RX ADMIN — AMLODIPINE BESYLATE 5 MG: 5 TABLET ORAL at 08:29

## 2024-03-22 RX ADMIN — SERTRALINE HYDROCHLORIDE 25 MG: 50 TABLET ORAL at 08:29

## 2024-03-22 RX ADMIN — RISPERIDONE 1 MG: 1 TABLET, FILM COATED ORAL at 08:28

## 2024-03-22 RX ADMIN — LEVOTHYROXINE SODIUM 175 MCG: 0.15 TABLET ORAL at 06:16

## 2024-03-22 RX ADMIN — SODIUM CHLORIDE, PRESERVATIVE FREE 10 ML: 5 INJECTION INTRAVENOUS at 08:28

## 2024-03-22 NOTE — DISCHARGE SUMMARY
Discharge Summary       PATIENT ID: Genesis Mello  MRN: [id number]   YOB: 1940    DATE OF ADMISSION: 3/16/2024 11:31 AM    DATE OF DISCHARGE: 3/22/2024  PRIMARY CARE PROVIDER: Teri Ríos MD     ATTENDING PHYSICIAN: Dr. Lewis   DISCHARGING PROVIDER: ARIADNE Allan NP    To contact this individual call [phone] and ask the  to page.  If unavailable ask to be transferred the Adult Hospitalist Department.    CONSULTATIONS: IP CONSULT TO GI  IP CONSULT TO GI  IP CONSULT TO GENERAL SURGERY  IP CONSULT HOME HEALTH    PROCEDURES/SURGERIES: Procedure(s):  ENDOSCOPIC RETROGRADE CHOLANGIOPANCREATOGRAPHY     ADMITTING DIAGNOSES & HOSPITAL COURSE:     Genesis Mello is a 83 y.o. female past medical history of Alzheimer's dementia, hypothyroidism, osteoporosis, osteoarthritis, GERD, left knee DJD, lumbar DDD, bronchitis, asthma, right shoulder replacement, spinal stenosis, acute meniscal tear of knee, right breast implant presented to the emergency department via EMS from [facility] with reported nausea and vomiting, decreased oral intake.  Has no reports of abdominal pain, melena, hematochezia, hematemesis, dysuria, hematuria.  At baseline, patient is A&O x 1 limited historian with history of dementia.  On arrival to ED, initial corded vital signs temperature 98.5 °F, /74, heart rate 99, respirate 11, O2 saturation 90% room air.  12 EKG showed normal sinus rhythm, ST changes in the septal leads 80 beats a minute.  Urinalysis showed 40 ketones, > 1.030 specific gravity, moderate blood, 5-10 RBCs, 0-4 RBCs, trace protein, positive nitrite, 0-4 WBCs, negative leukocyte esterase.  Abnormal labs included blood glucose 168, platelets 93, total bili 1.2, AST 47, alkaline phosphatase 217.  Initial potassium =5.6 was hemolyzed; repeat potassium 3.8.  CT abdomen pelvis with IV contrast showed extrahepatic biliary mucosal enhancement suggesting possible cholangitis versus

## 2024-03-22 NOTE — DISCHARGE INSTRUCTIONS
Discharge Summary       PATIENT ID: Genesis Mello  MRN: [id number]   YOB: 1940    DATE OF ADMISSION: 3/16/2024 11:31 AM    DATE OF DISCHARGE: 3/22/2024  PRIMARY CARE PROVIDER: Teri Ríos MD     ATTENDING PHYSICIAN: Dr. Lewis   DISCHARGING PROVIDER: ARIADNE Allan NP    To contact this individual call [phone] and ask the  to page.  If unavailable ask to be transferred the Adult Hospitalist Department.    CONSULTATIONS: IP CONSULT TO GI  IP CONSULT TO GI  IP CONSULT TO GENERAL SURGERY  IP CONSULT HOME HEALTH    PROCEDURES/SURGERIES: Procedure(s):  ENDOSCOPIC RETROGRADE CHOLANGIOPANCREATOGRAPHY     ADMITTING DIAGNOSES & HOSPITAL COURSE:     Genesis Mello is a 83 y.o. female past medical history of Alzheimer's dementia, hypothyroidism, osteoporosis, osteoarthritis, GERD, left knee DJD, lumbar DDD, bronchitis, asthma, right shoulder replacement, spinal stenosis, acute meniscal tear of knee, right breast implant presented to the emergency department via EMS from [facility] with reported nausea and vomiting, decreased oral intake.  Has no reports of abdominal pain, melena, hematochezia, hematemesis, dysuria, hematuria.  At baseline, patient is A&O x 1 limited historian with history of dementia.  On arrival to ED, initial corded vital signs temperature 98.5 °F, /74, heart rate 99, respirate 11, O2 saturation 90% room air.  12 EKG showed normal sinus rhythm, ST changes in the septal leads 80 beats a minute.  Urinalysis showed 40 ketones, > 1.030 specific gravity, moderate blood, 5-10 RBCs, 0-4 RBCs, trace protein, positive nitrite, 0-4 WBCs, negative leukocyte esterase.  Abnormal labs included blood glucose 168, platelets 93, total bili 1.2, AST 47, alkaline phosphatase 217.  Initial potassium =5.6 was hemolyzed; repeat potassium 3.8.  CT abdomen pelvis with IV contrast showed extrahepatic biliary mucosal enhancement suggesting possible cholangitis versus

## 2024-04-02 NOTE — PLAN OF CARE
Problem: Discharge Planning  Goal: Discharge to home or other facility with appropriate resources  Outcome: Progressing     
  Problem: Physical Therapy - Adult  Goal: By Discharge: Performs mobility at highest level of function for planned discharge setting.  See evaluation for individualized goals.  Description: FUNCTIONAL STATUS PRIOR TO ADMISSION: Patient was able to stand and pivot to the chair prior to admission, per chart and CM notes.    HOME SUPPORT PRIOR TO ADMISSION: The patient lived in an assisted living memory care facility.    Physical Therapy Goals  Initiated 3/22/2024  1.  Patient will move from supine to sit and sit to supine and roll side to side in bed with supervision/set-up within 7 day(s).    2.  Patient will perform sit to stand with supervision/set-up within 7 day(s).  3.  Patient will transfer from bed to chair and chair to bed with supervision/set-up using the least restrictive device within 7 day(s).  3/22/2024 1115 by Tasia Banks, PT  Outcome: Progressing     
  Problem: Skin/Tissue Integrity  Goal: Absence of new skin breakdown  Description: 1.  Monitor for areas of redness and/or skin breakdown  2.  Assess vascular access sites hourly  3.  Every 4-6 hours minimum:  Change oxygen saturation probe site  4.  Every 4-6 hours:  If on nasal continuous positive airway pressure, respiratory therapy assess nares and determine need for appliance change or resting period.  3/17/2024 2352 by Gill Cleveland, RN  Outcome: Progressing  3/17/2024 1447 by Xiomara Vanessa RN  Outcome: Progressing     Problem: Safety - Adult  Goal: Free from fall injury  3/17/2024 2352 by Gill Cleveland, RN  Outcome: Progressing  3/17/2024 1447 by Xiomara Vanessa, RN  Outcome: Progressing     Problem: ABCDS Injury Assessment  Goal: Absence of physical injury  3/17/2024 2352 by Gill Cleveland, RN  Outcome: Progressing  3/17/2024 1447 by Xiomara Vanessa, RN  Outcome: Progressing     
  Problem: Skin/Tissue Integrity  Goal: Absence of new skin breakdown  Description: 1.  Monitor for areas of redness and/or skin breakdown  2.  Assess vascular access sites hourly  3.  Every 4-6 hours minimum:  Change oxygen saturation probe site  4.  Every 4-6 hours:  If on nasal continuous positive airway pressure, respiratory therapy assess nares and determine need for appliance change or resting period.  3/18/2024 1011 by Shell Gamino, RN  Outcome: Progressing  3/17/2024 2352 by Gill Cleveland, RN  Outcome: Progressing     Problem: Safety - Adult  Goal: Free from fall injury  3/17/2024 2352 by Gill Cleveland, RN  Outcome: Progressing     Problem: ABCDS Injury Assessment  Goal: Absence of physical injury  3/17/2024 2352 by Gill Cleveland, RN  Outcome: Progressing     
  Problem: Skin/Tissue Integrity  Goal: Absence of new skin breakdown  Description: 1.  Monitor for areas of redness and/or skin breakdown  2.  Assess vascular access sites hourly  3.  Every 4-6 hours minimum:  Change oxygen saturation probe site  4.  Every 4-6 hours:  If on nasal continuous positive airway pressure, respiratory therapy assess nares and determine need for appliance change or resting period.  Outcome: Progressing     Problem: Safety - Adult  Goal: Free from fall injury  Outcome: Progressing     
[Time Spent: ___ minutes] : I have spent [unfilled] minutes of time on the encounter.
Care Hospitalization in Select Patient Groups: A Retrospective, Observational Study. Arch Rehabil Res Clin Transl. 2022 Jul 16;4(3):177194. doi: [email or web address]/[email or web address].2022.401757. PMID: 56301205; PMCID: [id number].  4. Benita JOSEPH, Misty S, Dorita W, Charis CHAN. AM-PAC Short Forms Manual 4.0. Revised 2/2020.                                                                                                                                                                                                                               Pain Rating:  Pain in bilateral knees, L more than R, discussed with RN  Pain Intervention(s):   nursing notified and addressing, rest, and repositioning    Activity Tolerance:   Fair     After treatment:   Patient left in no apparent distress in bed, Call bell within reach, Bed/ chair alarm activated, Side rails x3, and Heels elevated for pressure relief    COMMUNICATION/EDUCATION:   The patient's plan of care was discussed with: occupational therapist, registered nurse, and          Thank you for this referral.  Tasia Banks, PT  Minutes: 23
